# Patient Record
Sex: MALE | Race: OTHER
[De-identification: names, ages, dates, MRNs, and addresses within clinical notes are randomized per-mention and may not be internally consistent; named-entity substitution may affect disease eponyms.]

---

## 2017-12-27 ENCOUNTER — APPOINTMENT (OUTPATIENT)
Dept: NEUROSURGERY | Facility: CLINIC | Age: 67
End: 2017-12-27
Payer: MEDICARE

## 2017-12-27 PROBLEM — Z00.00 ENCOUNTER FOR PREVENTIVE HEALTH EXAMINATION: Status: ACTIVE | Noted: 2017-12-27

## 2017-12-27 PROCEDURE — 99203 OFFICE O/P NEW LOW 30 MIN: CPT

## 2018-11-25 ENCOUNTER — RX RENEWAL (OUTPATIENT)
Age: 68
End: 2018-11-25

## 2019-07-30 ENCOUNTER — OUTPATIENT (OUTPATIENT)
Dept: OUTPATIENT SERVICES | Facility: HOSPITAL | Age: 69
LOS: 1 days | Discharge: HOME | End: 2019-07-30
Payer: MEDICARE

## 2019-07-30 DIAGNOSIS — R10.2 PELVIC AND PERINEAL PAIN: ICD-10-CM

## 2019-07-30 DIAGNOSIS — M54.5 LOW BACK PAIN: ICD-10-CM

## 2019-07-30 DIAGNOSIS — M25.552 PAIN IN LEFT HIP: ICD-10-CM

## 2019-07-30 PROCEDURE — 73502 X-RAY EXAM HIP UNI 2-3 VIEWS: CPT | Mod: 26,LT

## 2019-07-30 PROCEDURE — 72110 X-RAY EXAM L-2 SPINE 4/>VWS: CPT | Mod: 26

## 2022-06-25 ENCOUNTER — INPATIENT (INPATIENT)
Facility: HOSPITAL | Age: 72
LOS: 5 days | Discharge: HOME | End: 2022-07-01
Attending: HOSPITALIST | Admitting: HOSPITALIST
Payer: MEDICARE

## 2022-06-25 VITALS
DIASTOLIC BLOOD PRESSURE: 65 MMHG | SYSTOLIC BLOOD PRESSURE: 146 MMHG | OXYGEN SATURATION: 100 % | RESPIRATION RATE: 18 BRPM | TEMPERATURE: 98 F | WEIGHT: 199.96 LBS | HEART RATE: 86 BPM

## 2022-06-25 DIAGNOSIS — E87.2 ACIDOSIS: ICD-10-CM

## 2022-06-25 PROCEDURE — 99285 EMERGENCY DEPT VISIT HI MDM: CPT

## 2022-06-25 PROCEDURE — 71045 X-RAY EXAM CHEST 1 VIEW: CPT | Mod: 26

## 2022-06-25 NOTE — ED ADULT TRIAGE NOTE - CHIEF COMPLAINT QUOTE
Pt c/o of cough x 1 month and abdominal pain with diarrhea x 2 weeks. Pt also has bilateral lower extremity swelling. denies fevers

## 2022-06-26 LAB
ALBUMIN SERPL ELPH-MCNC: 2.2 G/DL — LOW (ref 3.5–5.2)
ALBUMIN SERPL ELPH-MCNC: 2.4 G/DL — LOW (ref 3.5–5.2)
ALP SERPL-CCNC: 75 U/L — SIGNIFICANT CHANGE UP (ref 30–115)
ALP SERPL-CCNC: 84 U/L — SIGNIFICANT CHANGE UP (ref 30–115)
ALT FLD-CCNC: 12 U/L — SIGNIFICANT CHANGE UP (ref 0–41)
ALT FLD-CCNC: 14 U/L — SIGNIFICANT CHANGE UP (ref 0–41)
ANION GAP SERPL CALC-SCNC: 12 MMOL/L — SIGNIFICANT CHANGE UP (ref 7–14)
ANION GAP SERPL CALC-SCNC: 7 MMOL/L — SIGNIFICANT CHANGE UP (ref 7–14)
APPEARANCE UR: CLEAR — SIGNIFICANT CHANGE UP
AST SERPL-CCNC: 41 U/L — SIGNIFICANT CHANGE UP (ref 0–41)
AST SERPL-CCNC: 51 U/L — HIGH (ref 0–41)
BASE EXCESS BLDV CALC-SCNC: -3.7 MMOL/L — LOW (ref -2–3)
BASOPHILS # BLD AUTO: 0.05 K/UL — SIGNIFICANT CHANGE UP (ref 0–0.2)
BASOPHILS # BLD AUTO: 0.05 K/UL — SIGNIFICANT CHANGE UP (ref 0–0.2)
BASOPHILS NFR BLD AUTO: 0.8 % — SIGNIFICANT CHANGE UP (ref 0–1)
BASOPHILS NFR BLD AUTO: 0.9 % — SIGNIFICANT CHANGE UP (ref 0–1)
BILIRUB SERPL-MCNC: <0.2 MG/DL — SIGNIFICANT CHANGE UP (ref 0.2–1.2)
BILIRUB SERPL-MCNC: <0.2 MG/DL — SIGNIFICANT CHANGE UP (ref 0.2–1.2)
BILIRUB UR-MCNC: NEGATIVE — SIGNIFICANT CHANGE UP
BUN SERPL-MCNC: 19 MG/DL — SIGNIFICANT CHANGE UP (ref 10–20)
BUN SERPL-MCNC: 22 MG/DL — HIGH (ref 10–20)
CA-I SERPL-SCNC: 1.2 MMOL/L — SIGNIFICANT CHANGE UP (ref 1.15–1.33)
CALCIUM SERPL-MCNC: 7.9 MG/DL — LOW (ref 8.5–10.1)
CALCIUM SERPL-MCNC: 8.1 MG/DL — LOW (ref 8.5–10.1)
CHLORIDE SERPL-SCNC: 102 MMOL/L — SIGNIFICANT CHANGE UP (ref 98–110)
CHLORIDE SERPL-SCNC: 103 MMOL/L — SIGNIFICANT CHANGE UP (ref 98–110)
CO2 SERPL-SCNC: 19 MMOL/L — SIGNIFICANT CHANGE UP (ref 17–32)
CO2 SERPL-SCNC: 25 MMOL/L — SIGNIFICANT CHANGE UP (ref 17–32)
COLOR SPEC: SIGNIFICANT CHANGE UP
CREAT SERPL-MCNC: 1 MG/DL — SIGNIFICANT CHANGE UP (ref 0.7–1.5)
CREAT SERPL-MCNC: 1.1 MG/DL — SIGNIFICANT CHANGE UP (ref 0.7–1.5)
DIFF PNL FLD: NEGATIVE — SIGNIFICANT CHANGE UP
EGFR: 71 ML/MIN/1.73M2 — SIGNIFICANT CHANGE UP
EGFR: 80 ML/MIN/1.73M2 — SIGNIFICANT CHANGE UP
EOSINOPHIL # BLD AUTO: 0.07 K/UL — SIGNIFICANT CHANGE UP (ref 0–0.7)
EOSINOPHIL # BLD AUTO: 0.09 K/UL — SIGNIFICANT CHANGE UP (ref 0–0.7)
EOSINOPHIL NFR BLD AUTO: 1.2 % — SIGNIFICANT CHANGE UP (ref 0–8)
EOSINOPHIL NFR BLD AUTO: 1.4 % — SIGNIFICANT CHANGE UP (ref 0–8)
GAS PNL BLDV: 131 MMOL/L — LOW (ref 136–145)
GAS PNL BLDV: SIGNIFICANT CHANGE UP
GAS PNL BLDV: SIGNIFICANT CHANGE UP
GLUCOSE BLDC GLUCOMTR-MCNC: 113 MG/DL — HIGH (ref 70–99)
GLUCOSE BLDC GLUCOMTR-MCNC: 119 MG/DL — HIGH (ref 70–99)
GLUCOSE BLDC GLUCOMTR-MCNC: 92 MG/DL — SIGNIFICANT CHANGE UP (ref 70–99)
GLUCOSE SERPL-MCNC: 119 MG/DL — HIGH (ref 70–99)
GLUCOSE SERPL-MCNC: 170 MG/DL — HIGH (ref 70–99)
GLUCOSE UR QL: ABNORMAL
HCO3 BLDV-SCNC: 23 MMOL/L — SIGNIFICANT CHANGE UP (ref 22–29)
HCT VFR BLD CALC: 32.9 % — LOW (ref 42–52)
HCT VFR BLD CALC: 36.5 % — LOW (ref 42–52)
HCT VFR BLDA CALC: 37 % — LOW (ref 39–51)
HGB BLD CALC-MCNC: 12.2 G/DL — LOW (ref 12.6–17.4)
HGB BLD-MCNC: 10.9 G/DL — LOW (ref 14–18)
HGB BLD-MCNC: 11.9 G/DL — LOW (ref 14–18)
IMM GRANULOCYTES NFR BLD AUTO: 1.1 % — HIGH (ref 0.1–0.3)
IMM GRANULOCYTES NFR BLD AUTO: 1.2 % — HIGH (ref 0.1–0.3)
KETONES UR-MCNC: NEGATIVE — SIGNIFICANT CHANGE UP
LACTATE BLDV-MCNC: 2.1 MMOL/L — HIGH (ref 0.5–2)
LACTATE SERPL-SCNC: 1.4 MMOL/L — SIGNIFICANT CHANGE UP (ref 0.7–2)
LACTATE SERPL-SCNC: 2.2 MMOL/L — HIGH (ref 0.7–2)
LEUKOCYTE ESTERASE UR-ACNC: NEGATIVE — SIGNIFICANT CHANGE UP
LYMPHOCYTES # BLD AUTO: 2.27 K/UL — SIGNIFICANT CHANGE UP (ref 1.2–3.4)
LYMPHOCYTES # BLD AUTO: 2.97 K/UL — SIGNIFICANT CHANGE UP (ref 1.2–3.4)
LYMPHOCYTES # BLD AUTO: 39.3 % — SIGNIFICANT CHANGE UP (ref 20.5–51.1)
LYMPHOCYTES # BLD AUTO: 44.6 % — SIGNIFICANT CHANGE UP (ref 20.5–51.1)
MAGNESIUM SERPL-MCNC: 1.5 MG/DL — LOW (ref 1.8–2.4)
MCHC RBC-ENTMCNC: 26 PG — LOW (ref 27–31)
MCHC RBC-ENTMCNC: 26.5 PG — LOW (ref 27–31)
MCHC RBC-ENTMCNC: 32.6 G/DL — SIGNIFICANT CHANGE UP (ref 32–37)
MCHC RBC-ENTMCNC: 33.1 G/DL — SIGNIFICANT CHANGE UP (ref 32–37)
MCV RBC AUTO: 79.7 FL — LOW (ref 80–94)
MCV RBC AUTO: 79.9 FL — LOW (ref 80–94)
MONOCYTES # BLD AUTO: 0.54 K/UL — SIGNIFICANT CHANGE UP (ref 0.1–0.6)
MONOCYTES # BLD AUTO: 0.64 K/UL — HIGH (ref 0.1–0.6)
MONOCYTES NFR BLD AUTO: 9.4 % — HIGH (ref 1.7–9.3)
MONOCYTES NFR BLD AUTO: 9.6 % — HIGH (ref 1.7–9.3)
NEUTROPHILS # BLD AUTO: 2.77 K/UL — SIGNIFICANT CHANGE UP (ref 1.4–6.5)
NEUTROPHILS # BLD AUTO: 2.84 K/UL — SIGNIFICANT CHANGE UP (ref 1.4–6.5)
NEUTROPHILS NFR BLD AUTO: 42.5 % — SIGNIFICANT CHANGE UP (ref 42.2–75.2)
NEUTROPHILS NFR BLD AUTO: 48 % — SIGNIFICANT CHANGE UP (ref 42.2–75.2)
NITRITE UR-MCNC: NEGATIVE — SIGNIFICANT CHANGE UP
NRBC # BLD: 0 /100 WBCS — SIGNIFICANT CHANGE UP (ref 0–0)
NRBC # BLD: 0 /100 WBCS — SIGNIFICANT CHANGE UP (ref 0–0)
NT-PROBNP SERPL-SCNC: 271 PG/ML — SIGNIFICANT CHANGE UP (ref 0–300)
PCO2 BLDV: 48 MMHG — SIGNIFICANT CHANGE UP (ref 42–55)
PH BLDV: 7.29 — LOW (ref 7.32–7.43)
PH UR: 5.5 — SIGNIFICANT CHANGE UP (ref 5–8)
PLATELET # BLD AUTO: 334 K/UL — SIGNIFICANT CHANGE UP (ref 130–400)
PLATELET # BLD AUTO: 349 K/UL — SIGNIFICANT CHANGE UP (ref 130–400)
PO2 BLDV: 38 MMHG — SIGNIFICANT CHANGE UP
POTASSIUM BLDV-SCNC: 3.9 MMOL/L — SIGNIFICANT CHANGE UP (ref 3.5–5.1)
POTASSIUM SERPL-MCNC: 3.8 MMOL/L — SIGNIFICANT CHANGE UP (ref 3.5–5)
POTASSIUM SERPL-MCNC: 4.3 MMOL/L — SIGNIFICANT CHANGE UP (ref 3.5–5)
POTASSIUM SERPL-SCNC: 3.8 MMOL/L — SIGNIFICANT CHANGE UP (ref 3.5–5)
POTASSIUM SERPL-SCNC: 4.3 MMOL/L — SIGNIFICANT CHANGE UP (ref 3.5–5)
PROT SERPL-MCNC: 4.3 G/DL — LOW (ref 6–8)
PROT SERPL-MCNC: 4.7 G/DL — LOW (ref 6–8)
PROT UR-MCNC: NEGATIVE — SIGNIFICANT CHANGE UP
RAPID RVP RESULT: DETECTED
RBC # BLD: 4.12 M/UL — LOW (ref 4.7–6.1)
RBC # BLD: 4.58 M/UL — LOW (ref 4.7–6.1)
RBC # FLD: 16.4 % — HIGH (ref 11.5–14.5)
RBC # FLD: 16.5 % — HIGH (ref 11.5–14.5)
SAO2 % BLDV: 55.2 % — SIGNIFICANT CHANGE UP
SARS-COV-2 RNA SPEC QL NAA+PROBE: DETECTED
SODIUM SERPL-SCNC: 134 MMOL/L — LOW (ref 135–146)
SODIUM SERPL-SCNC: 134 MMOL/L — LOW (ref 135–146)
SP GR SPEC: 1.01 — SIGNIFICANT CHANGE UP (ref 1.01–1.03)
TROPONIN T SERPL-MCNC: 0.04 NG/ML — CRITICAL HIGH
TROPONIN T SERPL-MCNC: 0.04 NG/ML — CRITICAL HIGH
UROBILINOGEN FLD QL: SIGNIFICANT CHANGE UP
WBC # BLD: 5.77 K/UL — SIGNIFICANT CHANGE UP (ref 4.8–10.8)
WBC # BLD: 6.66 K/UL — SIGNIFICANT CHANGE UP (ref 4.8–10.8)
WBC # FLD AUTO: 5.77 K/UL — SIGNIFICANT CHANGE UP (ref 4.8–10.8)
WBC # FLD AUTO: 6.66 K/UL — SIGNIFICANT CHANGE UP (ref 4.8–10.8)

## 2022-06-26 PROCEDURE — 71275 CT ANGIOGRAPHY CHEST: CPT | Mod: 26,MA

## 2022-06-26 PROCEDURE — 93010 ELECTROCARDIOGRAM REPORT: CPT

## 2022-06-26 PROCEDURE — 99223 1ST HOSP IP/OBS HIGH 75: CPT

## 2022-06-26 PROCEDURE — 74177 CT ABD & PELVIS W/CONTRAST: CPT | Mod: 26,MA

## 2022-06-26 PROCEDURE — 93970 EXTREMITY STUDY: CPT | Mod: 26

## 2022-06-26 RX ORDER — PANTOPRAZOLE SODIUM 20 MG/1
40 TABLET, DELAYED RELEASE ORAL
Refills: 0 | Status: DISCONTINUED | OUTPATIENT
Start: 2022-06-26 | End: 2022-07-01

## 2022-06-26 RX ORDER — CEFTRIAXONE 500 MG/1
1000 INJECTION, POWDER, FOR SOLUTION INTRAMUSCULAR; INTRAVENOUS EVERY 24 HOURS
Refills: 0 | Status: DISCONTINUED | OUTPATIENT
Start: 2022-06-26 | End: 2022-06-30

## 2022-06-26 RX ORDER — GUAIFENESIN/DEXTROMETHORPHAN 600MG-30MG
10 TABLET, EXTENDED RELEASE 12 HR ORAL EVERY 6 HOURS
Refills: 0 | Status: DISCONTINUED | OUTPATIENT
Start: 2022-06-26 | End: 2022-07-01

## 2022-06-26 RX ORDER — OXYCODONE AND ACETAMINOPHEN 5; 325 MG/1; MG/1
1 TABLET ORAL ONCE
Refills: 0 | Status: DISCONTINUED | OUTPATIENT
Start: 2022-06-26 | End: 2022-06-26

## 2022-06-26 RX ORDER — INSULIN LISPRO 100/ML
VIAL (ML) SUBCUTANEOUS
Refills: 0 | Status: DISCONTINUED | OUTPATIENT
Start: 2022-06-26 | End: 2022-07-01

## 2022-06-26 RX ORDER — SODIUM CHLORIDE 9 MG/ML
500 INJECTION, SOLUTION INTRAVENOUS
Refills: 0 | Status: DISCONTINUED | OUTPATIENT
Start: 2022-06-26 | End: 2022-06-27

## 2022-06-26 RX ORDER — METRONIDAZOLE 500 MG
500 TABLET ORAL EVERY 8 HOURS
Refills: 0 | Status: DISCONTINUED | OUTPATIENT
Start: 2022-06-26 | End: 2022-06-30

## 2022-06-26 RX ORDER — INSULIN GLARGINE 100 [IU]/ML
8 INJECTION, SOLUTION SUBCUTANEOUS AT BEDTIME
Refills: 0 | Status: DISCONTINUED | OUTPATIENT
Start: 2022-06-26 | End: 2022-07-01

## 2022-06-26 RX ORDER — MORPHINE SULFATE 50 MG/1
2 CAPSULE, EXTENDED RELEASE ORAL EVERY 6 HOURS
Refills: 0 | Status: DISCONTINUED | OUTPATIENT
Start: 2022-06-26 | End: 2022-07-01

## 2022-06-26 RX ORDER — IPRATROPIUM/ALBUTEROL SULFATE 18-103MCG
3 AEROSOL WITH ADAPTER (GRAM) INHALATION EVERY 6 HOURS
Refills: 0 | Status: DISCONTINUED | OUTPATIENT
Start: 2022-06-26 | End: 2022-07-01

## 2022-06-26 RX ORDER — OXYCODONE AND ACETAMINOPHEN 5; 325 MG/1; MG/1
1 TABLET ORAL EVERY 6 HOURS
Refills: 0 | Status: DISCONTINUED | OUTPATIENT
Start: 2022-06-26 | End: 2022-07-01

## 2022-06-26 RX ADMIN — Medication 10 MILLILITER(S): at 10:03

## 2022-06-26 RX ADMIN — Medication 500 MILLIGRAM(S): at 23:16

## 2022-06-26 RX ADMIN — PANTOPRAZOLE SODIUM 40 MILLIGRAM(S): 20 TABLET, DELAYED RELEASE ORAL at 14:18

## 2022-06-26 RX ADMIN — CEFTRIAXONE 100 MILLIGRAM(S): 500 INJECTION, POWDER, FOR SOLUTION INTRAMUSCULAR; INTRAVENOUS at 12:57

## 2022-06-26 RX ADMIN — Medication 10 MILLILITER(S): at 18:07

## 2022-06-26 RX ADMIN — Medication 100 MILLIGRAM(S): at 18:06

## 2022-06-26 RX ADMIN — Medication 10 MILLILITER(S): at 23:17

## 2022-06-26 RX ADMIN — SODIUM CHLORIDE 70 MILLILITER(S): 9 INJECTION, SOLUTION INTRAVENOUS at 14:21

## 2022-06-26 RX ADMIN — INSULIN GLARGINE 8 UNIT(S): 100 INJECTION, SOLUTION SUBCUTANEOUS at 22:16

## 2022-06-26 RX ADMIN — Medication 500 MILLIGRAM(S): at 14:18

## 2022-06-26 RX ADMIN — Medication 100 MILLIGRAM(S): at 10:03

## 2022-06-26 NOTE — ED PROVIDER NOTE - CARE PLAN
1 Principal Discharge DX:	Acute COVID-19  Secondary Diagnosis:	Elevated troponin  Secondary Diagnosis:	Mesenteric lymphadenitis

## 2022-06-26 NOTE — ED PROVIDER NOTE - ATTENDING CONTRIBUTION TO CARE
71 y.o. male, PMH of HLD and DM, recently returned from 5 mo trip to [facility], comes in with multiple complains.  Pt reports non-bloody diarrhea, generalized abdominal discomfort which started 2 wks ago and is getting worse. No n/v. (+) decreased appetite. (+) wt loss.  Pt is also c/o 2 mo h/o cough which is getting worse. No CP/SOB. No fever/chills.  Pt is also c/o b/l LE edema, L>R, which started 2-3 days ago after the flight.     Denies fevers, chills, nausea, vomiting, urinary symptoms, chest pain, palpitations, shortness of breath, dyspnea on exertion, syncope/near syncope, headache, numbness, focal deficits, visual changes, gait or balance changes, dizziness.    On exam, pt in NAD, AAOx3, head NC/AT, CN II-XII intact, lungs rhonchi B/L, CV S1S2 regular, abdomen soft/generalized discomfort/ND/(+)BS, ext (+) pitting edema, motor 5/5x4, sensation intact.    Labs/XR/CT done and reviewed. Pt with iliatis- TB/fungal dz can't be excluded. Pt is also (+) for covid pneumonia. Abx given. WIll admit pt for further work up.

## 2022-06-26 NOTE — ED PROVIDER NOTE - CLINICAL SUMMARY MEDICAL DECISION MAKING FREE TEXT BOX
71 y.o. male, PMH of HLD and DM, recently returned from 5 mo trip to [facility], comes in with multiple complains.  Pt reports non-bloody diarrhea, generalized abdominal discomfort which started 2 wks ago and is getting worse. No n/v. (+) decreased appetite. (+) wt loss.  Pt is also c/o 2 mo h/o cough which is getting worse. No CP/SOB. No fever/chills.  Pt is also c/o b/l LE edema, L>R, which started 2-3 days ago after the flight.     Denies fevers, chills, nausea, vomiting, urinary symptoms, chest pain, palpitations, shortness of breath, dyspnea on exertion, syncope/near syncope, headache, numbness, focal deficits, visual changes, gait or balance changes, dizziness.    On exam, pt in NAD, AAOx3, head NC/AT, CN II-XII intact, lungs rhonchi B/L, CV S1S2 regular, abdomen soft/generalized discomfort/ND/(+)BS, ext (+) pitting edema, motor 5/5x4, sensation intact.    Labs/XR/CT done and reviewed. Pt with iliatis- TB/fungal dz can't be excluded. Pt is also (+) for covid pneumonia. Abx given. WIll admit pt for further work up.

## 2022-06-26 NOTE — H&P ADULT - ASSESSMENT
71-year-old male with a history of HLD and DM presenting for cough for the last 2 months.    #Cough     #LE edema     #HDL     #DM       DVT ppx: Lovenox  GI ppx: pantoprazole   Diet: DASH/TLC carb consistent   Ambulate: as tolerated   Med/Rec: IMCOMPLETE. Patient and wife do not have a list of medications Attempted to call pharmacy but was closed !!!!!!!!!!!  Disposition: Acute from home  71-year-old male with a history of HLD and DM presenting for cough for the last 2 months.    #Cough   #Bibasilar granular GG opacities   #Paratracheal lymph node measuring 1.2 cm  Working DDx: TB   - CT chest: No PE, Bibasilar granular GG opacities   - Non smoker, no sick contact   - SPO 99%    - Follow up IGRA, sputum culture and blood cultutres       #LE edema     #Troponemia     #HDL     #DM       DVT ppx: Lovenox  GI ppx: pantoprazole   Diet: DASH/TLC carb consistent   Ambulate: as tolerated   Med/Rec: IMCOMPLETE. Patient and wife do not have a list of medications Attempted to call pharmacy but was closed !!!!!!!!!!!  Disposition: Acute from home  71-year-old male with a history of HLD and DM presenting for cough for the last 2 months.    #Cough   #Bibasilar granular GG opacities   #Paratracheal lymph node measuring 1.2 cm  Working DDx: TB   - CT chest: No PE, Bibasilar granular GG opacities   - Non smoker, no sick contact. No pets at home   - Recent travel to [place]   - SPO 99% on RA   - No evidence of pneumonia  - Follow up on IGRA, sputum cultures, AFB   - PRN Robitussin for cough     #Ileitis  #Extensive mesenteric and retroperitoneal JOSE  Working DDX: possible TB advance s infectious ilitis   - Pending stool sample and stool culture  - Start Ceftriaxone and flagyl for Ileitis    - Consult infectious diseases     #COVID   - Incidental finding covid positive PCR  - CT chest: GG opacities on the lower lobes   - No O2 requirements   - Will follow inflammatory markers (ferritin, CRP, ESR)   - Continue flagyl and ceftriaxone     #LE edema   - No Hx of HF as per patient  - Duple LE: neg   - Recent 16hr flight   - Elevate legs >>> monitor   - Follow up BNP and TTE     #Lactic Acid   - Lactic acid 2.2 >>> follow up repeat   - start fluids 70cc/hr   - Patient clinically hypovolemic (dry mucous membranes despite LE present_)     #Elevated troponins   - Troponin 0.02 >> repeat at 11 am and 8 pm   - No chest pain   - EKG unremarkable     #HDL   - Follow up lipid profile   - unknown home medication >> call pharmacy     #DM   - not on home insulin         DVT ppx: Lovenox  GI ppx: pantoprazole   Diet: DASH/TLC carb consistent   Ambulate: as tolerated   Med/Rec: IMCOMPLETE. Patient and wife do not have a list of medications Attempted to call pharmacy but was closed !!!!!!!!!!!  Disposition: Acute from home  71-year-old male with a history of HLD and DM presenting for cough for the last 2 months.    #Cough   #Bibasilar granular GG opacities   #Paratracheal lymph node measuring 1.2 cm  Working DDx: TB   - CT chest: No PE, Bibasilar granular GG opacities   - Non smoker, no sick contact. No pets at home   - Recent travel to [place]   - SPO 99% on RA   - No evidence of pneumonia  - Follow up on IGRA, sputum cultures, AFB   - PRN Robitussin for cough     #Ileitis  #Extensive mesenteric and retroperitoneal JOSE  Working DDX: possible TB advance s infectious ilitis   - Pending stool sample and stool culture  - Start Ceftriaxone and flagyl for Ileitis    - Consult infectious diseases     #COVID   - Incidental finding covid positive PCR  - CT chest: GG opacities on the lower lobes   - No O2 requirements   - Will follow inflammatory markers (ferritin, CRP, ESR)   - Continue flagyl and ceftriaxone     #LE edema   - No Hx of HF as per patient  - Duple LE: neg   - Recent 16hr flight   - Elevate legs >>> monitor   - Follow up BNP and TTE     #Lactic Acid   - Lactic acid 2.2 >>> follow up repeat   - start fluids 70cc/hr   - Patient clinically hypovolemic (dry mucous membranes despite LE present_)     #Elevated troponins   - Troponin 0.02 >> repeat at 11 am and 8 pm   - No chest pain   - EKG unremarkable     #HDL   - Follow up lipid profile   - unknown home medication >> call pharmacy     #DM   - not on home insulin   - Will start lantus 8 units   - Correctional scale    DVT ppx: Lovenox  GI ppx: pantoprazole   Diet: DASH/TLC carb consistent   Ambulate: as tolerated   Med/Rec: IMCOMPLETE. Patient and wife do not have a list of medications Attempted to call pharmacy but was closed !!!!!!!!!!!  Disposition: Acute from home

## 2022-06-26 NOTE — ED ADULT NURSE NOTE - OBJECTIVE STATEMENT
Patient presents to ED in NAD a+ox3 co cough X 1 month and diarrhea X 2 weeks.  Pt denies fever, n/v.

## 2022-06-26 NOTE — H&P ADULT - NSHPREVIEWOFSYSTEMS_GEN_ALL_CORE
General: Denies fatigue, fever, chills, weight loss; + weight gain as above  HENT: Denies oral sores, neck masses, nasal d/c, hearing problems  Vison: Denies change in vision, eye pain, redness, discharge  Cardiac: As above  Pulmonary: As above  GI: Denies heart burn, swallowing difficulty, abdominal pain, diarrhea, constipation  : As per HPI  Neuro: Denies seizure, weakness, numbness  Endo: Denies heat/cold intolerance, weight changes, polyuria, polydipsia  Heme/Onc: Denies unusual bleeding, bruising, clotting  MSK: Denies join pain, swelling, muscle aches  Mental Health: Denies anxiety, depression, mood changes,  Skin/Hair: Denies rashes, non-healing wounds, ulcers, hair loss General: Fatigue and weight loss as noted above   HENT: Denies oral sores, neck masses, nasal d/c, hearing problems  Vison: Denies change in vision, eye pain, redness, discharge  Cardiac: no orthopnea, no chest pain   Pulmonary: As above  GI: epigastric pain; denies any swallowing difficulty, abdominal pain, diarrhea, constipation  : no dysuria, no urinary difficulty   Neuro: Denies seizure, weakness, numbness  Endo: Denies heat/cold intolerance, weight changes, polyuria, polydipsia  Heme/Onc: Denies unusual bleeding, bruising, clotting  MSK: Denies join pain, swelling, muscle aches  Mental Health: Denies anxiety, depression, mood changes,  Skin/Hair: Denies rashes, non-healing wounds, ulcers, hair loss

## 2022-06-26 NOTE — ED PROVIDER NOTE - ADMIT DISPOSITION PRESENT ON ADMISSION SEPSIS
Asthma    DM (diabetes mellitus), type 2    HTN (hypertension), benign    RA (rheumatoid arthritis)
No

## 2022-06-26 NOTE — ED ADULT NURSE NOTE - INTERVENTIONS DEFINITIONS
[place] to call system/Call bell, personal items and telephone within reach/Instruct patient to call for assistance/Room bathroom lighting operational/Non-slip footwear when patient is off stretcher/Physically safe environment: no spills, clutter or unnecessary equipment/Stretcher in lowest position, wheels locked, appropriate side rails in place/Monitor gait and stability/Monitor for mental status changes and reorient to person, place, and time/Review medications for side effects contributing to fall risk/Reinforce activity limits and safety measures with patient and family/Provide visual clues: red socks

## 2022-06-26 NOTE — PATIENT PROFILE ADULT - FALL HARM RISK - HARM RISK INTERVENTIONS

## 2022-06-26 NOTE — H&P ADULT - NSHPLABSRESULTS_GEN_ALL_CORE
11.9   6.66  )-----------( 349      ( 26 Jun 2022 01:00 )             36.5   06-26    134<L>  |  103  |  22<H>  ----------------------------<  119<H>  4.3   |  19  |  1.1    Ca    8.1<L>      26 Jun 2022 01:00    TPro  4.7<L>  /  Alb  2.4<L>  /  TBili  <0.2  /  DBili  x   /  AST  51<H>  /  ALT  14  /  AlkPhos  84  06-26    Lactate, Blood: 2.2: Elevated lactate. Consider ordering follow-up lactate to trend. mmol/L (06.26.22 @ 01:00)      < from: [facility] Duplex Lower Ext Vein Scan, Bilat (06.26.22 @ 09:50) >    IMPRESSION:  No evidence of deep venous thrombosis in either lower extremity.  Difficult study, patient with Covid. Constant coughing.    --- End of Report ---    < end of copied text >    CT abdomen and pelvis  Long segment ileitis with extensive mesenteric and retroperitoneal   lymphadenopathy (some of which are necrotic) with stranding of mesentery.   Mesenteric lymphadenitis secondary to enteritis, lymphadenitis from   inflammatory etiologies, and lymphoma are considerations, and further   evaluation recommended.    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    --- End of Report ---    < from: CT Angio Chest PE Protocol w/ IV Cont (06.26.22 @ 02:44) >      IMPRESSION:    No evidence of acute pulmonary embolus.    Multiple bilateral pulmonary nodules measuring up to 3 mm in the left   upper lobe. Per Fleischner Society 2017 guidelines, consider optional CT   chest in 12 months in a high risk patient (emphysema).    Bibasilar nodular patchy groundglass opacities, may be   infectious/inflammatory in etiology.    Enlarged left paratracheal lymph node measuring 1.2 cm, likely reactive.      < end of copied text >

## 2022-06-26 NOTE — H&P ADULT - HISTORY OF PRESENT ILLNESS
71-year-old male with a history of hyperlipidemia and diabetes.  Presenting for multiple complaints.  Patient recently traveled to [facility] for 5-month, returning 3 days ago.  This was a 16-hour flight.  He has been having a cough for 1 month which is what and associated with pleuritic chest pain.  He then developed vague diffuse abdominal pain associated with nonbloody diarrhea for 2 weeks.  Over the past 2 to 3 days he has been having bilateral symmetric lower extremity pitting edema, after the flight. No fever/chills, sore throat, palpitations, NV, dysuria, hematuria, new joint pain, FND, rash, trauma. 71-year-old male with a history of HLD and DM presenting for cough for the last 2 months. Patient describes a nonproductive cough that started 2 months ago and that has gradually worsened over time; there is no associated chest pain or SOB. Associated symptoms incloude weight loss (unknow    resenting for multiple complaints.  Patient recently traveled to [facility] for 5-month, returning 3 days ago.  This was a 16-hour flight.  He has been having a cough for 1 month which is what and associated with pleuritic chest pain.  He then developed vague diffuse abdominal pain associated with nonbloody diarrhea for 2 weeks.  Over the past 2 to 3 days he has been having bilateral symmetric lower extremity pitting edema, after the flight. No fever/chills, sore throat, palpitations, NV, dysuria, hematuria, new joint pain, FND, rash, trauma. 71-year-old male with a history of HLD and DM presenting for cough for the last 2 months. Patient describes a nonproductive cough that started 2 months ago and that has gradually worsened over time; there is no associated chest pain or SOB. Associated symptoms started 2 months ago include weight loss (unknown how much), decreased appetite, fatigue, abdominal pain and diarrhea. Patient endorses feeling sick for a while and not eating appropriately Diarrhea episodes were postprandial associated with epigastric pain; alternating diarrhea with lose stools have were dark red in color and now greenish (as per patient) with no mucus.  Patient recently traveled to [facility] for 4 months total, returning 3 days ago to [place].   Over the past 2 to 3 days he has been having bilateral symmetric lower extremity pitting edema worse on the left leg than on the right, after the flight. No fever/chills, sore throat, palpitations, NV, dysuria, hematuria, new joint pain, FND, rash, trauma; denies any sick contact or exposure to animals (no cattle or home pets neither here or in [facility]     ED vitals stable  >>> CT chest abdomen pelvis done in ED     Vital Signs Last 24 Hrs  T(C): 36.4 (26 Jun 2022 07:51), Max: 36.8 (25 Jun 2022 22:09)  T(F): 97.6 (26 Jun 2022 07:51), Max: 98.3 (25 Jun 2022 22:09)  HR: 84 (26 Jun 2022 07:51) (84 - 87)  BP: 140/64 (26 Jun 2022 07:51) (140/64 - 146/65)  BP(mean): --  RR: 18 (26 Jun 2022 07:51) (18 - 18)  SpO2: 99% (26 Jun 2022 07:51) (98% - 100%)

## 2022-06-26 NOTE — ED PROVIDER NOTE - PROGRESS NOTE DETAILS
Resident AO: CXR suspicious for ?L-sided pneumothorax, though there are lung markings beyond the line.

## 2022-06-26 NOTE — ED PROVIDER NOTE - PHYSICAL EXAMINATION
_  Vital signs reviewed; ABCs intact  GENERAL: Well nourished, comfortable  SKIN: Warm, dry  HEAD & NECK: NCAT, supple neck  EYES: EOMI, PER B/L, no scleral icterus, no conjunctival injection, no conjunctival pallor  ENT: MMM  CARD: RRR, S1, S2; no murmurs, no rubs, no gallops  RESP: Normal respiratory effort, +rhonchi bilaterally  ABD: Soft, ND, +tender throughout; no rebound, no guarding, +BS; no CVAT  EXT: +B/L pitting edema; pulses palpable distally; no calf tenderness; symmetrical calf circumferences  NEUROMSK: Grossly intact  PSYCH: AAOx3, cooperative, appropriate

## 2022-06-26 NOTE — ED PROVIDER NOTE - OBJECTIVE STATEMENT
Patient is a 71-year-old male with a history of hyperlipidemia and diabetes.  Presenting for multiple complaints.  Patient recently traveled to [facility] for 5-month, returning 3 days ago.  This was a 16-hour flight.  He has been having a cough for 1 month which is what and associated with pleuritic chest pain.  He then developed vague diffuse abdominal pain associated with nonbloody diarrhea for 2 weeks.  Over the past 2 to 3 days he has been having bilateral symmetric lower extremity pitting edema, after the flight. No fever/chills, sore throat, palpitations, NV, dysuria, hematuria, new joint pain, FND, rash, trauma.

## 2022-06-26 NOTE — H&P ADULT - NSHPPHYSICALEXAM_GEN_ALL_CORE
T(C): 36.4 (06-26-22 @ 07:51), Max: 36.8 (06-25-22 @ 22:09)  HR: 84 (06-26-22 @ 07:51) (84 - 87)  BP: 140/64 (06-26-22 @ 07:51) (140/64 - 146/65)  RR: 18 (06-26-22 @ 07:51) (18 - 18)  SpO2: 99% (06-26-22 @ 07:51) (98% - 100%)    CONSTITUTIONAL: Well groomed, patient looks fatigued and is coughing   EYES: PERRLA and symmetric, EOMI, No conjunctival or scleral injection, non-icteric  ENMT: Oral mucosa with dry membranes. No external nasal lesions; nasal mucosa not inflamed; normal dentition; no pharyngeal injection or exudates  NECK: Supple, symmetric and without tracheal deviation  RESPIRATORY: No respiratory distress, no use of accessory muscles; Inspiratory wheezes with ronchi  CARDIOVASCULAR: RRRR, +S1S2, no murmurs, no rubs, no gallops; no JVD, 2+ pitting edema of lower extremitites   GASTROINTESTINAL: Soft, non tender, non distended, no rebound, no guarding  SKIN: No rashes or ulcers noted; no subcutaneous nodules or induration palpable  NEUROLOGIC: CN II-XII intact; moves all extremities  PSYCHIATRIC: Appropriate insight/judgment; A+O x 3, mood and affect appropriate

## 2022-06-26 NOTE — ED ADULT NURSE REASSESSMENT NOTE - NS ED NURSE REASSESS COMMENT FT1
Patient transferred to ED 3 room 16 in [facility]. Report endorsed to receiving RN

## 2022-06-27 LAB
A1C WITH ESTIMATED AVERAGE GLUCOSE RESULT: 5.6 % — SIGNIFICANT CHANGE UP (ref 4–5.6)
ALBUMIN SERPL ELPH-MCNC: 2.4 G/DL — LOW (ref 3.5–5.2)
ALP SERPL-CCNC: 84 U/L — SIGNIFICANT CHANGE UP (ref 30–115)
ALT FLD-CCNC: 12 U/L — SIGNIFICANT CHANGE UP (ref 0–41)
ANION GAP SERPL CALC-SCNC: 12 MMOL/L — SIGNIFICANT CHANGE UP (ref 7–14)
AST SERPL-CCNC: 46 U/L — HIGH (ref 0–41)
BASOPHILS # BLD AUTO: 0.07 K/UL — SIGNIFICANT CHANGE UP (ref 0–0.2)
BASOPHILS NFR BLD AUTO: 1.5 % — HIGH (ref 0–1)
BILIRUB SERPL-MCNC: 0.2 MG/DL — SIGNIFICANT CHANGE UP (ref 0.2–1.2)
BUN SERPL-MCNC: 15 MG/DL — SIGNIFICANT CHANGE UP (ref 10–20)
CALCIUM SERPL-MCNC: 8 MG/DL — LOW (ref 8.5–10.1)
CHLORIDE SERPL-SCNC: 108 MMOL/L — SIGNIFICANT CHANGE UP (ref 98–110)
CHOLEST SERPL-MCNC: 61 MG/DL — SIGNIFICANT CHANGE UP
CO2 SERPL-SCNC: 23 MMOL/L — SIGNIFICANT CHANGE UP (ref 17–32)
CREAT SERPL-MCNC: 0.9 MG/DL — SIGNIFICANT CHANGE UP (ref 0.7–1.5)
CRP SERPL-MCNC: 5.6 MG/L — HIGH
CULTURE RESULTS: SIGNIFICANT CHANGE UP
EGFR: 91 ML/MIN/1.73M2 — SIGNIFICANT CHANGE UP
EOSINOPHIL # BLD AUTO: 0.12 K/UL — SIGNIFICANT CHANGE UP (ref 0–0.7)
EOSINOPHIL NFR BLD AUTO: 2.5 % — SIGNIFICANT CHANGE UP (ref 0–8)
ERYTHROCYTE [SEDIMENTATION RATE] IN BLOOD: 5 MM/HR — SIGNIFICANT CHANGE UP (ref 0–10)
ESTIMATED AVERAGE GLUCOSE: 114 MG/DL — SIGNIFICANT CHANGE UP (ref 68–114)
FERRITIN SERPL-MCNC: 47 NG/ML — SIGNIFICANT CHANGE UP (ref 30–400)
GLUCOSE BLDC GLUCOMTR-MCNC: 104 MG/DL — HIGH (ref 70–99)
GLUCOSE BLDC GLUCOMTR-MCNC: 166 MG/DL — HIGH (ref 70–99)
GLUCOSE BLDC GLUCOMTR-MCNC: 83 MG/DL — SIGNIFICANT CHANGE UP (ref 70–99)
GLUCOSE SERPL-MCNC: 83 MG/DL — SIGNIFICANT CHANGE UP (ref 70–99)
HCT VFR BLD CALC: 35 % — LOW (ref 42–52)
HCV AB S/CO SERPL IA: 0.06 COI — SIGNIFICANT CHANGE UP
HCV AB SERPL-IMP: SIGNIFICANT CHANGE UP
HDLC SERPL-MCNC: 30 MG/DL — LOW
HGB BLD-MCNC: 11.5 G/DL — LOW (ref 14–18)
HIV 1+2 AB+HIV1 P24 AG SERPL QL IA: SIGNIFICANT CHANGE UP
IMM GRANULOCYTES NFR BLD AUTO: 1.3 % — HIGH (ref 0.1–0.3)
LACTATE SERPL-SCNC: 0.8 MMOL/L — SIGNIFICANT CHANGE UP (ref 0.7–2)
LIPID PNL WITH DIRECT LDL SERPL: 17 MG/DL — SIGNIFICANT CHANGE UP
LYMPHOCYTES # BLD AUTO: 1.77 K/UL — SIGNIFICANT CHANGE UP (ref 1.2–3.4)
LYMPHOCYTES # BLD AUTO: 37.1 % — SIGNIFICANT CHANGE UP (ref 20.5–51.1)
MAGNESIUM SERPL-MCNC: 1.5 MG/DL — LOW (ref 1.8–2.4)
MCHC RBC-ENTMCNC: 26.4 PG — LOW (ref 27–31)
MCHC RBC-ENTMCNC: 32.9 G/DL — SIGNIFICANT CHANGE UP (ref 32–37)
MCV RBC AUTO: 80.3 FL — SIGNIFICANT CHANGE UP (ref 80–94)
MONOCYTES # BLD AUTO: 0.37 K/UL — SIGNIFICANT CHANGE UP (ref 0.1–0.6)
MONOCYTES NFR BLD AUTO: 7.8 % — SIGNIFICANT CHANGE UP (ref 1.7–9.3)
NEUTROPHILS # BLD AUTO: 2.38 K/UL — SIGNIFICANT CHANGE UP (ref 1.4–6.5)
NEUTROPHILS NFR BLD AUTO: 49.8 % — SIGNIFICANT CHANGE UP (ref 42.2–75.2)
NON HDL CHOLESTEROL: 31 MG/DL — SIGNIFICANT CHANGE UP
NRBC # BLD: 0 /100 WBCS — SIGNIFICANT CHANGE UP (ref 0–0)
NT-PROBNP SERPL-SCNC: 487 PG/ML — HIGH (ref 0–300)
PLATELET # BLD AUTO: 350 K/UL — SIGNIFICANT CHANGE UP (ref 130–400)
POTASSIUM SERPL-MCNC: 3.4 MMOL/L — LOW (ref 3.5–5)
POTASSIUM SERPL-SCNC: 3.4 MMOL/L — LOW (ref 3.5–5)
PROCALCITONIN SERPL-MCNC: 0.1 NG/ML — SIGNIFICANT CHANGE UP (ref 0.02–0.1)
PROT SERPL-MCNC: 4.4 G/DL — LOW (ref 6–8)
RBC # BLD: 4.36 M/UL — LOW (ref 4.7–6.1)
RBC # FLD: 16.5 % — HIGH (ref 11.5–14.5)
SODIUM SERPL-SCNC: 143 MMOL/L — SIGNIFICANT CHANGE UP (ref 135–146)
SPECIMEN SOURCE: SIGNIFICANT CHANGE UP
TRIGL SERPL-MCNC: 74 MG/DL — SIGNIFICANT CHANGE UP
TROPONIN T SERPL-MCNC: 0.03 NG/ML — CRITICAL HIGH
TROPONIN T SERPL-MCNC: 0.04 NG/ML — CRITICAL HIGH
WBC # BLD: 4.77 K/UL — LOW (ref 4.8–10.8)
WBC # FLD AUTO: 4.77 K/UL — LOW (ref 4.8–10.8)

## 2022-06-27 PROCEDURE — 76700 US EXAM ABDOM COMPLETE: CPT | Mod: 26

## 2022-06-27 PROCEDURE — 99233 SBSQ HOSP IP/OBS HIGH 50: CPT

## 2022-06-27 PROCEDURE — 99222 1ST HOSP IP/OBS MODERATE 55: CPT

## 2022-06-27 RX ORDER — TUBERCULIN PURIFIED PROTEIN DERIVATIVE 5 [IU]/.1ML
5 INJECTION, SOLUTION INTRADERMAL ONCE
Refills: 0 | Status: DISCONTINUED | OUTPATIENT
Start: 2022-06-27 | End: 2022-07-01

## 2022-06-27 RX ORDER — BEBTELOVIMAB 87.5 MG/ML
175 INJECTION, SOLUTION INTRAVENOUS ONCE
Refills: 0 | Status: COMPLETED | OUTPATIENT
Start: 2022-06-27 | End: 2022-06-27

## 2022-06-27 RX ADMIN — Medication 500 MILLIGRAM(S): at 05:11

## 2022-06-27 RX ADMIN — Medication 100 MILLIGRAM(S): at 17:40

## 2022-06-27 RX ADMIN — PANTOPRAZOLE SODIUM 40 MILLIGRAM(S): 20 TABLET, DELAYED RELEASE ORAL at 05:11

## 2022-06-27 RX ADMIN — BEBTELOVIMAB 175 MILLIGRAM(S): 87.5 INJECTION, SOLUTION INTRAVENOUS at 17:45

## 2022-06-27 RX ADMIN — Medication 10 MILLILITER(S): at 17:41

## 2022-06-27 RX ADMIN — Medication 100 MILLIGRAM(S): at 11:39

## 2022-06-27 RX ADMIN — Medication 2: at 11:39

## 2022-06-27 RX ADMIN — CEFTRIAXONE 100 MILLIGRAM(S): 500 INJECTION, POWDER, FOR SOLUTION INTRAMUSCULAR; INTRAVENOUS at 11:46

## 2022-06-27 RX ADMIN — Medication 10 MILLILITER(S): at 05:11

## 2022-06-27 RX ADMIN — INSULIN GLARGINE 8 UNIT(S): 100 INJECTION, SOLUTION SUBCUTANEOUS at 22:35

## 2022-06-27 RX ADMIN — Medication 100 MILLIGRAM(S): at 02:30

## 2022-06-27 RX ADMIN — Medication 500 MILLIGRAM(S): at 22:36

## 2022-06-27 RX ADMIN — Medication 500 MILLIGRAM(S): at 15:00

## 2022-06-27 RX ADMIN — Medication 10 MILLILITER(S): at 11:39

## 2022-06-27 NOTE — PROGRESS NOTE ADULT - SUBJECTIVE AND OBJECTIVE BOX
ROSS DIETRICH 71y Male  MRN#: [id number]   CODE STATUS:________    Hospital Day: 1d    Pt is currently admitted with the primary diagnosis of weight loss and diarrhea.       SUBJECTIVE  71 yr old male with pmh DM & HLD was presented with 2 month hx of diarrhea, weight loss and cough.   ROS+ Dyspnea on exertion, cough, lower extremity swelling, diarrhea         Present Today:     OBJECTIVE  PAST MEDICAL & SURGICAL HISTORY  Diabetes mellitus    Hyperlipidemia    No significant past surgical history                                                ALLERGIES:  No Known Allergies                           HOME MEDICATIONS  Home Medications:  albuterol 200 mcg inhalation capsule:  (2022 11:05)  aspirin 81 mg oral tablet: 1 tab(s) orally once a day (2022 11:06)                           MEDICATIONS:  STANDING MEDICATIONS  bebtelovimab (EUA) Injectable 175 milliGRAM(s) IV Push once  benzonatate 100 milliGRAM(s) Oral every 8 hours  cefTRIAXone   IVPB 1000 milliGRAM(s) IV Intermittent every 24 hours  guaifenesin/dextromethorphan Oral Liquid 10 milliLiter(s) Oral every 6 hours  insulin glargine Injectable (LANTUS) 8 Unit(s) SubCutaneous at bedtime  insulin lispro (ADMELOG) corrective regimen sliding scale   SubCutaneous three times a day before meals  metroNIDAZOLE    Tablet 500 milliGRAM(s) Oral every 8 hours  pantoprazole    Tablet 40 milliGRAM(s) Oral before breakfast  PPD  5 Tuberculin Unit(s) Injectable 5 Unit(s) IntraDermal once    PRN MEDICATIONS  albuterol/ipratropium for Nebulization 3 milliLiter(s) Nebulizer every 6 hours PRN  morphine  - Injectable 2 milliGRAM(s) IV Push every 6 hours PRN  oxycodone    5 mG/acetaminophen 325 mG 1 Tablet(s) Oral every 6 hours PRN                                            ------------------------------------------------------------  VITAL SIGNS: Last 24 Hours  T(C): 36.2 (2022 16:10), Max: 36.2 (2022 16:10)  T(F): 97.2 (2022 16:10), Max: 97.2 (2022 16:10)  HR: 77 (2022 16:10) (77 - 81)  BP: 171/72 (2022 16:10) (146/67 - 171/72)  BP(mean): --  RR: 18 (2022 16:10) (18 - 18)  SpO2: --      22 @ 07:01  -  22 @ 17:28  --------------------------------------------------------  IN: 480 mL / OUT: 0 mL / NET: 480 mL                                               LABS:                        11.5   4.77  )-----------( 350      ( 2022 05:36 )             35.0         143  |  108  |  15  ----------------------------<  83  3.4<L>   |  23  |  0.9    Ca    8.0<L>      2022 05:36  Mg     1.5         TPro  4.4<L>  /  Alb  2.4<L>  /  TBili  0.2  /  DBili  x   /  AST  46<H>  /  ALT  12  /  AlkPhos  84        Urinalysis Basic - ( 2022 01:00 )    Color: Light Yellow / Appearance: Clear / S.015 / pH: x  Gluc: x / Ketone: Negative  / Bili: Negative / Urobili: <2 mg/dL   Blood: x / Protein: Negative / Nitrite: Negative   Leuk Esterase: Negative / RBC: x / WBC x   Sq Epi: x / Non Sq Epi: x / Bacteria: x        Troponin T, Serum: 0.03 ng/mL *HH* (22 @ 12:27)  Troponin T, Serum: 0.04 ng/mL *HH* (22 @ 05:36)  Sedimentation Rate, Erythrocyte: 5 mm/Hr (22 @ 05:36)  Lactate, Blood: 0.8 mmol/L (22 @ 05:36)          CARDIAC MARKERS ( 2022 12:27 )  x     / 0.03 ng/mL / x     / x     / x      CARDIAC MARKERS ( 2022 05:36 )  x     / 0.04 ng/mL / x     / x     / x      CARDIAC MARKERS ( 2022 11:47 )  x     / 0.04 ng/mL / x     / x     / x      CARDIAC MARKERS ( 2022 01:00 )  x     / 0.04 ng/mL / x     / x     / x                                                  RADIOLOGY:        PHYSICAL EXAM:  GENERAL: NAD, lying in bed comfortably  HEAD:  Atraumatic, Normocephalic  EYES: EOMI, PERRLA, conjunctiva and sclera clear  ENT: Moist mucous membranes  NECK: Supple, No JVD  CHEST/LUNG: Clear to auscultation bilaterally; No rales, rhonchi, wheezing, or rubs. Unlabored respirations  HEART: Regular rate and rhythm; No murmurs, rubs, or gallops  ABDOMEN: BSx4; Soft, nontender, nondistended  EXTREMITIES:  2+ Peripheral Pulses, brisk capillary refill. No clubbing, cyanosis, or edema  NERVOUS SYSTEM:  A&Ox3, no focal deficits   SKIN: No rashes or lesions                                         ASSESSMENT & PLAN      71 yr old male with pmh DM & HLD was presented with 2 month hx of diarrhea, weight loss and cough.   ROS+ Dyspnea on exertion, cough, lower extremity swelling, diarrhea     1. Necrotic Mesenteric Lymphadenitis   -patient not septic now nor on admission   -CT remarkable for long segment ileitis with mesenteric and retroperitoneal necrotic lymphadenopathy   retroperitoneal lymph nodes fully compressing IVC  -GI on board and recommended labs ordered. Patient to remain on clear liq diet.       2. COVID 19 infection   patient respirating ok on ra.   VACCINATED X3    3. DM2   A1C 5.6   FS MONITORING   B/B INSULIN                                                                                                   ----------------------------------------------------  # DVT prophylaxis     # GI prophylaxis     # Diet     # Activity Score (AM-PAC)    # Code status     # Disposition                                                                              --------------------------------------------------------    # Handoff      ROSS DIETRICH 71y Male  MRN#: [id number]   CODE STATUS:________    Hospital Day: 1d    Pt is currently admitted with the primary diagnosis of weight loss and diarrhea.       SUBJECTIVE  71 yr old male with pmh DM & HLD was presented with 2 month hx of diarrhea, weight loss and cough.   ROS+ Dyspnea on exertion, cough, lower extremity swelling, diarrhea         Present Today:     OBJECTIVE  PAST MEDICAL & SURGICAL HISTORY  Diabetes mellitus    Hyperlipidemia    No significant past surgical history                                                ALLERGIES:  No Known Allergies                           HOME MEDICATIONS  Home Medications:  albuterol 200 mcg inhalation capsule:  (2022 11:05)  aspirin 81 mg oral tablet: 1 tab(s) orally once a day (2022 11:06)                           MEDICATIONS:  STANDING MEDICATIONS  bebtelovimab (EUA) Injectable 175 milliGRAM(s) IV Push once  benzonatate 100 milliGRAM(s) Oral every 8 hours  cefTRIAXone   IVPB 1000 milliGRAM(s) IV Intermittent every 24 hours  guaifenesin/dextromethorphan Oral Liquid 10 milliLiter(s) Oral every 6 hours  insulin glargine Injectable (LANTUS) 8 Unit(s) SubCutaneous at bedtime  insulin lispro (ADMELOG) corrective regimen sliding scale   SubCutaneous three times a day before meals  metroNIDAZOLE    Tablet 500 milliGRAM(s) Oral every 8 hours  pantoprazole    Tablet 40 milliGRAM(s) Oral before breakfast  PPD  5 Tuberculin Unit(s) Injectable 5 Unit(s) IntraDermal once    PRN MEDICATIONS  albuterol/ipratropium for Nebulization 3 milliLiter(s) Nebulizer every 6 hours PRN  morphine  - Injectable 2 milliGRAM(s) IV Push every 6 hours PRN  oxycodone    5 mG/acetaminophen 325 mG 1 Tablet(s) Oral every 6 hours PRN                                            ------------------------------------------------------------  VITAL SIGNS: Last 24 Hours  T(C): 36.2 (2022 16:10), Max: 36.2 (2022 16:10)  T(F): 97.2 (2022 16:10), Max: 97.2 (2022 16:10)  HR: 77 (2022 16:10) (77 - 81)  BP: 171/72 (2022 16:10) (146/67 - 171/72)  BP(mean): --  RR: 18 (2022 16:10) (18 - 18)  SpO2: --      22 @ 07:01  -  22 @ 17:28  --------------------------------------------------------  IN: 480 mL / OUT: 0 mL / NET: 480 mL                                               LABS:                        11.5   4.77  )-----------( 350      ( 2022 05:36 )             35.0         143  |  108  |  15  ----------------------------<  83  3.4<L>   |  23  |  0.9    Ca    8.0<L>      2022 05:36  Mg     1.5         TPro  4.4<L>  /  Alb  2.4<L>  /  TBili  0.2  /  DBili  x   /  AST  46<H>  /  ALT  12  /  AlkPhos  84        Urinalysis Basic - ( 2022 01:00 )    Color: Light Yellow / Appearance: Clear / S.015 / pH: x  Gluc: x / Ketone: Negative  / Bili: Negative / Urobili: <2 mg/dL   Blood: x / Protein: Negative / Nitrite: Negative   Leuk Esterase: Negative / RBC: x / WBC x   Sq Epi: x / Non Sq Epi: x / Bacteria: x        Troponin T, Serum: 0.03 ng/mL *HH* (22 @ 12:27)  Troponin T, Serum: 0.04 ng/mL *HH* (22 @ 05:36)  Sedimentation Rate, Erythrocyte: 5 mm/Hr (22 @ 05:36)  Lactate, Blood: 0.8 mmol/L (22 @ 05:36)          CARDIAC MARKERS ( 2022 12:27 )  x     / 0.03 ng/mL / x     / x     / x      CARDIAC MARKERS ( 2022 05:36 )  x     / 0.04 ng/mL / x     / x     / x      CARDIAC MARKERS ( 2022 11:47 )  x     / 0.04 ng/mL / x     / x     / x      CARDIAC MARKERS ( 2022 01:00 )  x     / 0.04 ng/mL / x     / x     / x                                                  RADIOLOGY:        PHYSICAL EXAM:  GENERAL: NAD, lying in bed comfortably   HEAD:  Atraumatic, Normocephalic  EYES: EOMI, PERRLA, conjunctiva and sclera clear  ENT: Moist mucous membranes  NECK: Supple, No JVD  CHEST/LUNG: diffuse wheezes bilaterally; No rales, rhonchi, wheezing, or rubs. Unlabored respirations  HEART: Regular rate and rhythm; No murmurs, rubs, or gallops  ABDOMEN: BSx4; Soft, nontender, nondistended  EXTREMITIES:  2+ Peripheral Pulses, brisk capillary refill. No clubbing, cyanosis, 1+ edema L>R  NERVOUS SYSTEM:  A&Ox3  SKIN: No rashes or lesions                                         ASSESSMENT & PLAN      71 yr old male with pmh DM & HLD was presented with 2 month hx of diarrhea, weight loss and cough.   ROS+ Dyspnea on exertion, cough, lower extremity swelling, diarrhea     1. Necrotic Mesenteric Lymphadenitis   -patient not septic now nor on admission   -CT remarkable for long segment ileitis with mesenteric and retroperitoneal necrotic lymphadenopathy   retroperitoneal lymph nodes fully compressing IVC  -GI on board and recommended labs ordered. Patient to remain on clear liq diet.   -ID on board and recommended labs ordered. Patient on r/o tb airborne precautions.   - IR consulted for LN biopsy         2. COVID 19 infection   patient respirating ok on ra.   VACCINATED X3      3. DM2   A1C 5.6   FS MONITORING   B/B INSULIN                                                                                                   ----------------------------------------------------  # DVT prophylaxis     # GI prophylaxis     # Diet     # Activity Score (AM-PAC)    # Code status     # Disposition                                                                              --------------------------------------------------------    Evelin Melchor

## 2022-06-27 NOTE — PROGRESS NOTE ADULT - SUBJECTIVE AND OBJECTIVE BOX
Patient is a 71y old  Male who presents with a chief complaint of Cough (2022 10:24)    Patient was seen and examined.  C/o loss of wt , appetite  C/o SABA, cough  C/o lower ext swelling  C/o diarrhea  Denies any other complaints.  All systems reviewed positive history as mentioned above.    PAST MEDICAL & SURGICAL HISTORY:  Diabetes mellitus  Hyperlipidemia    No significant past surgical history    Allergies  No Known Allergies    MEDICATIONS  (STANDING):  bebtelovimab (EUA) Injectable 175 milliGRAM(s) IV Push once  benzonatate 100 milliGRAM(s) Oral every 8 hours  cefTRIAXone   IVPB 1000 milliGRAM(s) IV Intermittent every 24 hours  guaifenesin/dextromethorphan Oral Liquid 10 milliLiter(s) Oral every 6 hours  insulin glargine Injectable (LANTUS) 8 Unit(s) SubCutaneous at bedtime  insulin lispro (ADMELOG) corrective regimen sliding scale   SubCutaneous three times a day before meals  lactated ringers. 500 milliLiter(s) (70 mL/Hr) IV Continuous <Continuous>  metroNIDAZOLE    Tablet 500 milliGRAM(s) Oral every 8 hours  pantoprazole    Tablet 40 milliGRAM(s) Oral before breakfast    MEDICATIONS  (PRN):  albuterol/ipratropium for Nebulization 3 milliLiter(s) Nebulizer every 6 hours PRN Bronchospasm  morphine  - Injectable 2 milliGRAM(s) IV Push every 6 hours PRN Severe Pain (7 - 10)  oxycodone    5 mG/acetaminophen 325 mG 1 Tablet(s) Oral every 6 hours PRN Moderate Pain (4 - 6)    Vital Signs Last 24 Hrs  T(C): 36.2  T(F): 97.2  HR: 77  BP: 171/72  BP(mean): --  RR: 18  SpO2: --    O/E:  Awake, alert, not in distress.  HEENT: atraumatic, EOMI.  Chest: clear.  CVS: SIS2 +, no murmur.  P/A: Soft, BS+  CNS: awake, alert  Ext: n lower ext edema+  Skin: no rash, no ulcers.  All systems reviewed positive findings as above.    POCT Blood Glucose.: 166 mg/dL (2022 11:02)  POCT Blood Glucose.: 113 mg/dL (2022 21:40)  POCT Blood Glucose.: 119 mg/dL (2022 18:11)                        11.5<L>  4.77<L> )-----------( 350      ( 2022 05:36 )             35.0<L>                        10.9<L>  5.77  )-----------( 334      ( 2022 11:47 )             32.9<L>    06-27    143  |  108  |  15  ----------------------------<  83  3.4<L>   |  23  |  0.9      134<L>  |  102  |  19  ----------------------------<  170<H>  3.8   |  25  |  1.0    Ca    8.0<L>      2022 05:36  Ca    7.9<L>      2022 11:47  Ca    8.1<L>      2022 01:00  Mg     1.5     -    TPro  4.4<L>  /  Alb  2.4<L>  /  TBili  0.2  /  DBili  x   /  AST  46<H>  /  ALT  12  /  AlkPhos  84  06-27  TPro  4.3<L>  /  Alb  2.2<L>  /  TBili  <0.2  /  DBili  x   /  AST  41  /  ALT  12  /  AlkPhos  75  06-26  TPro  4.7<L>  /  Alb  2.4<L>  /  TBili  <0.2  /  DBili  x   /  AST  51<H>  /  ALT  14  /  AlkPhos  84  06-26      CARDIAC MARKERS ( 2022 12:27 )  x     / 0.03 ng/mL<HH> / x     / x     / x      CARDIAC MARKERS ( 2022 05:36 )  x     / 0.04 ng/mL<HH> / x     / x     / x      CARDIAC MARKERS ( 2022 11:47 )  x     / 0.04 ng/mL<HH> / x     / x     / x            Urinalysis Basic - ( 2022 01:00 )    Color: Light Yellow / Appearance: Clear / S.015 / pH: x  Gluc: x / Ketone: Negative  / Bili: Negative / Urobili: <2 mg/dL   Blood: x / Protein: Negative / Nitrite: Negative   Leuk Esterase: Negative / RBC: x / WBC x   Sq Epi: x / Non Sq Epi: x / Bacteria: x

## 2022-06-27 NOTE — CONSULT NOTE ADULT - ASSESSMENT
71-year-old male with a history of HLD and DM presenting for cough for the last 2 months. Patient describes a nonproductive cough that started 2 months ago and that has gradually worsened over time; there is no associated chest pain or SOB. Associated symptoms started 2 months ago include weight loss (unknown how much), decreased appetite, fatigue, abdominal pain and diarrhea. Patient states his small volume watery diarrhea 3-4 episodes/day began 1.5 months ago while in [place]. Denies any other sick contacts. His cough preceded his GI symptoms. The diarrhea occurs both during day and night and is worsened with food but no evidence of blood in his stools. endorses occsional cramps. Denies any fever, night sweats, chills, but endorses 10 lb weight loss. No family hx of IBD or colon  cancer    #Chronic Diarrhea w/ Long segment ileitis (sparing TI) w/ mesenteric lymphadenitis (partially necrotizing) on imaging - r/o Infectious etiology (TB vs Sarcoid Vs Histo?) Lymphoma vs IBD vs Colon Ca  - last cf 2 years ago per patient - unsure of findings  - Pt denies fever, night sweats. but endorses weight loss    Plan  - please send GI PCR, C. Diff, stool culture ova and parasites, fecal calprotectin and lactoferrin  - please send fungitell  - please send IFN gamma release assay  - please obtain four quadrant sono to eval for ascites - if positive please obtain diagnostic tap and send Adenosine deaminase levels  - please keep on clears for now  - if infectious workup is negative and once pulmonary status improves and pt has persistent diarrhea may consider colonoscopy w/ TI biopsies  - ID evaluation

## 2022-06-27 NOTE — CONSULT NOTE ADULT - SUBJECTIVE AND OBJECTIVE BOX
ROSS DIETRICH  71y, Male  Allergy: No Known Allergies      CHIEF COMPLAINT:   Cough (2022 09:11)      LOS  1d    HPI  HPI:  71-year-old male with a history of HLD and DM presenting for cough for the last 2 months. Patient describes a nonproductive cough that started 2 months ago and that has gradually worsened over time; there is no associated chest pain or SOB. Associated symptoms started 2 months ago include weight loss (unknown how much), decreased appetite, fatigue, abdominal pain and diarrhea. Patient endorses feeling sick for a while and not eating appropriately Diarrhea episodes were postprandial associated with epigastric pain; alternating diarrhea with lose stools have were dark red in color and now greenish (as per patient) with no mucus.  Patient recently traveled to [facility] for 4 months total, returning 3 [place] ago to [place].   Over the past 2 to 3 days he has been having bilateral symmetric lower extremity pitting edema worse on the left leg than on the right, after the flight. No fever/chills, sore throat, palpitations, NV, dysuria, hematuria, new joint pain, FND, rash, trauma; denies any sick contact or exposure to animals (no cattle or home pets neither here or in [facility]     ED vitals stable  >>> CT chest abdomen pelvis done in ED     Vital Signs Last 24 Hrs  T(C): 36.4 (2022 07:51), Max: 36.8 (2022 22:09)  T(F): 97.6 (2022 07:51), Max: 98.3 (2022 22:09)  HR: 84 (2022 07:51) (84 - 87)  BP: 140/64 (2022 07:51) (140/64 - 146/65)  BP(mean): --  RR: 18 (2022 07:51) (18 - 18)  SpO2: 99% (2022 07:51) (98% - 100%) (2022 07:41)      INFECTIOUS DISEASE HISTORY:  ID consulted for     [facility]  PAST MEDICAL & SURGICAL HISTORY:  Diabetes mellitus      Hyperlipidemia      No significant past surgical history          FAMILY HISTORY  non-contributory     SOCIAL HISTORY  Social History:  No smoking   No alcohol intake  No Ilicit drug use    Sexual history: monogamous with wife (2022 07:41)        ROS  ***    VITALS:  T(F): 96.9, Max: 96.9 (22 @ 21:16)  HR: 81  BP: 146/67  RR: 18Vital Signs Last 24 Hrs  T(C): 36.1 (2022 21:16), Max: 36.1 (2022 21:16)  T(F): 96.9 (2022 21:16), Max: 96.9 (2022 21:16)  HR: 81 (2022 21:16) (81 - 87)  BP: 146/67 (2022 21:16) (146/67 - 156/69)  BP(mean): --  RR: 18 (2022 21:16) (18 - 18)  SpO2: 99% (2022 15:51) (99% - 99%)    PHYSICAL EXAM:  ***    TESTS & MEASUREMENTS:                        11.5   4.77  )-----------( 350      ( 2022 05:36 )             35.0         143  |  108  |  15  ----------------------------<  83  3.4<L>   |  23  |  0.9    Ca    8.0<L>      2022 05:36  Mg     1.5         TPro  4.4<L>  /  Alb  2.4<L>  /  TBili  0.2  /  DBili  x   /  AST  46<H>  /  ALT  12  /  AlkPhos  84        LIVER FUNCTIONS - ( 2022 05:36 )  Alb: 2.4 g/dL / Pro: 4.4 g/dL / ALK PHOS: 84 U/L / ALT: 12 U/L / AST: 46 U/L / GGT: x           Urinalysis Basic - ( 2022 01:00 )    Color: Light Yellow / Appearance: Clear / S.015 / pH: x  Gluc: x / Ketone: Negative  / Bili: Negative / Urobili: <2 mg/dL   Blood: x / Protein: Negative / Nitrite: Negative   Leuk Esterase: Negative / RBC: x / WBC x   Sq Epi: x / Non Sq Epi: x / Bacteria: x          Lactate, Blood: 1.4 mmol/L (22 @ 11:47)  Lactate, Blood: 2.2 mmol/L (22 @ 01:00)  Blood Gas Venous - Lactate: 2.10 mmol/L (22 @ 00:59)      INFECTIOUS DISEASES TESTING  Rapid RVP Result: Detected (22 @ 22:58)      INFLAMMATORY MARKERS  C-Reactive Protein, Serum: 5.6 mg/L (22 @ 05:36)      RADIOLOGY & ADDITIONAL TESTS:  I have personally reviewed the last Chest xray  CXR  Xray Chest 1 View- PORTABLE-Urgent:   ACC: [id number] EXAM:  XR CHEST PORTABLE URGENT 1V                          PROCEDURE DATE:  2022          INTERPRETATION:  Clinical History / Reason for exam: Shortness of breath,   suspecting pneumothorax.    Comparison : None.    Technique/Positioning: Frontal view of the chest.    Findings:    Support devices: None.    Cardiac/mediastinum/hilum: Unremarkable.    Lung parenchyma/Pleura: No focal consolidation, pleural effusion or   pneumothorax.    Skeleton/soft tissues: Degenerative changes.    Impression:    No radiographic evidence of acute cardiopulmonary disease.        --- End of Report ---          REZA BLOOM MD; Resident Radiologist  This document has been electronically signed.  WON CHERRY MD; Attending Radiologist  This document has been electronically signed. 2022 12:52AM (22 @ 23:42)      CT  CT Abdomen and Pelvis w/ IV Cont:   ACC: [id number] EXAM:  CT ABDOMEN AND PELVIS IC                          *** ADDENDUM***    Infectious etiologies include tuberculosis and fungal disease.    Spoke with Dr. De La Garza.    --- End of Report ---    *** END OF ADDENDUM***      PROCEDURE DATE:  2022          INTERPRETATION:  CLINICAL STATEMENT: Abdominal pain    TECHNIQUE: Contiguous axial CT images were obtained from the lower chest   to the pubic symphysis following administration of intravenous contrast.    Oral contrast was not administered.  Reformatted images in the coronal   and sagittal planes were acquired.    COMPARISON : None.      FINDINGS:    LOWER CHEST: Unremarkable.    HEPATOBILIARY: Unremarkable.    SPLEEN: Unremarkable.    PANCREAS: Unremarkable.    ADRENAL GLANDS: Unremarkable.    KIDNEYS: Symmetric renal enhancement. No hydronephrosis. 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 Nonobstructing 4 mm left renal calculus. Left subcentimeter   renal hypodensities too small to characterize.    ABDOMINOPELVIC NODES: Numerous enlarged mesenteric and retroperitoneal   lymph nodes, some of which are necrotic, measuring up to 1.4 cm (2-49)   with surrounding haziness of the mesenteric fat. Retroperitoneal lymph   nodes appear to focally compressed IVC.    PELVIC ORGANS: Unremarkable.    PERITONEUM/MESENTERY/BOWEL: Long segment abnormal right abdominal ileal   wall thickening with mucosal hyperenhancement; terminal ileum appears to   be spared (series 3, image [id number])    BONES/SOFT TISSUES: Degenerative changes of the spine with grade 1   anterolisthesis L5 on S1.    OTHER: Vascular calcifications.      IMPRESSION:    Long segment ileitis with extensive mesenteric and retroperitoneal   lymphadenopathy (some of which are necrotic) with stranding of mesentery.   Mesenteric lymphadenitis secondary to enteritis, lymphadenitis from   inflammatory etiologies, and lymphoma are considerations, and further   evaluation recommended.    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    --- End of Report ---    ***Please see the addendum at the top of this report. It may contain   additional important information or changes.****      REZA BLOOM MD; Resident Radiologist  This document has been electronically signed.  WON CHERRY MD; Attending Radiologist  This document has been electronically signed. 2022  3:42AM  Addend:WON CHERRY MD; Attending Radiologist  This addendum was electronically signed on: 2022  3:54AM. (22 @ 02:47)      CARDIOLOGY TESTING  12 Lead ECG:   Ventricular Rate 89 BPM    Atrial Rate 89 BPM    P-R Interval 174 ms    QRS Duration 80 ms    Q-T Interval 368 ms    QTC Calculation(Bazett) 447 ms    P Axis 71 degrees    R Axis 71 degrees    T Axis 66 degrees    Diagnosis Line Normal sinus rhythm  Low voltage QRS  Cannot rule out Anterior infarct , age undetermined  Abnormal ECG    Confirmed by Erickson Bowens ([id number]) on 2022 11:41:57 AM (22 @ 01:43)      MEDICATIONS  benzonatate 100 Oral every 8 hours  cefTRIAXone   IVPB 1000 IV Intermittent every 24 hours  guaifenesin/dextromethorphan Oral Liquid 10 Oral every 6 hours  insulin glargine Injectable (LANTUS) 8 SubCutaneous at bedtime  insulin lispro (ADMELOG) corrective regimen sliding scale  SubCutaneous three times a day before meals  lactated ringers. 500 IV Continuous <Continuous>  metroNIDAZOLE    Tablet 500 Oral every 8 hours  pantoprazole    Tablet 40 Oral before breakfast        ANTIBIOTICS:  cefTRIAXone   IVPB 1000 milliGRAM(s) IV Intermittent every 24 hours  metroNIDAZOLE    Tablet 500 milliGRAM(s) Oral every 8 hours      ALLERGIES:  No Known Allergies         ROSS DIETRICH  71y, Male  Allergy: No Known Allergies      CHIEF COMPLAINT:   Cough (2022 09:11)      LOS  1d    HPI  HPI:  71-year-old male with a history of HLD and DM presenting for cough for the last 2 months. Patient describes a nonproductive cough that started 2 months ago and that has gradually worsened over time; there is no associated chest pain or SOB. Associated symptoms started 2 months ago include weight loss (unknown how much), decreased appetite, fatigue, abdominal pain and diarrhea. Patient endorses feeling sick for a while and not eating appropriately Diarrhea episodes were postprandial associated with epigastric pain; alternating diarrhea with lose stools have were dark red in color and now greenish (as per patient) with no mucus.  Patient recently traveled to [place] for 4 months total, returning 3 days ago to [place].   Over the past 2 to 3 days he has been having bilateral symmetric lower extremity pitting edema worse on the left leg than on the right, after the flight. No fever/chills, sore throat, palpitations, NV, dysuria, hematuria, new joint pain, FND, rash, trauma; denies any sick contact or exposure to animals (no cattle or home pets neither here or in [place]     ED vitals stable  >>> CT chest abdomen pelvis done in ED     Vital Signs Last 24 Hrs  T(C): 36.4 (2022 07:51), Max: 36.8 (2022 22:09)  T(F): 97.6 (2022 07:51), Max: 98.3 (2022 22:09)  HR: 84 (2022 07:51) (84 - 87)  BP: 140/64 (2022 07:51) (140/64 - 146/65)  BP(mean): --  RR: 18 (2022 07:51) (18 - 18)  SpO2: 99% (2022 07:51) (98% - 100%) (2022 07:41)      INFECTIOUS DISEASE HISTORY:  ID consulted for covid and ileitis  has been in [place] for 4 mo and returned recently  having diarrhea x 2 mo, no blood, + weakness  + wt loss, used to be 200 lbs 4 mo ago  no night sweats  no fever  no known TB   previously worked as   non-smoker, no ETOH, no drugs  no pets        PMH  PAST MEDICAL & SURGICAL HISTORY:  Diabetes mellitus      Hyperlipidemia      No significant past surgical history          FAMILY HISTORY  non-contributory     SOCIAL HISTORY  Social History:  No smoking   No alcohol intake  No Ilicit drug use    Sexual history: monogamous with wife (2022 07:41)        ROS  General: Denies rigors, nightsweats  HEENT: Denies headache, rhinorrhea, sore throat, eye pain  CV: Denies CP, palpitations  PULM: Denies wheezing, hemoptysis  GI: as noted above   : Denies discharge, hematuria  MSK: Denies arthralgias, myalgias  SKIN: Denies rash, lesions  NEURO: Denies paresthesias, weakness  PSYCH: Denies depression, anxiety     VITALS:  T(F): 96.9, Max: 96.9 (22 @ 21:16)  HR: 81  BP: 146/67  RR: 18Vital Signs Last 24 Hrs  T(C): 36.1 (2022 21:16), Max: 36.1 (2022 21:16)  T(F): 96.9 (2022 21:16), Max: 96.9 (2022 21:16)  HR: 81 (2022 21:16) (81 - 87)  BP: 146/67 (2022 21:16) (146/67 - 156/69)  BP(mean): --  RR: 18 (2022 21:16) (18 - 18)  SpO2: 99% (2022 15:51) (99% - 99%)    PHYSICAL EXAM:  Gen: NAD, resting in bed  HEENT: Normocephalic, atraumatic  Neck: supple, no lymphadenopathy  CV: Regular rate & regular rhythm  Lungs: decreased BS at bases, no fremitus  Abdomen: Soft, BS present  Ext: Warm, well perfused  Neuro: non focal, awake  Skin: no rash, no erythema  Lines: no phlebitis     TESTS & MEASUREMENTS:                        11.5   4.77  )-----------( 350      ( 2022 05:36 )             35.0     06-27    143  |  108  |  15  ----------------------------<  83  3.4<L>   |  23  |  0.9    Ca    8.0<L>      2022 05:36  Mg     1.5         TPro  4.4<L>  /  Alb  2.4<L>  /  TBili  0.2  /  DBili  x   /  AST  46<H>  /  ALT  12  /  AlkPhos  84        LIVER FUNCTIONS - ( 2022 05:36 )  Alb: 2.4 g/dL / Pro: 4.4 g/dL / ALK PHOS: 84 U/L / ALT: 12 U/L / AST: 46 U/L / GGT: x           Urinalysis Basic - ( 2022 01:00 )    Color: Light Yellow / Appearance: Clear / S.015 / pH: x  Gluc: x / Ketone: Negative  / Bili: Negative / Urobili: <2 mg/dL   Blood: x / Protein: Negative / Nitrite: Negative   Leuk Esterase: Negative / RBC: x / WBC x   Sq Epi: x / Non Sq Epi: x / Bacteria: x          Lactate, Blood: 1.4 mmol/L (22 @ 11:47)  Lactate, Blood: 2.2 mmol/L (22 @ 01:00)  Blood Gas Venous - Lactate: 2.10 mmol/L (22 @ 00:59)      INFECTIOUS DISEASES TESTING  Rapid RVP Result: Detected (22 @ 22:58)      INFLAMMATORY MARKERS  C-Reactive Protein, Serum: 5.6 mg/L (22 @ 05:36)      RADIOLOGY & ADDITIONAL TESTS:  I have personally reviewed the last Chest xray  CXR  Xray Chest 1 View- PORTABLE-Urgent:   ACC: [id number] EXAM:  XR CHEST PORTABLE URGENT 1V                          PROCEDURE DATE:  2022          INTERPRETATION:  Clinical History / Reason for exam: Shortness of breath,   suspecting pneumothorax.    Comparison : None.    Technique/Positioning: Frontal view of the chest.    Findings:    Support devices: None.    Cardiac/mediastinum/hilum: Unremarkable.    Lung parenchyma/Pleura: No focal consolidation, pleural effusion or   pneumothorax.    Skeleton/soft tissues: Degenerative changes.    Impression:    No radiographic evidence of acute cardiopulmonary disease.        --- End of Report ---          REZA BLOOM MD; Resident Radiologist  This document has been electronically signed.  WON CHERRY MD; Attending Radiologist  This document has been electronically signed. 2022 12:52AM (22 @ 23:42)      CT  CT Abdomen and Pelvis w/ IV Cont:   ACC: [id number] EXAM:  CT ABDOMEN AND PELVIS IC                          *** ADDENDUM***    Infectious etiologies include tuberculosis and fungal disease.    Spoke with Dr. De La Garza.    --- End of Report ---    *** END OF ADDENDUM***      PROCEDURE DATE:  2022          INTERPRETATION:  CLINICAL STATEMENT: Abdominal pain    TECHNIQUE: Contiguous axial CT images were obtained from the lower chest   to the pubic symphysis following administration of intravenous contrast.    Oral contrast was not administered.  Reformatted images in the coronal   and sagittal planes were acquired.    COMPARISON : None.      FINDINGS:    LOWER CHEST: Unremarkable.    HEPATOBILIARY: Unremarkable.    SPLEEN: Unremarkable.    PANCREAS: Unremarkable.    ADRENAL GLANDS: Unremarkable.    KIDNEYS: Symmetric renal enhancement. No hydronephrosis. 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 Nonobstructing 4 mm left renal calculus. Left subcentimeter   renal hypodensities too small to characterize.    ABDOMINOPELVIC NODES: Numerous enlarged mesenteric and retroperitoneal   lymph nodes, some of which are necrotic, measuring up to 1.4 cm (2-49)   with surrounding haziness of the mesenteric fat. Retroperitoneal lymph   nodes appear to focally compressed IVC.    PELVIC ORGANS: Unremarkable.    PERITONEUM/MESENTERY/BOWEL: Long segment abnormal right abdominal ileal   wall thickening with mucosal hyperenhancement; terminal ileum appears to   be spared (series 3, image [id number])    BONES/SOFT TISSUES: Degenerative changes of the spine with grade 1   anterolisthesis L5 on S1.    OTHER: Vascular calcifications.      IMPRESSION:    Long segment ileitis with extensive mesenteric and retroperitoneal   lymphadenopathy (some of which are necrotic) with stranding of mesentery.   Mesenteric lymphadenitis secondary to enteritis, lymphadenitis from   inflammatory etiologies, and lymphoma are considerations, and further   evaluation recommended.    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    --- End of Report ---    ***Please see the addendum at the top of this report. It may contain   additional important information or changes.****      REZA BLOOM MD; Resident Radiologist  This document has been electronically signed.  WON CHERRY MD; Attending Radiologist  This document has been electronically signed. 2022  3:42AM  Addend:WON CHERRY MD; Attending Radiologist  This addendum was electronically signed on: 2022  3:54AM. (22 @ 02:47)      CARDIOLOGY TESTING  12 Lead ECG:   Ventricular Rate 89 BPM    Atrial Rate 89 BPM    P-R Interval 174 ms    QRS Duration 80 ms    Q-T Interval 368 ms    QTC Calculation(Bazett) 447 ms    P Axis 71 degrees    R Axis 71 degrees    T Axis 66 degrees    Diagnosis Line Normal sinus rhythm  Low voltage QRS  Cannot rule out Anterior infarct , age undetermined  Abnormal ECG    Confirmed by Erickson Bowens ([id number]) on 2022 11:41:57 AM (22 @ 01:43)      MEDICATIONS  benzonatate 100 Oral every 8 hours  cefTRIAXone   IVPB 1000 IV Intermittent every 24 hours  guaifenesin/dextromethorphan Oral Liquid 10 Oral every 6 hours  insulin glargine Injectable (LANTUS) 8 SubCutaneous at bedtime  insulin lispro (ADMELOG) corrective regimen sliding scale  SubCutaneous three times a day before meals  lactated ringers. 500 IV Continuous <Continuous>  metroNIDAZOLE    Tablet 500 Oral every 8 hours  pantoprazole    Tablet 40 Oral before breakfast        ANTIBIOTICS:  cefTRIAXone   IVPB 1000 milliGRAM(s) IV Intermittent every 24 hours  metroNIDAZOLE    Tablet 500 milliGRAM(s) Oral every 8 hours      ALLERGIES:  No Known Allergies

## 2022-06-27 NOTE — CONSULT NOTE ADULT - SUBJECTIVE AND OBJECTIVE BOX
Gastroenterology Consultation:    Patient is a 71y old  Male who presents with a chief complaint of Cough (26 Jun 2022 07:41)        Admitted on: 06-26-22      HPI:    71-year-old male with a history of HLD and DM presenting for cough for the last 2 months. Patient describes a nonproductive cough that started 2 months ago and that has gradually worsened over time; there is no associated chest pain or SOB. Associated symptoms started 2 months ago include weight loss (unknown how much), decreased appetite, fatigue, abdominal pain and diarrhea. Patient endorses feeling sick for a while and not eating appropriately Diarrhea episodes were postprandial associated with epigastric pain; alternating diarrhea with lose stools have were dark red in color and now greenish (as per patient) with no mucus.  Patient recently traveled to [facility] for 4 months total, returning 3 [place] ago to [place].   Over the past 2 to 3 days he has been having bilateral symmetric lower extremity pitting edema worse on the left leg than on the right, after the flight. No fever/chills, sore throat, palpitations, NV, dysuria, hematuria, new joint pain, FND, rash, trauma; denies any sick contact or exposure to animals (no cattle or home pets neither here or in [place]     GI Hx: Patient states his small volume watery diarrhea 3-4 episodes/day began 1.5 months ago while in [place]. Denies any other sick contacts. His cough preceded his GI symptoms. The diarrhea occurs both during day and night and is worsened with food but no evidence of blood in his stools. endorses occsional cramps. Denies any fever, night sweats, chills, but endorses 10 lb weight loss. No family hx of IBD or colon  cancer    ED vitals stable  >>> CT chest abdomen pelvis done in ED     Vital Signs Last 24 Hrs  T(C): 36.4 (26 Jun 2022 07:51), Max: 36.8 (25 Jun 2022 22:09)  T(F): 97.6 (26 Jun 2022 07:51), Max: 98.3 (25 Jun 2022 22:09)  HR: 84 (26 Jun 2022 07:51) (84 - 87)  BP: 140/64 (26 Jun 2022 07:51) (140/64 - 146/65)  BP(mean): --  RR: 18 (26 Jun 2022 07:51) (18 - 18)  SpO2: 99% (26 Jun 2022 07:51) (98% - 100%) (26 Jun 2022 07:41)        Prior EGD: none     Prior Colonoscopy: 2 years ago per patient - unsure of findings      PAST MEDICAL & SURGICAL HISTORY:  Diabetes mellitus      Hyperlipidemia      No significant past surgical history            FAMILY HISTORY:      Social History:  Tobacco: denies   Alcohol: denies   Drugs: denies    Home Medications:  albuterol 200 mcg inhalation capsule:  (26 Jun 2022 11:05)  aspirin 81 mg oral tablet: 1 tab(s) orally once a day (26 Jun 2022 11:06)        MEDICATIONS  (STANDING):  benzonatate 100 milliGRAM(s) Oral every 8 hours  cefTRIAXone   IVPB 1000 milliGRAM(s) IV Intermittent every 24 hours  guaifenesin/dextromethorphan Oral Liquid 10 milliLiter(s) Oral every 6 hours  insulin glargine Injectable (LANTUS) 8 Unit(s) SubCutaneous at bedtime  insulin lispro (ADMELOG) corrective regimen sliding scale   SubCutaneous three times a day before meals  lactated ringers. 500 milliLiter(s) (70 mL/Hr) IV Continuous <Continuous>  metroNIDAZOLE    Tablet 500 milliGRAM(s) Oral every 8 hours  pantoprazole    Tablet 40 milliGRAM(s) Oral before breakfast    MEDICATIONS  (PRN):  albuterol/ipratropium for Nebulization 3 milliLiter(s) Nebulizer every 6 hours PRN Bronchospasm  morphine  - Injectable 2 milliGRAM(s) IV Push every 6 hours PRN Severe Pain (7 - 10)  oxycodone    5 mG/acetaminophen 325 mG 1 Tablet(s) Oral every 6 hours PRN Moderate Pain (4 - 6)      Allergies  No Known Allergies            Physical Examination:  T(C): 36.1 (06-26-22 @ 21:16), Max: 36.1 (06-26-22 @ 21:16)  HR: 81 (06-26-22 @ 21:16) (81 - 87)  BP: 146/67 (06-26-22 @ 21:16) (146/67 - 156/69)  RR: 18 (06-26-22 @ 21:16) (18 - 18)  SpO2: 99% (06-26-22 @ 15:51) (99% - 99%)          GENERAL: AAOx3, no acute distress.  HEAD:  Atraumatic, Normocephalic  EYES: conjunctiva and sclera clear  NECK: Supple, no JVD or thyromegaly  CHEST/LUNG: reduced breath soudns b/l  HEART: Regular rate and rhythm; normal S1, S2, No murmurs.  ABDOMEN: Soft, nontender, nondistended; Bowel sounds present  NEUROLOGY: No asterixis or tremor.   SKIN: Intact, no jaundice        Data:                        10.9   5.77  )-----------( 334      ( 26 Jun 2022 11:47 )             32.9     Hgb Trend:  10.9  06-26-22 @ 11:47  11.9  06-26-22 @ 01:00      06-26    134<L>  |  102  |  19  ----------------------------<  170<H>  3.8   |  25  |  1.0    Ca    7.9<L>      26 Jun 2022 11:47  Mg     1.5     06-26    TPro  4.3<L>  /  Alb  2.2<L>  /  TBili  <0.2  /  DBili  x   /  AST  41  /  ALT  12  /  AlkPhos  75  06-26    Liver panel trend:  TBili <0.2   /   AST 41   /   ALT 12   /   AlkP 75   /   Tptn 4.3   /   Alb 2.2    /   DBili --      06-26  TBili <0.2   /   AST 51   /   ALT 14   /   AlkP 84   /   Tptn 4.7   /   Alb 2.4    /   DBili --      06-26              Radiology:

## 2022-06-27 NOTE — CONSULT NOTE ADULT - ASSESSMENT
ASSESSMENT  71-year-old male with a history of HLD and DM presenting for cough for the last 2 months found to have ileitis and extensive abdominal LAD    IMPRESSION  #Mesenteric lymphadenitis    CXR No radiographic evidence of acute cardiopulmonary disease.    CTAP Long segment ileitis with extensive mesenteric and retroperitoneal lymphadenopathy (some of which are necrotic) with stranding of mesentery.   Mesenteric lymphadenitis secondary to enteritis, lymphadenitis from inflammatory etiologies, and lymphoma are considerations, and further   evaluation recommended. 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    < from: CT Angio Chest PE Protocol w/ IV Cont (06.26.22 @ 02:44) >  Multiple bilateral pulmonary nodules measuring up to 3 mm in the left upper lobe. Per Fleischner Society 2017 guidelines, consider optional CT chest in 12 months in a high risk patient (emphysema).  Bibasilar nodular patchy groundglass opacities, may be infectious/inflammatory in etiology.  Enlarged left paratracheal lymph node measuring 1.2 cm, likely reactive.  #COVID19, non-severe satting well on RA  #Sepsis ruled out on admission       Creatinine, Serum: 0.9 (06-27-22 @ 05:36)    Weight (kg): 90.7 (06-25-22 @ 22:09)    RECOMMENDATIONS  This is an incomplete consult note. All final recommendations to follow after interview and examination of the patient. Please follow recommendations noted below.    If any questions, please call or send a message on [employer] Teams  Please continue to update ID with any pertinent new laboratory or radiographic findings  Spectra [id number]   ASSESSMENT  71-year-old male with a history of HLD and DM presenting for cough for the last 2 months found to have ileitis and extensive abdominal LAD    IMPRESSION  #Mesenteric lymphadenitis- broad differential- rule out infectious etiology (no fevers.. TB, fungal) vs rule out malignancy    + wt loss, diarrhea x 2 months    CXR No radiographic evidence of acute cardiopulmonary disease.    CTAP Long segment ileitis with extensive mesenteric and retroperitoneal lymphadenopathy (some of which are necrotic) with stranding of mesentery.   Mesenteric lymphadenitis secondary to enteritis, lymphadenitis from inflammatory etiologies, and lymphoma are considerations, and further   evaluation recommended. 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    < from: CT Angio Chest PE Protocol w/ IV Cont (06.26.22 @ 02:44) >  Multiple bilateral pulmonary nodules measuring up to 3 mm in the left upper lobe. Per Fleischner Society 2017 guidelines, consider optional CT chest in 12 months in a high risk patient (emphysema).  Bibasilar nodular patchy groundglass opacities, may be infectious/inflammatory in etiology.  Enlarged left paratracheal lymph node measuring 1.2 cm, likely reactive.  #COVID19, non-severe satting well on RA    vaccinated x3  #Sepsis ruled out on admission     Creatinine, Serum: 0.9 (06-27-22 @ 05:36)    Weight (kg): 90.7 (06-25-22 @ 22:09)    RECOMMENDATIONS  - Bebtelovimab infusion recommended   - AFB sputum x3 induced by respiratory  - PPD, quantiferon gold   - GI PCR  - Stool AFB   - Fungitell, Histoplasma urine Ag, LDH, HIV AB/Ag  - On Ceftriaxone/flagyl- ok to keep for now  - IR consult for biopsy- send histopath, G/S & CX, Fungal CX, AFB    If any questions, please call or send a message on [employer] Teams  Please continue to update ID with any pertinent new laboratory or radiographic findings  Spectra [id number]

## 2022-06-28 LAB
ALBUMIN SERPL ELPH-MCNC: 2.2 G/DL — LOW (ref 3.5–5.2)
ALP SERPL-CCNC: 77 U/L — SIGNIFICANT CHANGE UP (ref 30–115)
ALT FLD-CCNC: 13 U/L — SIGNIFICANT CHANGE UP (ref 0–41)
ANION GAP SERPL CALC-SCNC: 12 MMOL/L — SIGNIFICANT CHANGE UP (ref 7–14)
AST SERPL-CCNC: 55 U/L — HIGH (ref 0–41)
AUTO DIFF PNL BLD: NEGATIVE — SIGNIFICANT CHANGE UP
BASOPHILS # BLD AUTO: 0.05 K/UL — SIGNIFICANT CHANGE UP (ref 0–0.2)
BASOPHILS NFR BLD AUTO: 0.9 % — SIGNIFICANT CHANGE UP (ref 0–1)
BILIRUB SERPL-MCNC: <0.2 MG/DL — SIGNIFICANT CHANGE UP (ref 0.2–1.2)
BUN SERPL-MCNC: 11 MG/DL — SIGNIFICANT CHANGE UP (ref 10–20)
C-ANCA SER-ACNC: NEGATIVE — SIGNIFICANT CHANGE UP
CALCIUM SERPL-MCNC: 8.2 MG/DL — LOW (ref 8.5–10.1)
CHLORIDE SERPL-SCNC: 105 MMOL/L — SIGNIFICANT CHANGE UP (ref 98–110)
CO2 SERPL-SCNC: 21 MMOL/L — SIGNIFICANT CHANGE UP (ref 17–32)
CREAT SERPL-MCNC: 0.9 MG/DL — SIGNIFICANT CHANGE UP (ref 0.7–1.5)
EGFR: 91 ML/MIN/1.73M2 — SIGNIFICANT CHANGE UP
EOSINOPHIL # BLD AUTO: 0.08 K/UL — SIGNIFICANT CHANGE UP (ref 0–0.7)
EOSINOPHIL NFR BLD AUTO: 1.5 % — SIGNIFICANT CHANGE UP (ref 0–8)
GLUCOSE BLDC GLUCOMTR-MCNC: 115 MG/DL — HIGH (ref 70–99)
GLUCOSE BLDC GLUCOMTR-MCNC: 126 MG/DL — HIGH (ref 70–99)
GLUCOSE BLDC GLUCOMTR-MCNC: 81 MG/DL — SIGNIFICANT CHANGE UP (ref 70–99)
GLUCOSE BLDC GLUCOMTR-MCNC: 82 MG/DL — SIGNIFICANT CHANGE UP (ref 70–99)
GLUCOSE SERPL-MCNC: 79 MG/DL — SIGNIFICANT CHANGE UP (ref 70–99)
HCT VFR BLD CALC: 35.8 % — LOW (ref 42–52)
HGB BLD-MCNC: 11.6 G/DL — LOW (ref 14–18)
HIV 1+2 AB+HIV1 P24 AG SERPL QL IA: SIGNIFICANT CHANGE UP
IMM GRANULOCYTES NFR BLD AUTO: 0.9 % — HIGH (ref 0.1–0.3)
LDH SERPL L TO P-CCNC: 240 U/L — SIGNIFICANT CHANGE UP (ref 50–242)
LYMPHOCYTES # BLD AUTO: 2.12 K/UL — SIGNIFICANT CHANGE UP (ref 1.2–3.4)
LYMPHOCYTES # BLD AUTO: 40.2 % — SIGNIFICANT CHANGE UP (ref 20.5–51.1)
MAGNESIUM SERPL-MCNC: 1.4 MG/DL — LOW (ref 1.8–2.4)
MCHC RBC-ENTMCNC: 25.9 PG — LOW (ref 27–31)
MCHC RBC-ENTMCNC: 32.4 G/DL — SIGNIFICANT CHANGE UP (ref 32–37)
MCV RBC AUTO: 79.9 FL — LOW (ref 80–94)
MONOCYTES # BLD AUTO: 0.32 K/UL — SIGNIFICANT CHANGE UP (ref 0.1–0.6)
MONOCYTES NFR BLD AUTO: 6.1 % — SIGNIFICANT CHANGE UP (ref 1.7–9.3)
MPO AB + PR3 PNL SER: SIGNIFICANT CHANGE UP
NEUTROPHILS # BLD AUTO: 2.66 K/UL — SIGNIFICANT CHANGE UP (ref 1.4–6.5)
NEUTROPHILS NFR BLD AUTO: 50.4 % — SIGNIFICANT CHANGE UP (ref 42.2–75.2)
NRBC # BLD: 0 /100 WBCS — SIGNIFICANT CHANGE UP (ref 0–0)
P-ANCA SER-ACNC: NEGATIVE — SIGNIFICANT CHANGE UP
PLATELET # BLD AUTO: 280 K/UL — SIGNIFICANT CHANGE UP (ref 130–400)
POTASSIUM SERPL-MCNC: 4 MMOL/L — SIGNIFICANT CHANGE UP (ref 3.5–5)
POTASSIUM SERPL-SCNC: 4 MMOL/L — SIGNIFICANT CHANGE UP (ref 3.5–5)
PROT SERPL-MCNC: 4.4 G/DL — LOW (ref 6–8)
RBC # BLD: 4.48 M/UL — LOW (ref 4.7–6.1)
RBC # FLD: 16.4 % — HIGH (ref 11.5–14.5)
SODIUM SERPL-SCNC: 138 MMOL/L — SIGNIFICANT CHANGE UP (ref 135–146)
TROPONIN T SERPL-MCNC: 0.02 NG/ML — HIGH
WBC # BLD: 5.28 K/UL — SIGNIFICANT CHANGE UP (ref 4.8–10.8)
WBC # FLD AUTO: 5.28 K/UL — SIGNIFICANT CHANGE UP (ref 4.8–10.8)

## 2022-06-28 PROCEDURE — 99233 SBSQ HOSP IP/OBS HIGH 50: CPT

## 2022-06-28 PROCEDURE — 99447 NTRPROF PH1/NTRNET/EHR 11-20: CPT

## 2022-06-28 RX ORDER — NIFEDIPINE 30 MG
30 TABLET, EXTENDED RELEASE 24 HR ORAL DAILY
Refills: 0 | Status: DISCONTINUED | OUTPATIENT
Start: 2022-06-28 | End: 2022-07-01

## 2022-06-28 RX ORDER — MAGNESIUM SULFATE 500 MG/ML
2 VIAL (ML) INJECTION
Refills: 0 | Status: COMPLETED | OUTPATIENT
Start: 2022-06-28 | End: 2022-06-28

## 2022-06-28 RX ADMIN — Medication 10 MILLILITER(S): at 17:14

## 2022-06-28 RX ADMIN — Medication 10 MILLILITER(S): at 05:56

## 2022-06-28 RX ADMIN — Medication 100 MILLIGRAM(S): at 02:54

## 2022-06-28 RX ADMIN — Medication 500 MILLIGRAM(S): at 13:24

## 2022-06-28 RX ADMIN — INSULIN GLARGINE 8 UNIT(S): 100 INJECTION, SOLUTION SUBCUTANEOUS at 22:26

## 2022-06-28 RX ADMIN — Medication 500 MILLIGRAM(S): at 22:26

## 2022-06-28 RX ADMIN — Medication 25 GRAM(S): at 22:26

## 2022-06-28 RX ADMIN — PANTOPRAZOLE SODIUM 40 MILLIGRAM(S): 20 TABLET, DELAYED RELEASE ORAL at 06:19

## 2022-06-28 RX ADMIN — Medication 10 MILLILITER(S): at 00:26

## 2022-06-28 RX ADMIN — Medication 30 MILLIGRAM(S): at 10:02

## 2022-06-28 RX ADMIN — CEFTRIAXONE 100 MILLIGRAM(S): 500 INJECTION, POWDER, FOR SOLUTION INTRAMUSCULAR; INTRAVENOUS at 11:49

## 2022-06-28 RX ADMIN — Medication 10 MILLILITER(S): at 11:51

## 2022-06-28 RX ADMIN — Medication 100 MILLIGRAM(S): at 17:14

## 2022-06-28 RX ADMIN — Medication 25 GRAM(S): at 20:34

## 2022-06-28 RX ADMIN — Medication 500 MILLIGRAM(S): at 05:56

## 2022-06-28 RX ADMIN — Medication 100 MILLIGRAM(S): at 10:02

## 2022-06-28 NOTE — CONSULT NOTE ADULT - SUBJECTIVE AND OBJECTIVE BOX
INTERVENTIONAL RADIOLOGY CONSULT:     Procedure Requested: lymph node biopsy    HPI:  71-year-old male with a history of HLD and DM presenting for cough for the last 2 months. Patient describes a nonproductive cough that started 2 months ago and that has gradually worsened over time; there is no associated chest pain or SOB. Associated symptoms started 2 months ago include weight loss (unknown how much), decreased appetite, fatigue, abdominal pain and diarrhea. Patient endorses feeling sick for a while and not eating appropriately Diarrhea episodes were postprandial associated with epigastric pain; alternating diarrhea with lose stools have were dark red in color and now greenish (as per patient) with no mucus.  Patient recently traveled to [facility] for 4 months total, returning 3 days ago to [place].   Over the past 2 to 3 days he has been having bilateral symmetric lower extremity pitting edema worse on the left leg than on the right, after the flight. No fever/chills, sore throat, palpitations, NV, dysuria, hematuria, new joint pain, FND, rash, trauma; denies any sick contact or exposure to animals (no cattle or home pets neither here or in [place]     ED vitals stable  >>> CT chest abdomen pelvis done in ED     Vital Signs Last 24 Hrs  T(C): 36.4 (26 Jun 2022 07:51), Max: 36.8 (25 Jun 2022 22:09)  T(F): 97.6 (26 Jun 2022 07:51), Max: 98.3 (25 Jun 2022 22:09)  HR: 84 (26 Jun 2022 07:51) (84 - 87)  BP: 140/64 (26 Jun 2022 07:51) (140/64 - 146/65)  BP(mean): --  RR: 18 (26 Jun 2022 07:51) (18 - 18)  SpO2: 99% (26 Jun 2022 07:51) (98% - 100%) (26 Jun 2022 07:41)      PAST MEDICAL & SURGICAL HISTORY:  Diabetes mellitus      Hyperlipidemia      No significant past surgical history          MEDICATIONS  (STANDING):  benzonatate 100 milliGRAM(s) Oral every 8 hours  cefTRIAXone   IVPB 1000 milliGRAM(s) IV Intermittent every 24 hours  guaifenesin/dextromethorphan Oral Liquid 10 milliLiter(s) Oral every 6 hours  insulin glargine Injectable (LANTUS) 8 Unit(s) SubCutaneous at bedtime  insulin lispro (ADMELOG) corrective regimen sliding scale   SubCutaneous three times a day before meals  metroNIDAZOLE    Tablet 500 milliGRAM(s) Oral every 8 hours  pantoprazole    Tablet 40 milliGRAM(s) Oral before breakfast  PPD  5 Tuberculin Unit(s) Injectable 5 Unit(s) IntraDermal once    MEDICATIONS  (PRN):  albuterol/ipratropium for Nebulization 3 milliLiter(s) Nebulizer every 6 hours PRN Bronchospasm  morphine  - Injectable 2 milliGRAM(s) IV Push every 6 hours PRN Severe Pain (7 - 10)  oxycodone    5 mG/acetaminophen 325 mG 1 Tablet(s) Oral every 6 hours PRN Moderate Pain (4 - 6)      Allergies    No Known Allergies    Intolerances      Physical Exam:   Vital Signs Last 24 Hrs  T(C): 37.2 (28 Jun 2022 07:37), Max: 37.2 (28 Jun 2022 07:37)  T(F): 98.9 (28 Jun 2022 07:37), Max: 98.9 (28 Jun 2022 07:37)  HR: 75 (28 Jun 2022 07:37) (75 - 77)  BP: 160/68 (28 Jun 2022 07:37) (160/68 - 171/72)  BP(mean): --  RR: 18 (28 Jun 2022 07:37) (18 - 18)  SpO2: --        Labs:                         11.6   5.28  )-----------( 280      ( 28 Jun 2022 07:50 )             35.8     06-27    143  |  108  |  15  ----------------------------<  83  3.4<L>   |  23  |  0.9    Ca    8.0<L>      27 Jun 2022 05:36  Mg     1.5     06-27    TPro  4.4<L>  /  Alb  2.4<L>  /  TBili  0.2  /  DBili  x   /  AST  46<H>  /  ALT  12  /  AlkPhos  84  06-27        Pertinent labs:                      11.6   5.28  )-----------( 280      ( 28 Jun 2022 07:50 )             35.8       06-27    143  |  108  |  15  ----------------------------<  83  3.4<L>   |  23  |  0.9    Ca    8.0<L>      27 Jun 2022 05:36  Mg     1.5     06-27    TPro  4.4<L>  /  Alb  2.4<L>  /  TBili  0.2  /  DBili  x   /  AST  46<H>  /  ALT  12  /  AlkPhos  84  06-27      Radiology & Additional Studies:     IMPRESSION:    Long segment ileitis with extensive mesenteric and retroperitoneal   lymphadenopathy (some of which are necrotic) with stranding of mesentery.   Mesenteric lymphadenitis secondary to enteritis, lymphadenitis from   inflammatory etiologies, and lymphoma are considerations, and further   evaluation recommended.    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    --- End of Report ---    *** ADDENDUM***    Infectious etiologies include tuberculosis and fungal disease.    Spoke with Dr. De La Garza.    IMPRESSION:    No evidence of acute pulmonary embolus.    Multiple bilateral pulmonary nodules measuring up to 3 mm in the left   upper lobe. Per Fleischner Society 2017 guidelines, consider optional CT   chest in 12 months in a high risk patient (emphysema).    Bibasilar nodular patchy groundglass opacities, may be   infectious/inflammatory in etiology.    Enlarged left paratracheal lymph node measuring 1.2 cm, likely reactive.    --- End of Report ---      Radiology imaging reviewed.       ASSESSMENT/ PLAN:   71-year-old male with a history of HLD and DM presenting for cough for the last 2 months. Patient describes a nonproductive cough that started 2 months ago and that has gradually worsened over time; there is no associated chest pain or SOB. Associated symptoms started 2 months ago include weight loss (unknown how much), decreased appetite, fatigue, abdominal pain and diarrhea. CT confirms long segment ileitis with extensive mesenteric and retroperitoneal lymphadenopathy. Mesenteric lymphadenitis secondary to enteritis, lymphadenitis from inflammatory etiologies including tuberculosis and fungal disease. Consulted for image guided lymph node biopsy.  - imaging reviewed, no accessible lymph node to biopsy  - due to imaging findings and clinical presentation, likely TB or infectious etiology  - recommend full TB work up   - will follow up once QuantiFeron has resulted    Thank you for the courtesy of this consult, please call [phone] with any further questions.

## 2022-06-28 NOTE — CONSULT NOTE ADULT - NS ATTEND AMEND GEN_ALL_CORE FT
Must rule out infectious etiology such as TB  Will follow up once infectious workup has been completed.

## 2022-06-28 NOTE — PROGRESS NOTE ADULT - SUBJECTIVE AND OBJECTIVE BOX
ROSS DIETRICH  71y, Male  Allergy: No Known Allergies      LOS  2d    CHIEF COMPLAINT: Cough (28 Jun 2022 09:16)      INTERVAL EVENTS/HPI  - No acute events overnight  - T(F): , Max: 98.9 (06-28-22 @ 07:37) Afebrile   - Tolerating medication  - WBC Count: 5.28 (06-28-22 @ 07:50)  WBC Count: 4.77 (06-27-22 @ 05:36)     - Creatinine, Serum: 0.9 (06-28-22 @ 07:50)  Creatinine, Serum: 0.9 (06-27-22 @ 05:36)       ROS  ***    VITALS:  T(F): 98.9, Max: 98.9 (06-28-22 @ 07:37)  HR: 75  BP: 160/68  RR: 18Vital Signs Last 24 Hrs  T(C): 37.2 (28 Jun 2022 07:37), Max: 37.2 (28 Jun 2022 07:37)  T(F): 98.9 (28 Jun 2022 07:37), Max: 98.9 (28 Jun 2022 07:37)  HR: 75 (28 Jun 2022 07:37) (75 - 77)  BP: 160/68 (28 Jun 2022 07:37) (160/68 - 171/72)  BP(mean): --  RR: 18 (28 Jun 2022 07:37) (18 - 18)  SpO2: --    PHYSICAL EXAM:  ***    FH: Non-contributory  Social Hx: Non-contributory    TESTS & MEASUREMENTS:                        11.6   5.28  )-----------( 280      ( 28 Jun 2022 07:50 )             35.8     06-28    138  |  105  |  11  ----------------------------<  79  4.0   |  21  |  0.9    Ca    8.2<L>      28 Jun 2022 07:50  Mg     1.4     06-28    TPro  4.4<L>  /  Alb  2.2<L>  /  TBili  <0.2  /  DBili  x   /  AST  55<H>  /  ALT  13  /  AlkPhos  77  06-28      LIVER FUNCTIONS - ( 28 Jun 2022 07:50 )  Alb: 2.2 g/dL / Pro: 4.4 g/dL / ALK PHOS: 77 U/L / ALT: 13 U/L / AST: 55 U/L / GGT: x               Culture - Urine (collected 06-25-22 @ 22:57)  Source: Clean Catch Clean Catch (Midstream)  Final Report (06-27-22 @ 19:38):    <10,000 CFU/mL Normal Urogenital Cari        Lactate, Blood: 0.8 mmol/L (06-27-22 @ 05:36)  Lactate, Blood: 1.4 mmol/L (06-26-22 @ 11:47)  Lactate, Blood: 2.2 mmol/L (06-26-22 @ 01:00)  Blood Gas Venous - Lactate: 2.10 mmol/L (06-26-22 @ 00:59)      INFECTIOUS DISEASES TESTING  Procalcitonin, Serum: 0.10 (06-26-22 @ 16:48)  HIV-1/2 Combo Result: Nonreact (06-26-22 @ 05:00)  Rapid RVP Result: Detected (06-25-22 @ 22:58)  strept    INFLAMMATORY MARKERS  C-Reactive Protein, Serum: 5.6 mg/L (06-27-22 @ 05:36)  Sedimentation Rate, Erythrocyte: 5 mm/Hr (06-27-22 @ 05:36)      RADIOLOGY & ADDITIONAL TESTS:  I have personally reviewed the last available Chest xray  CXR  Xray Chest 1 View- PORTABLE-Urgent:   ACC: [id number] EXAM:  XR CHEST PORTABLE URGENT 1V                          PROCEDURE DATE:  06/25/2022          INTERPRETATION:  Clinical History / Reason for exam: Shortness of breath,   suspecting pneumothorax.    Comparison : None.    Technique/Positioning: Frontal view of the chest.    Findings:    Support devices: None.    Cardiac/mediastinum/hilum: Unremarkable.    Lung parenchyma/Pleura: No focal consolidation, pleural effusion or   pneumothorax.    Skeleton/soft tissues: Degenerative changes.    Impression:    No radiographic evidence of acute cardiopulmonary disease.        --- End of Report ---          REZA BLOOM MD; Resident Radiologist  This document has been electronically signed.  WON CHERRY MD; Attending Radiologist  This document has been electronically signed. Jun 26 2022 12:52AM (06-25-22 @ 23:42)      CT  CT Abdomen and Pelvis w/ IV Cont:   ACC: [id number] EXAM:  CT ABDOMEN AND PELVIS IC                          *** ADDENDUM***    Infectious etiologies include tuberculosis and fungal disease.    Spoke with Dr. De La Garza.    --- End of Report ---    *** END OF ADDENDUM***      PROCEDURE DATE:  06/26/2022          INTERPRETATION:  CLINICAL STATEMENT: Abdominal pain    TECHNIQUE: Contiguous axial CT images were obtained from the lower chest   to the pubic symphysis following administration of intravenous contrast.    Oral contrast was not administered.  Reformatted images in the coronal   and sagittal planes were acquired.    COMPARISON : None.      FINDINGS:    LOWER CHEST: Unremarkable.    HEPATOBILIARY: Unremarkable.    SPLEEN: Unremarkable.    PANCREAS: Unremarkable.    ADRENAL GLANDS: Unremarkable.    KIDNEYS: Symmetric renal enhancement. No hydronephrosis. 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 Nonobstructing 4 mm left renal calculus. Left subcentimeter   renal hypodensities too small to characterize.    ABDOMINOPELVIC NODES: Numerous enlarged mesenteric and retroperitoneal   lymph nodes, some of which are necrotic, measuring up to 1.4 cm (2-49)   with surrounding haziness of the mesenteric fat. Retroperitoneal lymph   nodes appear to focally compressed IVC.    PELVIC ORGANS: Unremarkable.    PERITONEUM/MESENTERY/BOWEL: Long segment abnormal right abdominal ileal   wall thickening with mucosal hyperenhancement; terminal ileum appears to   be spared (series 3, image [id number])    BONES/SOFT TISSUES: Degenerative changes of the spine with grade 1   anterolisthesis L5 on S1.    OTHER: Vascular calcifications.      IMPRESSION:    Long segment ileitis with extensive mesenteric and retroperitoneal   lymphadenopathy (some of which are necrotic) with stranding of mesentery.   Mesenteric lymphadenitis secondary to enteritis, lymphadenitis from   inflammatory etiologies, and lymphoma are considerations, and further   evaluation recommended.    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    --- End of Report ---    ***Please see the addendum at the top of this report. It may contain   additional important information or changes.****      REZA BLOOM MD; Resident Radiologist  This document has been electronically signed.  WON CHERRY MD; Attending Radiologist  This document has been electronically signed. Jun 26 2022  3:42AM  Addend:WON CHERRY MD; Attending Radiologist  This addendum was electronically signed on: Jun 26 2022  3:54AM. (06-26-22 @ 02:47)      CARDIOLOGY TESTING  12 Lead ECG:   Ventricular Rate 89 BPM    Atrial Rate 89 BPM    P-R Interval 174 ms    QRS Duration 80 ms    Q-T Interval 368 ms    QTC Calculation(Bazett) 447 ms    P Axis 71 degrees    R Axis 71 degrees    T Axis 66 degrees    Diagnosis Line Normal sinus rhythm  Low voltage QRS  Cannot rule out Anterior infarct , age undetermined  Abnormal ECG    Confirmed by Erickson Bowens ([id number]) on 6/26/2022 11:41:57 AM (06-26-22 @ 01:43)      MEDICATIONS  benzonatate 100 Oral every 8 hours  cefTRIAXone   IVPB 1000 IV Intermittent every 24 hours  guaifenesin/dextromethorphan Oral Liquid 10 Oral every 6 hours  insulin glargine Injectable (LANTUS) 8 SubCutaneous at bedtime  insulin lispro (ADMELOG) corrective regimen sliding scale  SubCutaneous three times a day before meals  metroNIDAZOLE    Tablet 500 Oral every 8 hours  NIFEdipine XL 30 Oral daily  pantoprazole    Tablet 40 Oral before breakfast  PPD  5 Tuberculin Unit(s) Injectable 5 IntraDermal once      WEIGHT  Weight (kg): 90.7 (06-25-22 @ 22:09)  Creatinine, Serum: 0.9 mg/dL (06-28-22 @ 07:50)      ANTIBIOTICS:  cefTRIAXone   IVPB 1000 milliGRAM(s) IV Intermittent every 24 hours  metroNIDAZOLE    Tablet 500 milliGRAM(s) Oral every 8 hours      All available historical records have been reviewed       ROSS DIETRICH  71y, Male  Allergy: No Known Allergies      LOS  2d    CHIEF COMPLAINT: Cough (28 Jun 2022 09:16)      INTERVAL EVENTS/HPI  - No acute events overnight  - T(F): , Max: 98.9 (06-28-22 @ 07:37) Afebrile   - Tolerating medication  - WBC Count: 5.28 (06-28-22 @ 07:50)  WBC Count: 4.77 (06-27-22 @ 05:36)     - Creatinine, Serum: 0.9 (06-28-22 @ 07:50)  Creatinine, Serum: 0.9 (06-27-22 @ 05:36)       ROS  General: Denies rigors, nightsweats  HEENT: Denies headache, rhinorrhea, sore throat, eye pain  CV: Denies CP, palpitations  PULM: Denies wheezing, hemoptysis  GI: Denies hematemesis, hematochezia, melena  : Denies discharge, hematuria  MSK: Denies arthralgias, myalgias  SKIN: Denies rash, lesions  NEURO: Denies paresthesias, weakness  PSYCH: Denies depression, anxiety     VITALS:  T(F): 98.9, Max: 98.9 (06-28-22 @ 07:37)  HR: 75  BP: 160/68  RR: 18Vital Signs Last 24 Hrs  T(C): 37.2 (28 Jun 2022 07:37), Max: 37.2 (28 Jun 2022 07:37)  T(F): 98.9 (28 Jun 2022 07:37), Max: 98.9 (28 Jun 2022 07:37)  HR: 75 (28 Jun 2022 07:37) (75 - 77)  BP: 160/68 (28 Jun 2022 07:37) (160/68 - 171/72)  BP(mean): --  RR: 18 (28 Jun 2022 07:37) (18 - 18)  SpO2: --    PHYSICAL EXAM:  Gen: NAD, resting in bed  HEENT: Normocephalic, atraumatic  Neck: supple, no lymphadenopathy  CV: Regular rate & regular rhythm  Lungs: decreased BS at bases, no fremitus  Abdomen: Soft, BS present  Ext: Warm, well perfused  Neuro: non focal, awake  Skin: no rash, no erythema  Lines: no phlebitis     FH: Non-contributory  Social Hx: Non-contributory    TESTS & MEASUREMENTS:                        11.6   5.28  )-----------( 280      ( 28 Jun 2022 07:50 )             35.8     06-28    138  |  105  |  11  ----------------------------<  79  4.0   |  21  |  0.9    Ca    8.2<L>      28 Jun 2022 07:50  Mg     1.4     06-28    TPro  4.4<L>  /  Alb  2.2<L>  /  TBili  <0.2  /  DBili  x   /  AST  55<H>  /  ALT  13  /  AlkPhos  77  06-28      LIVER FUNCTIONS - ( 28 Jun 2022 07:50 )  Alb: 2.2 g/dL / Pro: 4.4 g/dL / ALK PHOS: 77 U/L / ALT: 13 U/L / AST: 55 U/L / GGT: x               Culture - Urine (collected 06-25-22 @ 22:57)  Source: Clean Catch Clean Catch (Midstream)  Final Report (06-27-22 @ 19:38):    <10,000 CFU/mL Normal Urogenital Cari        Lactate, Blood: 0.8 mmol/L (06-27-22 @ 05:36)  Lactate, Blood: 1.4 mmol/L (06-26-22 @ 11:47)  Lactate, Blood: 2.2 mmol/L (06-26-22 @ 01:00)  Blood Gas Venous - Lactate: 2.10 mmol/L (06-26-22 @ 00:59)      INFECTIOUS DISEASES TESTING  Procalcitonin, Serum: 0.10 (06-26-22 @ 16:48)  HIV-1/2 Combo Result: Nonreact (06-26-22 @ 05:00)  Rapid RVP Result: Detected (06-25-22 @ 22:58)  strept    INFLAMMATORY MARKERS  C-Reactive Protein, Serum: 5.6 mg/L (06-27-22 @ 05:36)  Sedimentation Rate, Erythrocyte: 5 mm/Hr (06-27-22 @ 05:36)      RADIOLOGY & ADDITIONAL TESTS:  I have personally reviewed the last available Chest xray  CXR  Xray Chest 1 View- PORTABLE-Urgent:   ACC: [id number] EXAM:  XR CHEST PORTABLE URGENT 1V                          PROCEDURE DATE:  06/25/2022          INTERPRETATION:  Clinical History / Reason for exam: Shortness of breath,   suspecting pneumothorax.    Comparison : None.    Technique/Positioning: Frontal view of the chest.    Findings:    Support devices: None.    Cardiac/mediastinum/hilum: Unremarkable.    Lung parenchyma/Pleura: No focal consolidation, pleural effusion or   pneumothorax.    Skeleton/soft tissues: Degenerative changes.    Impression:    No radiographic evidence of acute cardiopulmonary disease.        --- End of Report ---          REZA BLOOM MD; Resident Radiologist  This document has been electronically signed.  WON CHERRY MD; Attending Radiologist  This document has been electronically signed. Jun 26 2022 12:52AM (06-25-22 @ 23:42)      CT  CT Abdomen and Pelvis w/ IV Cont:   ACC: [id number] EXAM:  CT ABDOMEN AND PELVIS IC                          *** ADDENDUM***    Infectious etiologies include tuberculosis and fungal disease.    Spoke with Dr. De La Garza.    --- End of Report ---    *** END OF ADDENDUM***      PROCEDURE DATE:  06/26/2022          INTERPRETATION:  CLINICAL STATEMENT: Abdominal pain    TECHNIQUE: Contiguous axial CT images were obtained from the lower chest   to the pubic symphysis following administration of intravenous contrast.    Oral contrast was not administered.  Reformatted images in the coronal   and sagittal planes were acquired.    COMPARISON : None.      FINDINGS:    LOWER CHEST: Unremarkable.    HEPATOBILIARY: Unremarkable.    SPLEEN: Unremarkable.    PANCREAS: Unremarkable.    ADRENAL GLANDS: Unremarkable.    KIDNEYS: Symmetric renal enhancement. No hydronephrosis. 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 Nonobstructing 4 mm left renal calculus. Left subcentimeter   renal hypodensities too small to characterize.    ABDOMINOPELVIC NODES: Numerous enlarged mesenteric and retroperitoneal   lymph nodes, some of which are necrotic, measuring up to 1.4 cm (2-49)   with surrounding haziness of the mesenteric fat. Retroperitoneal lymph   nodes appear to focally compressed IVC.    PELVIC ORGANS: Unremarkable.    PERITONEUM/MESENTERY/BOWEL: Long segment abnormal right abdominal ileal   wall thickening with mucosal hyperenhancement; terminal ileum appears to   be spared (series 3, image [id number])    BONES/SOFT TISSUES: Degenerative changes of the spine with grade 1   anterolisthesis L5 on S1.    OTHER: Vascular calcifications.      IMPRESSION:    Long segment ileitis with extensive mesenteric and retroperitoneal   lymphadenopathy (some of which are necrotic) with stranding of mesentery.   Mesenteric lymphadenitis secondary to enteritis, lymphadenitis from   inflammatory etiologies, and lymphoma are considerations, and further   evaluation recommended.    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    --- End of Report ---    ***Please see the addendum at the top of this report. It may contain   additional important information or changes.****      REZA BLOOM MD; Resident Radiologist  This document has been electronically signed.  WON CHERRY MD; Attending Radiologist  This document has been electronically signed. Jun 26 2022  3:42AM  Addend:WON CHERRY MD; Attending Radiologist  This addendum was electronically signed on: Jun 26 2022  3:54AM. (06-26-22 @ 02:47)      CARDIOLOGY TESTING  12 Lead ECG:   Ventricular Rate 89 BPM    Atrial Rate 89 BPM    P-R Interval 174 ms    QRS Duration 80 ms    Q-T Interval 368 ms    QTC Calculation(Bazett) 447 ms    P Axis 71 degrees    R Axis 71 degrees    T Axis 66 degrees    Diagnosis Line Normal sinus rhythm  Low voltage QRS  Cannot rule out Anterior infarct , age undetermined  Abnormal ECG    Confirmed by Erickson Bowens ([id number]) on 6/26/2022 11:41:57 AM (06-26-22 @ 01:43)      MEDICATIONS  benzonatate 100 Oral every 8 hours  cefTRIAXone   IVPB 1000 IV Intermittent every 24 hours  guaifenesin/dextromethorphan Oral Liquid 10 Oral every 6 hours  insulin glargine Injectable (LANTUS) 8 SubCutaneous at bedtime  insulin lispro (ADMELOG) corrective regimen sliding scale  SubCutaneous three times a day before meals  metroNIDAZOLE    Tablet 500 Oral every 8 hours  NIFEdipine XL 30 Oral daily  pantoprazole    Tablet 40 Oral before breakfast  PPD  5 Tuberculin Unit(s) Injectable 5 IntraDermal once      WEIGHT  Weight (kg): 90.7 (06-25-22 @ 22:09)  Creatinine, Serum: 0.9 mg/dL (06-28-22 @ 07:50)      ANTIBIOTICS:  cefTRIAXone   IVPB 1000 milliGRAM(s) IV Intermittent every 24 hours  metroNIDAZOLE    Tablet 500 milliGRAM(s) Oral every 8 hours      All available historical records have been reviewed

## 2022-06-28 NOTE — PROGRESS NOTE ADULT - SUBJECTIVE AND OBJECTIVE BOX
Patient is a 71y old  Male who presents with a chief complaint of Cough (27 Jun 2022 10:24)    Patient was seen and examined.  no new complaints.    PAST MEDICAL & SURGICAL HISTORY:  Diabetes mellitus  Hyperlipidemia    No significant past surgical history    Allergies  No Known Allergies    MEDICATIONS  (STANDING):  benzonatate 100 milliGRAM(s) Oral every 8 hours  cefTRIAXone   IVPB 1000 milliGRAM(s) IV Intermittent every 24 hours  guaifenesin/dextromethorphan Oral Liquid 10 milliLiter(s) Oral every 6 hours  insulin glargine Injectable (LANTUS) 8 Unit(s) SubCutaneous at bedtime  insulin lispro (ADMELOG) corrective regimen sliding scale   SubCutaneous three times a day before meals  metroNIDAZOLE    Tablet 500 milliGRAM(s) Oral every 8 hours  NIFEdipine XL 30 milliGRAM(s) Oral daily  pantoprazole    Tablet 40 milliGRAM(s) Oral before breakfast  PPD  5 Tuberculin Unit(s) Injectable 5 Unit(s) IntraDermal once    MEDICATIONS  (PRN):  albuterol/ipratropium for Nebulization 3 milliLiter(s) Nebulizer every 6 hours PRN Bronchospasm  morphine  - Injectable 2 milliGRAM(s) IV Push every 6 hours PRN Severe Pain (7 - 10)  oxycodone    5 mG/acetaminophen 325 mG 1 Tablet(s) Oral every 6 hours PRN Moderate Pain (4 - 6)    T(C): 36.2 (06-28-22 @ 16:30), Max: 37.2 (06-28-22 @ 07:37)  HR: 80 (06-28-22 @ 16:30) (75 - 80)  BP: 122/58 (06-28-22 @ 16:30) (122/58 - 160/68)  RR: 18 (06-28-22 @ 16:30) (18 - 18)  SpO2: --    O/E:  Awake, alert, not in distress.  HEENT: atraumatic, EOMI.  Chest: clear.  CVS: SIS2 +, no murmur.  P/A: Soft, BS+  CNS: awake, alert  Ext:  lower ext edema+  Skin: no rash, no ulcers.  All systems reviewed positive findings as above.                          11.6<L>  5.28  )-----------( 280      ( 28 Jun 2022 07:50 )             35.8<L>                        11.5<L>  4.77<L> )-----------( 350      ( 27 Jun 2022 05:36 )             35.0<L>  06-28    138  |  105  |  11  ----------------------------<  79  4.0   |  21  |  0.9  06-27    143  |  108  |  15  ----------------------------<  83  3.4<L>   |  23  |  0.9    Ca    8.2<L>      28 Jun 2022 07:50  Ca    8.0<L>      27 Jun 2022 05:36  Mg     1.4     06-28    TPro  4.4<L>  /  Alb  2.2<L>  /  TBili  <0.2  /  DBili  x   /  AST  55<H>  /  ALT  13  /  AlkPhos  77  06-28  TPro  4.4<L>  /  Alb  2.4<L>  /  TBili  0.2  /  DBili  x   /  AST  46<H>  /  ALT  12  /  AlkPhos  84  06-27

## 2022-06-28 NOTE — PROGRESS NOTE ADULT - SUBJECTIVE AND OBJECTIVE BOX
ROSS DIETRICH 71y Male  MRN#: [id number]   CODE STATUS:________    Hospital Day: 2d      SUBJECTIVE  71 yr old male with pmh DM & HLD was presented with 2 month hx of diarrhea, weight loss and cough.   ROS+ Dyspnea on exertion, cough, lower extremity swelling, diarrhea     OBJECTIVE  PAST MEDICAL & SURGICAL HISTORY  Diabetes mellitus    Hyperlipidemia    No significant past surgical history                                                ALLERGIES:  No Known Allergies                           HOME MEDICATIONS  Home Medications:  albuterol 200 mcg inhalation capsule:  (26 Jun 2022 11:05)  aspirin 81 mg oral tablet: 1 tab(s) orally once a day (26 Jun 2022 11:06)                           MEDICATIONS:  STANDING MEDICATIONS  benzonatate 100 milliGRAM(s) Oral every 8 hours  cefTRIAXone   IVPB 1000 milliGRAM(s) IV Intermittent every 24 hours  guaifenesin/dextromethorphan Oral Liquid 10 milliLiter(s) Oral every 6 hours  insulin glargine Injectable (LANTUS) 8 Unit(s) SubCutaneous at bedtime  insulin lispro (ADMELOG) corrective regimen sliding scale   SubCutaneous three times a day before meals  magnesium sulfate  IVPB 2 Gram(s) IV Intermittent every 2 hours  metroNIDAZOLE    Tablet 500 milliGRAM(s) Oral every 8 hours  NIFEdipine XL 30 milliGRAM(s) Oral daily  pantoprazole    Tablet 40 milliGRAM(s) Oral before breakfast  PPD  5 Tuberculin Unit(s) Injectable 5 Unit(s) IntraDermal once    PRN MEDICATIONS  albuterol/ipratropium for Nebulization 3 milliLiter(s) Nebulizer every 6 hours PRN  morphine  - Injectable 2 milliGRAM(s) IV Push every 6 hours PRN  oxycodone    5 mG/acetaminophen 325 mG 1 Tablet(s) Oral every 6 hours PRN                                            ------------------------------------------------------------  VITAL SIGNS: Last 24 Hours  T(C): 36.2 (28 Jun 2022 16:30), Max: 37.2 (28 Jun 2022 07:37)  T(F): 97.1 (28 Jun 2022 16:30), Max: 98.9 (28 Jun 2022 07:37)  HR: 80 (28 Jun 2022 16:30) (75 - 80)  BP: 122/58 (28 Jun 2022 16:30) (122/58 - 160/68)  BP(mean): --  RR: 18 (28 Jun 2022 16:30) (18 - 18)  SpO2: --      06-27-22 @ 07:01  -  06-28-22 @ 07:00  --------------------------------------------------------  IN: 480 mL / OUT: 0 mL / NET: 480 mL                                               LABS:                        11.6   5.28  )-----------( 280      ( 28 Jun 2022 07:50 )             35.8     06-28    138  |  105  |  11  ----------------------------<  79  4.0   |  21  |  0.9    Ca    8.2<L>      28 Jun 2022 07:50  Mg     1.4     06-28    TPro  4.4<L>  /  Alb  2.2<L>  /  TBili  <0.2  /  DBili  x   /  AST  55<H>  /  ALT  13  /  AlkPhos  77  06-28          Troponin T, Serum: 0.02 ng/mL *H* (06-28-22 @ 13:01)        Culture - Urine (collected 25 Jun 2022 22:57)  Source: Clean Catch Clean Catch (Midstream)  Final Report (27 Jun 2022 19:38):    <10,000 CFU/mL Normal Urogenital Cari        CARDIAC MARKERS ( 28 Jun 2022 13:01 )  x     / 0.02 ng/mL / x     / x     / x      CARDIAC MARKERS ( 27 Jun 2022 12:27 )  x     / 0.03 ng/mL / x     / x     / x      CARDIAC MARKERS ( 27 Jun 2022 05:36 )  x     / 0.04 ng/mL / x     / x     / x                                                  RADIOLOGY:        PHYSICAL EXAM:  GENERAL: NAD, lying in bed comfortably  HEAD:  Atraumatic, Normocephalic  EYES: EOMI, PERRLA, conjunctiva and sclera clear  ENT: Moist mucous membranes  NECK: Supple, No JVD  CHEST/LUNG: inspiratory wheezing bilaterally; No rales, rhonchi, or rubs. Unlabored respirations. Frequent cough present.   HEART: Regular rate and rhythm; No murmurs, rubs, or gallops  ABDOMEN: BSx4; Soft, nontender, nondistended  EXTREMITIES:  2+ Peripheral Pulses, brisk capillary refill. No clubbing, cyanosis, or edema  NERVOUS SYSTEM:  A&Ox3, no focal deficits   SKIN: No rashes or lesions                                         ASSESSMENT & PLAN    71 yr old male with pmh DM & HLD was presented with 2 month hx of diarrhea, weight loss and cough.   ROS+ Dyspnea on exertion, cough, lower extremity swelling, diarrhea     1. Necrotic Mesenteric Lymphadenitis   -patient not septic now nor on admission   -CT remarkable for long segment ileitis with mesenteric and retroperitoneal necrotic lymphadenopathy   retroperitoneal lymph nodes fully compressing IVC  -c/w ceftriaxone and flagyl  -GI on board and recommended labs ordered. Patient to remain on clear liq diet.   -ID on board and recommended labs ordered. Pt is s/p    Patient on r/o tb airborne precautions. - S/p Bebtelovimab infusion on 6/27  -1/3 afb collected   - IR consulted for LN biopsy- unable to access nodes for sampling   -ppd placed   -f/u QuantiFeron     2. COVID 19 infection   patient respirating ok on ra.   VACCINATED X3      3. DM2   A1C 5.6   FS MONITORING   Lantus 8 units +sliding correction     4. #HTN   -pt started on nifedipine   -bp better controlled.     5. Hypomagnesemia   -IV magnesium repletion                                                                                                     ----------------------------------------------------  # DVT prophylaxis     # GI prophylaxis   pantoprazole   # Diet   regular   # Activity Score (AM-PAC)    # Code status     # Disposition acute   pending GI and ID specimen results

## 2022-06-29 LAB
ALBUMIN SERPL ELPH-MCNC: 2.6 G/DL — LOW (ref 3.5–5.2)
ALP SERPL-CCNC: 86 U/L — SIGNIFICANT CHANGE UP (ref 30–115)
ALT FLD-CCNC: 17 U/L — SIGNIFICANT CHANGE UP (ref 0–41)
ANION GAP SERPL CALC-SCNC: 13 MMOL/L — SIGNIFICANT CHANGE UP (ref 7–14)
AST SERPL-CCNC: 62 U/L — HIGH (ref 0–41)
BASOPHILS # BLD AUTO: 0.1 K/UL — SIGNIFICANT CHANGE UP (ref 0–0.2)
BASOPHILS NFR BLD AUTO: 0.9 % — SIGNIFICANT CHANGE UP (ref 0–1)
BILIRUB SERPL-MCNC: <0.2 MG/DL — SIGNIFICANT CHANGE UP (ref 0.2–1.2)
BUN SERPL-MCNC: 10 MG/DL — SIGNIFICANT CHANGE UP (ref 10–20)
CALCIUM SERPL-MCNC: 8.3 MG/DL — LOW (ref 8.5–10.1)
CHLORIDE SERPL-SCNC: 105 MMOL/L — SIGNIFICANT CHANGE UP (ref 98–110)
CO2 SERPL-SCNC: 24 MMOL/L — SIGNIFICANT CHANGE UP (ref 17–32)
CREAT SERPL-MCNC: 0.9 MG/DL — SIGNIFICANT CHANGE UP (ref 0.7–1.5)
EGFR: 91 ML/MIN/1.73M2 — SIGNIFICANT CHANGE UP
EOSINOPHIL # BLD AUTO: 0.32 K/UL — SIGNIFICANT CHANGE UP (ref 0–0.7)
EOSINOPHIL NFR BLD AUTO: 2.8 % — SIGNIFICANT CHANGE UP (ref 0–8)
FUNGITELL: 73 PG/ML — SIGNIFICANT CHANGE UP
GAMMA INTERFERON BACKGROUND BLD IA-ACNC: 0.04 IU/ML — SIGNIFICANT CHANGE UP
GLUCOSE BLDC GLUCOMTR-MCNC: 100 MG/DL — HIGH (ref 70–99)
GLUCOSE BLDC GLUCOMTR-MCNC: 138 MG/DL — HIGH (ref 70–99)
GLUCOSE BLDC GLUCOMTR-MCNC: 256 MG/DL — HIGH (ref 70–99)
GLUCOSE BLDC GLUCOMTR-MCNC: 66 MG/DL — LOW (ref 70–99)
GLUCOSE BLDC GLUCOMTR-MCNC: 83 MG/DL — SIGNIFICANT CHANGE UP (ref 70–99)
GLUCOSE SERPL-MCNC: 85 MG/DL — SIGNIFICANT CHANGE UP (ref 70–99)
HCT VFR BLD CALC: 39.6 % — LOW (ref 42–52)
HGB BLD-MCNC: 12.9 G/DL — LOW (ref 14–18)
IMM GRANULOCYTES NFR BLD AUTO: 0.5 % — HIGH (ref 0.1–0.3)
LYMPHOCYTES # BLD AUTO: 57 % — HIGH (ref 20.5–51.1)
LYMPHOCYTES # BLD AUTO: 6.45 K/UL — HIGH (ref 1.2–3.4)
M TB IFN-G BLD-IMP: POSITIVE
M TB IFN-G CD4+ BCKGRND COR BLD-ACNC: 0.46 IU/ML — SIGNIFICANT CHANGE UP
M TB IFN-G CD4+CD8+ BCKGRND COR BLD-ACNC: 0.23 IU/ML — SIGNIFICANT CHANGE UP
MAGNESIUM SERPL-MCNC: 1.9 MG/DL — SIGNIFICANT CHANGE UP (ref 1.8–2.4)
MCHC RBC-ENTMCNC: 26.3 PG — LOW (ref 27–31)
MCHC RBC-ENTMCNC: 32.6 G/DL — SIGNIFICANT CHANGE UP (ref 32–37)
MCV RBC AUTO: 80.8 FL — SIGNIFICANT CHANGE UP (ref 80–94)
MONOCYTES # BLD AUTO: 0.42 K/UL — SIGNIFICANT CHANGE UP (ref 0.1–0.6)
MONOCYTES NFR BLD AUTO: 3.7 % — SIGNIFICANT CHANGE UP (ref 1.7–9.3)
NEUTROPHILS # BLD AUTO: 3.96 K/UL — SIGNIFICANT CHANGE UP (ref 1.4–6.5)
NEUTROPHILS NFR BLD AUTO: 35.1 % — LOW (ref 42.2–75.2)
NRBC # BLD: 0 /100 WBCS — SIGNIFICANT CHANGE UP (ref 0–0)
PLATELET # BLD AUTO: 403 K/UL — HIGH (ref 130–400)
POTASSIUM SERPL-MCNC: 3.3 MMOL/L — LOW (ref 3.5–5)
POTASSIUM SERPL-SCNC: 3.3 MMOL/L — LOW (ref 3.5–5)
PROT SERPL-MCNC: 5 G/DL — LOW (ref 6–8)
QUANT TB PLUS MITOGEN MINUS NIL: 6.37 IU/ML — SIGNIFICANT CHANGE UP
RBC # BLD: 4.9 M/UL — SIGNIFICANT CHANGE UP (ref 4.7–6.1)
RBC # FLD: 16.8 % — HIGH (ref 11.5–14.5)
SODIUM SERPL-SCNC: 142 MMOL/L — SIGNIFICANT CHANGE UP (ref 135–146)
WBC # BLD: 11.31 K/UL — HIGH (ref 4.8–10.8)
WBC # FLD AUTO: 11.31 K/UL — HIGH (ref 4.8–10.8)

## 2022-06-29 PROCEDURE — 99233 SBSQ HOSP IP/OBS HIGH 50: CPT

## 2022-06-29 RX ORDER — POTASSIUM CHLORIDE 20 MEQ
40 PACKET (EA) ORAL ONCE
Refills: 0 | Status: COMPLETED | OUTPATIENT
Start: 2022-06-29 | End: 2022-06-29

## 2022-06-29 RX ORDER — TUBERCULIN PURIFIED PROTEIN DERIVATIVE 5 [IU]/.1ML
5 INJECTION, SOLUTION INTRADERMAL ONCE
Refills: 0 | Status: DISCONTINUED | OUTPATIENT
Start: 2022-06-29 | End: 2022-07-01

## 2022-06-29 RX ORDER — POTASSIUM CHLORIDE 20 MEQ
10 PACKET (EA) ORAL DAILY
Refills: 0 | Status: DISCONTINUED | OUTPATIENT
Start: 2022-06-29 | End: 2022-06-29

## 2022-06-29 RX ADMIN — Medication 100 MILLIGRAM(S): at 11:26

## 2022-06-29 RX ADMIN — Medication 30 MILLIGRAM(S): at 06:19

## 2022-06-29 RX ADMIN — PANTOPRAZOLE SODIUM 40 MILLIGRAM(S): 20 TABLET, DELAYED RELEASE ORAL at 08:12

## 2022-06-29 RX ADMIN — Medication 500 MILLIGRAM(S): at 13:13

## 2022-06-29 RX ADMIN — Medication 500 MILLIGRAM(S): at 06:20

## 2022-06-29 RX ADMIN — Medication 10 MILLILITER(S): at 17:13

## 2022-06-29 RX ADMIN — Medication 500 MILLIGRAM(S): at 21:55

## 2022-06-29 RX ADMIN — CEFTRIAXONE 100 MILLIGRAM(S): 500 INJECTION, POWDER, FOR SOLUTION INTRAMUSCULAR; INTRAVENOUS at 11:27

## 2022-06-29 RX ADMIN — Medication 6: at 17:12

## 2022-06-29 RX ADMIN — Medication 40 MILLIEQUIVALENT(S): at 11:26

## 2022-06-29 RX ADMIN — Medication 100 MILLIGRAM(S): at 06:03

## 2022-06-29 RX ADMIN — Medication 10 MILLILITER(S): at 11:26

## 2022-06-29 RX ADMIN — Medication 100 MILLIGRAM(S): at 17:13

## 2022-06-29 RX ADMIN — Medication 10 MILLILITER(S): at 00:14

## 2022-06-29 NOTE — PROGRESS NOTE ADULT - SUBJECTIVE AND OBJECTIVE BOX
Pt seen and examined at bedside. No CP or SOB.          PAST MEDICAL & SURGICAL HISTORY:  Diabetes mellitus    Hyperlipidemia    No significant past surgical history        VITAL SIGNS (Last 24 hrs):  T(C): 36.3 (06-29-22 @ 15:52), Max: 36.7 (06-29-22 @ 07:52)  HR: 80 (06-29-22 @ 15:52) (71 - 80)  BP: 131/63 (06-29-22 @ 15:52) (122/58 - 154/70)  RR: 18 (06-29-22 @ 15:52) (18 - 18)  SpO2: 98% (06-29-22 @ 01:21) (98% - 98%)  Wt(kg): --  Daily     Daily     I&O's Summary      PHYSICAL EXAM:  GENERAL: NAD, well-developed  HEAD:  Atraumatic, Normocephalic  EYES: EOMI, PERRLA, conjunctiva and sclera clear  NECK: Supple, No JVD  CHEST/LUNG: Clear to auscultation bilaterally; No wheeze  HEART: Regular rate and rhythm; No murmurs, rubs, or gallops  ABDOMEN: Soft, Nontender, Nondistended; Bowel sounds present  EXTREMITIES:  2+ Peripheral Pulses, No clubbing, cyanosis, or edema  PSYCH: AAOx3  NEUROLOGY: non-focal  SKIN: No rashes or lesions    Labs Reviewed  Spoke to patient in regards to abnormal labs.    CBC Full  -  ( 29 Jun 2022 06:24 )  WBC Count : 11.31 K/uL  Hemoglobin : 12.9 g/dL  Hematocrit : 39.6 %  Platelet Count - Automated : 403 K/uL  Mean Cell Volume : 80.8 fL  Mean Cell Hemoglobin : 26.3 pg  Mean Cell Hemoglobin Concentration : 32.6 g/dL  Auto Neutrophil # : 3.96 K/uL  Auto Lymphocyte # : 6.45 K/uL  Auto Monocyte # : 0.42 K/uL  Auto Eosinophil # : 0.32 K/uL  Auto Basophil # : 0.10 K/uL  Auto Neutrophil % : 35.1 %  Auto Lymphocyte % : 57.0 %  Auto Monocyte % : 3.7 %  Auto Eosinophil % : 2.8 %  Auto Basophil % : 0.9 %    BMP:    06-29 @ 06:24    Blood Urea Nitrogen - 10  Calcium - 8.3  Carbond Dioxide - 24  Chloride - 105  Creatinine - 0.9  Glucose - 85  Potassium - 3.3  Sodium - 142      Hemoglobin A1c -     Urine Culture:  06-27 @ 23:03 Urine culture: --    Culture Results:   Testing in progress  Method Type: --  Organism: --  Organism Identification: --  Specimen Source: .Blood Blood  06-27 @ 05:36 Urine culture: --    Culture Results:   No growth to date.  Method Type: --  Organism: --  Organism Identification: --  Specimen Source: .Blood None  06-25 @ 22:57 Urine culture: --    Culture Results:   <10,000 CFU/mL Normal Urogenital Cari  Method Type: --  Organism: --  Organism Identification: --  Specimen Source: Clean Catch Clean Catch (Midstream)        COVID Labs  CRP:  5.6 (06-27-22)    Procalcitonin, Serum: 0.10 ng/mL (06-26-22 @ 16:48)    D-Dimer:    Ferritin, Serum: 47 ng/mL (06-27-22 @ 05:36)    Fungitell: 73 pg/mL (06-26-22 @ 16:48)    Fungitell: 73 pg/mL (06-26-22 @ 16:48)      no imaging     MEDICATIONS  (STANDING):  benzonatate 100 milliGRAM(s) Oral every 8 hours  cefTRIAXone   IVPB 1000 milliGRAM(s) IV Intermittent every 24 hours  guaifenesin/dextromethorphan Oral Liquid 10 milliLiter(s) Oral every 6 hours  insulin glargine Injectable (LANTUS) 8 Unit(s) SubCutaneous at bedtime  insulin lispro (ADMELOG) corrective regimen sliding scale   SubCutaneous three times a day before meals  metroNIDAZOLE    Tablet 500 milliGRAM(s) Oral every 8 hours  NIFEdipine XL 30 milliGRAM(s) Oral daily  pantoprazole    Tablet 40 milliGRAM(s) Oral before breakfast  PPD  5 Tuberculin Unit(s) Injectable 5 Unit(s) IntraDermal once  PPD  5 Tuberculin Unit(s) Injectable 5 Unit(s) IntraDermal once    MEDICATIONS  (PRN):  albuterol/ipratropium for Nebulization 3 milliLiter(s) Nebulizer every 6 hours PRN Bronchospasm  morphine  - Injectable 2 milliGRAM(s) IV Push every 6 hours PRN Severe Pain (7 - 10)  oxycodone    5 mG/acetaminophen 325 mG 1 Tablet(s) Oral every 6 hours PRN Moderate Pain (4 - 6)

## 2022-06-29 NOTE — PROGRESS NOTE ADULT - SUBJECTIVE AND OBJECTIVE BOX
ROSS DIETRICH 71y Male  MRN#: [id number]   CODE STATUS:________    Hospital Day: 3d    71 yr old male with pmh DM & HLD was presented with 2 month hx of diarrhea, weight loss and cough.   ROS+ Dyspnea on exertion, cough, lower extremity swelling, diarrhea.  Patient reports feeling better at this time.   Had a bm today and outstanding specimens were collected.     HOME MEDICATIONS  Home Medications:  albuterol 200 mcg inhalation capsule:  (26 Jun 2022 11:05)  aspirin 81 mg oral tablet: 1 tab(s) orally once a day (26 Jun 2022 11:06)                           MEDICATIONS:  STANDING MEDICATIONS  benzonatate 100 milliGRAM(s) Oral every 8 hours  cefTRIAXone   IVPB 1000 milliGRAM(s) IV Intermittent every 24 hours  guaifenesin/dextromethorphan Oral Liquid 10 milliLiter(s) Oral every 6 hours  insulin glargine Injectable (LANTUS) 8 Unit(s) SubCutaneous at bedtime  insulin lispro (ADMELOG) corrective regimen sliding scale   SubCutaneous three times a day before meals  metroNIDAZOLE    Tablet 500 milliGRAM(s) Oral every 8 hours  NIFEdipine XL 30 milliGRAM(s) Oral daily  pantoprazole    Tablet 40 milliGRAM(s) Oral before breakfast  PPD  5 Tuberculin Unit(s) Injectable 5 Unit(s) IntraDermal once  PPD  5 Tuberculin Unit(s) Injectable 5 Unit(s) IntraDermal once    PRN MEDICATIONS  albuterol/ipratropium for Nebulization 3 milliLiter(s) Nebulizer every 6 hours PRN  morphine  - Injectable 2 milliGRAM(s) IV Push every 6 hours PRN  oxycodone    5 mG/acetaminophen 325 mG 1 Tablet(s) Oral every 6 hours PRN                                            ------------------------------------------------------------  VITAL SIGNS: Last 24 Hours  T(C): 36.3 (29 Jun 2022 15:52), Max: 36.7 (29 Jun 2022 07:52)  T(F): 97.4 (29 Jun 2022 15:52), Max: 98.1 (29 Jun 2022 07:52)  HR: 80 (29 Jun 2022 15:52) (71 - 80)  BP: 131/63 (29 Jun 2022 15:52) (129/57 - 154/70)  BP(mean): --  RR: 18 (29 Jun 2022 15:52) (18 - 18)  SpO2: 98% (29 Jun 2022 01:21) (98% - 98%)                                               LABS:                        12.9   11.31 )-----------( 403      ( 29 Jun 2022 06:24 )             39.6     06-29    142  |  105  |  10  ----------------------------<  85  3.3<L>   |  24  |  0.9    Ca    8.3<L>      29 Jun 2022 06:24  Mg     1.9     06-29    TPro  5.0<L>  /  Alb  2.6<L>  /  TBili  <0.2  /  DBili  x   /  AST  62<H>  /  ALT  17  /  AlkPhos  86  06-29                Culture - Fungal, Blood (collected 27 Jun 2022 23:03)  Source: .Blood Blood  Preliminary Report (29 Jun 2022 06:42):    Testing in progress    Culture - Blood (collected 27 Jun 2022 05:36)  Source: .Blood None  Preliminary Report (29 Jun 2022 11:01):    No growth to date.        CARDIAC MARKERS ( 28 Jun 2022 13:01 )  x     / 0.02 ng/mL / x     / x     / x                                                  RADIOLOGY:        PHYSICAL EXAM:  GENERAL: NAD, lying in bed comfortably.   HEAD:  Atraumatic, Normocephalic  EYES: EOMI, PERRLA, conjunctiva and sclera clear  ENT: Moist mucous membranes  NECK: Supple, No JVD  CHEST/LUNG: Clear to auscultation bilaterally; No rales, rhonchi, wheezing, or rubs. Unlabored respirations occasional coughing is present.   HEART: Regular rate and rhythm; No murmurs, rubs, or gallops  ABDOMEN: BSx4; Soft, nontender, nondistended  EXTREMITIES:  2+ Peripheral Pulses, brisk capillary refill. No clubbing, cyanosis, 1-2+ edema L>R edema  NERVOUS SYSTEM:  A&Ox3, no focal deficits                                            ASSESSMENT & PLAN    71 yr old male with pmh DM & HLD was presented with 2 month hx of diarrhea, weight loss and cough.   ROS+ Dyspnea on exertion, cough, lower extremity swelling, diarrhea     1. Necrotic Mesenteric Lymphadenitis   -patient not septic now nor on admission   -CT remarkable for long segment ileitis with mesenteric and retroperitoneal necrotic lymphadenopathy   retroperitoneal lymph nodes fully compressing IVC  -c/w ceftriaxone and flagyl  -GI on board and recommended labs ordered. Patient to remain on clear liq diet. Patient with a bm today and remaining outstanding samples sent to lab.   -ID on board and recommended labs ordered. Pt is s/p  Bebtelovimab infusion on 6/27  TB percautions remain in place   -2/3 afb collected   - IR consulted for LN biopsy- unable to access nodes for sampling   -ppd placed   -f/u QuantiFeron     2. COVID 19 infection   patient respirating ok on ra.   VACCINATED X3      3. DM2   A1C 5.6   FS MONITORING   Lantus 8 units +sliding correction     4. #HTN   -c/wnifedipine   -bp better controlled.     5. Hypomagnesemia   -s/p magnesium repletion  mag is wnl

## 2022-06-30 DIAGNOSIS — K52.9 NONINFECTIVE GASTROENTERITIS AND COLITIS, UNSPECIFIED: ICD-10-CM

## 2022-06-30 LAB
ALBUMIN SERPL ELPH-MCNC: 2.2 G/DL — LOW (ref 3.5–5.2)
ALP SERPL-CCNC: 68 U/L — SIGNIFICANT CHANGE UP (ref 30–115)
ALT FLD-CCNC: 13 U/L — SIGNIFICANT CHANGE UP (ref 0–41)
ANION GAP SERPL CALC-SCNC: 8 MMOL/L — SIGNIFICANT CHANGE UP (ref 7–14)
AST SERPL-CCNC: 49 U/L — HIGH (ref 0–41)
BASOPHILS # BLD AUTO: 0.06 K/UL — SIGNIFICANT CHANGE UP (ref 0–0.2)
BASOPHILS NFR BLD AUTO: 0.9 % — SIGNIFICANT CHANGE UP (ref 0–1)
BILIRUB SERPL-MCNC: 0.2 MG/DL — SIGNIFICANT CHANGE UP (ref 0.2–1.2)
BUN SERPL-MCNC: 9 MG/DL — LOW (ref 10–20)
CALCIUM SERPL-MCNC: 7.8 MG/DL — LOW (ref 8.5–10.1)
CHLORIDE SERPL-SCNC: 107 MMOL/L — SIGNIFICANT CHANGE UP (ref 98–110)
CO2 SERPL-SCNC: 24 MMOL/L — SIGNIFICANT CHANGE UP (ref 17–32)
CREAT SERPL-MCNC: 0.8 MG/DL — SIGNIFICANT CHANGE UP (ref 0.7–1.5)
CULTURE RESULTS: SIGNIFICANT CHANGE UP
EGFR: 94 ML/MIN/1.73M2 — SIGNIFICANT CHANGE UP
EOSINOPHIL # BLD AUTO: 0.19 K/UL — SIGNIFICANT CHANGE UP (ref 0–0.7)
EOSINOPHIL NFR BLD AUTO: 2.9 % — SIGNIFICANT CHANGE UP (ref 0–8)
FUNGITELL: 49 PG/ML — SIGNIFICANT CHANGE UP
GALACTOMANNAN AG SERPL-ACNC: 0.05 INDEX — SIGNIFICANT CHANGE UP (ref 0–0.49)
GLUCOSE BLDC GLUCOMTR-MCNC: 101 MG/DL — HIGH (ref 70–99)
GLUCOSE BLDC GLUCOMTR-MCNC: 172 MG/DL — HIGH (ref 70–99)
GLUCOSE BLDC GLUCOMTR-MCNC: 271 MG/DL — HIGH (ref 70–99)
GLUCOSE BLDC GLUCOMTR-MCNC: 93 MG/DL — SIGNIFICANT CHANGE UP (ref 70–99)
GLUCOSE SERPL-MCNC: 114 MG/DL — HIGH (ref 70–99)
HCT VFR BLD CALC: 35.1 % — LOW (ref 42–52)
HGB BLD-MCNC: 11.6 G/DL — LOW (ref 14–18)
IMM GRANULOCYTES NFR BLD AUTO: 0.8 % — HIGH (ref 0.1–0.3)
INTERFERON GAMMA, BLOOD: 18 PG/ML — HIGH
LYMPHOCYTES # BLD AUTO: 2.65 K/UL — SIGNIFICANT CHANGE UP (ref 1.2–3.4)
LYMPHOCYTES # BLD AUTO: 40.8 % — SIGNIFICANT CHANGE UP (ref 20.5–51.1)
MAGNESIUM SERPL-MCNC: 1.6 MG/DL — LOW (ref 1.8–2.4)
MCHC RBC-ENTMCNC: 26.7 PG — LOW (ref 27–31)
MCHC RBC-ENTMCNC: 33 G/DL — SIGNIFICANT CHANGE UP (ref 32–37)
MCV RBC AUTO: 80.9 FL — SIGNIFICANT CHANGE UP (ref 80–94)
MONOCYTES # BLD AUTO: 0.39 K/UL — SIGNIFICANT CHANGE UP (ref 0.1–0.6)
MONOCYTES NFR BLD AUTO: 6 % — SIGNIFICANT CHANGE UP (ref 1.7–9.3)
NEUTROPHILS # BLD AUTO: 3.15 K/UL — SIGNIFICANT CHANGE UP (ref 1.4–6.5)
NEUTROPHILS NFR BLD AUTO: 48.6 % — SIGNIFICANT CHANGE UP (ref 42.2–75.2)
NIGHT BLUE STAIN TISS: SIGNIFICANT CHANGE UP
NIGHT BLUE STAIN TISS: SIGNIFICANT CHANGE UP
NRBC # BLD: 0 /100 WBCS — SIGNIFICANT CHANGE UP (ref 0–0)
PLATELET # BLD AUTO: 292 K/UL — SIGNIFICANT CHANGE UP (ref 130–400)
POTASSIUM SERPL-MCNC: 3.6 MMOL/L — SIGNIFICANT CHANGE UP (ref 3.5–5)
POTASSIUM SERPL-SCNC: 3.6 MMOL/L — SIGNIFICANT CHANGE UP (ref 3.5–5)
PROT SERPL-MCNC: 4 G/DL — LOW (ref 6–8)
RBC # BLD: 4.34 M/UL — LOW (ref 4.7–6.1)
RBC # FLD: 16.8 % — HIGH (ref 11.5–14.5)
SODIUM SERPL-SCNC: 139 MMOL/L — SIGNIFICANT CHANGE UP (ref 135–146)
SPECIMEN SOURCE: SIGNIFICANT CHANGE UP
WBC # BLD: 6.49 K/UL — SIGNIFICANT CHANGE UP (ref 4.8–10.8)
WBC # FLD AUTO: 6.49 K/UL — SIGNIFICANT CHANGE UP (ref 4.8–10.8)

## 2022-06-30 PROCEDURE — 99232 SBSQ HOSP IP/OBS MODERATE 35: CPT

## 2022-06-30 PROCEDURE — 99233 SBSQ HOSP IP/OBS HIGH 50: CPT

## 2022-06-30 RX ORDER — MAGNESIUM SULFATE 500 MG/ML
2 VIAL (ML) INJECTION ONCE
Refills: 0 | Status: COMPLETED | OUTPATIENT
Start: 2022-06-30 | End: 2022-06-30

## 2022-06-30 RX ORDER — ENOXAPARIN SODIUM 100 MG/ML
40 INJECTION SUBCUTANEOUS EVERY 24 HOURS
Refills: 0 | Status: DISCONTINUED | OUTPATIENT
Start: 2022-06-30 | End: 2022-07-01

## 2022-06-30 RX ORDER — SODIUM CHLORIDE 9 MG/ML
3 INJECTION INTRAMUSCULAR; INTRAVENOUS; SUBCUTANEOUS DAILY
Refills: 0 | Status: DISCONTINUED | OUTPATIENT
Start: 2022-06-30 | End: 2022-07-01

## 2022-06-30 RX ADMIN — Medication 10 MILLILITER(S): at 11:40

## 2022-06-30 RX ADMIN — Medication 2: at 11:41

## 2022-06-30 RX ADMIN — Medication 500 MILLIGRAM(S): at 05:10

## 2022-06-30 RX ADMIN — Medication 100 MILLIGRAM(S): at 17:45

## 2022-06-30 RX ADMIN — Medication 500 MILLIGRAM(S): at 13:08

## 2022-06-30 RX ADMIN — Medication 10 MILLILITER(S): at 05:10

## 2022-06-30 RX ADMIN — Medication 10 MILLILITER(S): at 23:08

## 2022-06-30 RX ADMIN — CEFTRIAXONE 100 MILLIGRAM(S): 500 INJECTION, POWDER, FOR SOLUTION INTRAMUSCULAR; INTRAVENOUS at 12:57

## 2022-06-30 RX ADMIN — Medication 10 MILLILITER(S): at 17:46

## 2022-06-30 RX ADMIN — Medication 30 MILLIGRAM(S): at 05:11

## 2022-06-30 RX ADMIN — INSULIN GLARGINE 8 UNIT(S): 100 INJECTION, SOLUTION SUBCUTANEOUS at 22:05

## 2022-06-30 RX ADMIN — Medication 6: at 17:47

## 2022-06-30 RX ADMIN — Medication 25 GRAM(S): at 22:05

## 2022-06-30 RX ADMIN — PANTOPRAZOLE SODIUM 40 MILLIGRAM(S): 20 TABLET, DELAYED RELEASE ORAL at 06:22

## 2022-06-30 RX ADMIN — ENOXAPARIN SODIUM 40 MILLIGRAM(S): 100 INJECTION SUBCUTANEOUS at 17:47

## 2022-06-30 RX ADMIN — Medication 10 MILLILITER(S): at 02:52

## 2022-06-30 RX ADMIN — Medication 100 MILLIGRAM(S): at 02:52

## 2022-06-30 RX ADMIN — Medication 100 MILLIGRAM(S): at 11:40

## 2022-06-30 NOTE — PROGRESS NOTE ADULT - SUBJECTIVE AND OBJECTIVE BOX
Gastroenterology progress note:     Patient is a 71y old  Male who presents with a chief complaint of Cough (29 Jun 2022 17:23)       Admitted on: 06-26-22    We are following the patient for:       Interval History:    No acute events overnight.   - Diet - tolerating diet  - last BM - 2 semi solid overnight  - Abdominal pain - none      PAST MEDICAL & SURGICAL HISTORY:  Diabetes mellitus      Hyperlipidemia      No significant past surgical history          MEDICATIONS  (STANDING):  benzonatate 100 milliGRAM(s) Oral every 8 hours  cefTRIAXone   IVPB 1000 milliGRAM(s) IV Intermittent every 24 hours  guaifenesin/dextromethorphan Oral Liquid 10 milliLiter(s) Oral every 6 hours  insulin glargine Injectable (LANTUS) 8 Unit(s) SubCutaneous at bedtime  insulin lispro (ADMELOG) corrective regimen sliding scale   SubCutaneous three times a day before meals  metroNIDAZOLE    Tablet 500 milliGRAM(s) Oral every 8 hours  NIFEdipine XL 30 milliGRAM(s) Oral daily  pantoprazole    Tablet 40 milliGRAM(s) Oral before breakfast  PPD  5 Tuberculin Unit(s) Injectable 5 Unit(s) IntraDermal once  PPD  5 Tuberculin Unit(s) Injectable 5 Unit(s) IntraDermal once    MEDICATIONS  (PRN):  albuterol/ipratropium for Nebulization 3 milliLiter(s) Nebulizer every 6 hours PRN Bronchospasm  morphine  - Injectable 2 milliGRAM(s) IV Push every 6 hours PRN Severe Pain (7 - 10)  oxycodone    5 mG/acetaminophen 325 mG 1 Tablet(s) Oral every 6 hours PRN Moderate Pain (4 - 6)      Allergies  No Known Allergies        Physical Examination:  T(C): 36 (06-30-22 @ 07:53), Max: 36.3 (06-29-22 @ 15:52)  HR: 76 (06-30-22 @ 07:53) (76 - 80)  BP: 158/69 (06-30-22 @ 07:53) (131/63 - 158/69)  RR: 18 (06-30-22 @ 07:53) (18 - 18)  SpO2: --        GENERAL: AAOx3, no acute distress.  HEAD:  Atraumatic, Normocephalic  EYES: conjunctiva and sclera clear  NECK: Supple, no JVD or thyromegaly  CHEST/LUNG: reduced breath sounds b/l  HEART: Regular rate and rhythm; normal S1, S2, No murmurs.  ABDOMEN: Soft, nontender, nondistended; Bowel sounds present  NEUROLOGY: No asterixis or tremor.   SKIN: Intact, no jaundice     Data:                        11.6   6.49  )-----------( 292      ( 30 Jun 2022 07:22 )             35.1     Hgb trend:  11.6  06-30-22 @ 07:22  12.9  06-29-22 @ 06:24  11.6  06-28-22 @ 07:50        06-30    139  |  107  |  9<L>  ----------------------------<  114<H>  3.6   |  24  |  0.8    Ca    7.8<L>      30 Jun 2022 07:22  Mg     1.6     06-30    TPro  4.0<L>  /  Alb  2.2<L>  /  TBili  0.2  /  DBili  x   /  AST  49<H>  /  ALT  13  /  AlkPhos  68  06-30    Liver panel trend:  TBili 0.2   /   AST 49   /   ALT 13   /   AlkP 68   /   Tptn 4.0   /   Alb 2.2    /   DBili --      06-30  TBili <0.2   /   AST 62   /   ALT 17   /   AlkP 86   /   Tptn 5.0   /   Alb 2.6    /   DBili --      06-29  TBili <0.2   /   AST 55   /   ALT 13   /   AlkP 77   /   Tptn 4.4   /   Alb 2.2    /   DBili --      06-28  TBili 0.2   /   AST 46   /   ALT 12   /   AlkP 84   /   Tptn 4.4   /   Alb 2.4    /   DBili --      06-27  TBili <0.2   /   AST 41   /   ALT 12   /   AlkP 75   /   Tptn 4.3   /   Alb 2.2    /   DBili --      06-26  TBili <0.2   /   AST 51   /   ALT 14   /   AlkP 84   /   Tptn 4.7   /   Alb 2.4    /   DBili --      06-26          GI PCR Panel, Stool (collected 29 Jun 2022 14:45)  Source: .Stool Feces  Final Report (30 Jun 2022 03:26):    Enteroaggregative E. coli (EAEC)    and    Enteropathogenic E. coli (EPEC)    and    Norovirus GI/GII    DETECTED by PCR    *******Please Note:*******    GI panel PCR evaluates for:    Campylobacter, Plesiomonas shigelloides, Salmonella,    Vibrio, Yersinia enterocolitica, Enteroaggregative    Escherichia coli (EAEC), Enteropathogenic E.coli (EPEC),    Enterotoxigenic E. coli (ETEC) lt/st, Shiga-like    toxin-producing E. coli (STEC) stx1/stx2,    Shigella/ Enteroinvasive E. coli (EIEC), Cryptosporidium,    Cyclospora cayetanensis, Entamoeba histolytica,    Giardia lamblia, Adenovirus F 40/41, Astrovirus,    Norovirus GI/GII, Rotavirus A, Sapovirus    Culture - Fungal, Blood (collected 27 Jun 2022 23:03)  Source: .Blood Blood  Preliminary Report (29 Jun 2022 06:42):    Testing in progress         Radiology:

## 2022-06-30 NOTE — PROGRESS NOTE ADULT - SUBJECTIVE AND OBJECTIVE BOX
Pt seen and examined at bedside. No CP or SOB.    PAST MEDICAL & SURGICAL HISTORY:  Diabetes mellitus    Hyperlipidemia    No significant past surgical history        VITAL SIGNS (Last 24 hrs):  T(C): 36 (06-30-22 @ 07:53), Max: 36 (06-30-22 @ 07:53)  HR: 76 (06-30-22 @ 07:53) (76 - 76)  BP: 142/68 (06-30-22 @ 08:44) (142/68 - 158/69)  RR: 18 (06-30-22 @ 07:53) (18 - 18)  SpO2: --  Wt(kg): --  Daily     Daily     I&O's Summary      PHYSICAL EXAM:  GENERAL: NAD, well-developed  HEAD:  Atraumatic, Normocephalic  EYES: EOMI, PERRLA, conjunctiva and sclera clear  NECK: Supple, No JVD  CHEST/LUNG: Clear to auscultation bilaterally; No wheeze  HEART: Regular rate and rhythm; No murmurs, rubs, or gallops  ABDOMEN: Soft, Nontender, Nondistended; Bowel sounds present  EXTREMITIES:  2+ Peripheral Pulses, No clubbing, cyanosis, or edema  PSYCH: AAOx3  NEUROLOGY: non-focal  SKIN: No rashes or lesions    Labs Reviewed  Spoke to patient in regards to abnormal labs.    CBC Full  -  ( 30 Jun 2022 07:22 )  WBC Count : 6.49 K/uL  Hemoglobin : 11.6 g/dL  Hematocrit : 35.1 %  Platelet Count - Automated : 292 K/uL  Mean Cell Volume : 80.9 fL  Mean Cell Hemoglobin : 26.7 pg  Mean Cell Hemoglobin Concentration : 33.0 g/dL  Auto Neutrophil # : 3.15 K/uL  Auto Lymphocyte # : 2.65 K/uL  Auto Monocyte # : 0.39 K/uL  Auto Eosinophil # : 0.19 K/uL  Auto Basophil # : 0.06 K/uL  Auto Neutrophil % : 48.6 %  Auto Lymphocyte % : 40.8 %  Auto Monocyte % : 6.0 %  Auto Eosinophil % : 2.9 %  Auto Basophil % : 0.9 %    BMP:    06-30 @ 07:22    Blood Urea Nitrogen - 9  Calcium - 7.8  Carbond Dioxide - 24  Chloride - 107  Creatinine - 0.8  Glucose - 114  Potassium - 3.6  Sodium - 139      Hemoglobin A1c -     Urine Culture:  06-29 @ 14:45 Urine culture: --    Culture Results:   Testing in progress  Method Type: --  Organism: --  Organism Identification: --  Specimen Source: .Stool Stool  06-29 @ 09:24 Urine culture: --    Culture Results: --  Method Type: --  Organism: --  Organism Identification: --  Specimen Source: .Sputum Sputum  06-27 @ 23:03 Urine culture: --    Culture Results:   Testing in progress  Method Type: --  Organism: --  Organism Identification: --  Specimen Source: .Blood Blood  06-27 @ 05:36 Urine culture: --    Culture Results:   No growth to date.  Method Type: --  Organism: --  Organism Identification: --  Specimen Source: .Blood None  06-25 @ 22:57 Urine culture: --    Culture Results:   <10,000 CFU/mL Normal Urogenital Cari  Method Type: --  Organism: --  Organism Identification: --  Specimen Source: Clean Catch Clean Catch (Midstream)    GI PCR Panel, Stool (06.29.22 @ 14:45)    Specimen Source: .Stool Feces    Culture Results:   Enteroaggregative E. coli (EAEC)  and  Enteropathogenic E. coli (EPEC)  and  Norovirus GI/GII  DETECTED by PCR  *******Please Note:*******  GI panel PCR evaluates for:  Campylobacter, Plesiomonas shigelloides, Salmonella,  Vibrio, Yersinia enterocolitica, Enteroaggregative  Escherichia coli (EAEC), Enteropathogenic E.coli (EPEC),  Enterotoxigenic E. coli (ETEC) lt/st, Shiga-like  toxin-producing E. coli (STEC) stx1/stx2,  Shigella/ Enteroinvasive E. coli (EIEC), Cryptosporidium,  Cyclospora cayetanensis, Entamoeba histolytica,  Giardia lamblia, Adenovirus F 40/41, Astrovirus,  Norovirus GI/GII, Rotavirus A, Sapovirus        COVID Labs  CRP:  5.6 (06-27-22)    Procalcitonin, Serum: 0.10 ng/mL (06-26-22 @ 16:48)  Culture - Acid Fast - Stool w/Smear . (06.29.22 @ 14:45)    Acid Fast Bacilli Smear:   No acid fast bacilli seen by fluorochrome stain      D-Dimer:    Ferritin, Serum: 47 ng/mL (06-27-22 @ 05:36)    Fungitell: 49 pg/mL (06-27-22 @ 23:03)  Fungitell: 73 pg/mL (06-26-22 @ 16:48)    Fungitell: 49 pg/mL (06-27-22 @ 23:03)  Fungitell: 73 pg/mL (06-26-22 @ 16:48)      Imaging reviewed independently and reviewed read        MEDICATIONS  (STANDING):  benzonatate 100 milliGRAM(s) Oral every 8 hours  enoxaparin Injectable 40 milliGRAM(s) SubCutaneous every 24 hours  guaifenesin/dextromethorphan Oral Liquid 10 milliLiter(s) Oral every 6 hours  insulin glargine Injectable (LANTUS) 8 Unit(s) SubCutaneous at bedtime  insulin lispro (ADMELOG) corrective regimen sliding scale   SubCutaneous three times a day before meals  NIFEdipine XL 30 milliGRAM(s) Oral daily  pantoprazole    Tablet 40 milliGRAM(s) Oral before breakfast  PPD  5 Tuberculin Unit(s) Injectable 5 Unit(s) IntraDermal once  PPD  5 Tuberculin Unit(s) Injectable 5 Unit(s) IntraDermal once    MEDICATIONS  (PRN):  albuterol/ipratropium for Nebulization 3 milliLiter(s) Nebulizer every 6 hours PRN Bronchospasm  morphine  - Injectable 2 milliGRAM(s) IV Push every 6 hours PRN Severe Pain (7 - 10)  oxycodone    5 mG/acetaminophen 325 mG 1 Tablet(s) Oral every 6 hours PRN Moderate Pain (4 - 6)

## 2022-06-30 NOTE — PROGRESS NOTE ADULT - SUBJECTIVE AND OBJECTIVE BOX
ROSS DIETRICH 71y Male  MRN#: [id number]   CODE STATUS:________      71 yr old male with pmh DM & HLD was presented with 2 month hx of diarrhea, weight loss and cough.   ROS+ Dyspnea on exertion, cough, lower extremity swelling, diarrhea.  Patient reports feeling better at this time. No diarrhea. Still has productive cough. Tolerating advanced diet.   Spoke with patient's niece who reports that her uncle had a similar admission 1 year prior at [facility] and had followed up with oncologist Albaro Ramos who possibly performed a tissue biopsy.                                                OBJECTIVE  PAST MEDICAL & SURGICAL HISTORY  Diabetes mellitus    Hyperlipidemia    No significant past surgical history                                                ALLERGIES:  No Known Allergies                           HOME MEDICATIONS  Home Medications:  albuterol 200 mcg inhalation capsule:  (26 Jun 2022 11:05)  aspirin 81 mg oral tablet: 1 tab(s) orally once a day (26 Jun 2022 11:06)                           MEDICATIONS:  STANDING MEDICATIONS  benzonatate 100 milliGRAM(s) Oral every 8 hours  enoxaparin Injectable 40 milliGRAM(s) SubCutaneous every 24 hours  guaifenesin/dextromethorphan Oral Liquid 10 milliLiter(s) Oral every 6 hours  insulin glargine Injectable (LANTUS) 8 Unit(s) SubCutaneous at bedtime  insulin lispro (ADMELOG) corrective regimen sliding scale   SubCutaneous three times a day before meals  magnesium sulfate  IVPB 2 Gram(s) IV Intermittent once  NIFEdipine XL 30 milliGRAM(s) Oral daily  pantoprazole    Tablet 40 milliGRAM(s) Oral before breakfast  PPD  5 Tuberculin Unit(s) Injectable 5 Unit(s) IntraDermal once  PPD  5 Tuberculin Unit(s) Injectable 5 Unit(s) IntraDermal once    PRN MEDICATIONS  albuterol/ipratropium for Nebulization 3 milliLiter(s) Nebulizer every 6 hours PRN  morphine  - Injectable 2 milliGRAM(s) IV Push every 6 hours PRN  oxycodone    5 mG/acetaminophen 325 mG 1 Tablet(s) Oral every 6 hours PRN                                            ------------------------------------------------------------  VITAL SIGNS: Last 24 Hours  T(C): 36.2 (30 Jun 2022 16:19), Max: 36.2 (30 Jun 2022 16:19)  T(F): 97.2 (30 Jun 2022 16:19), Max: 97.2 (30 Jun 2022 16:19)  HR: 91 (30 Jun 2022 16:19) (76 - 91)  BP: 126/57 (30 Jun 2022 16:19) (126/57 - 158/69)  BP(mean): --  RR: 19 (30 Jun 2022 16:19) (18 - 19)  SpO2: --                                               LABS:                        11.6   6.49  )-----------( 292      ( 30 Jun 2022 07:22 )             35.1     06-30    139  |  107  |  9<L>  ----------------------------<  114<H>  3.6   |  24  |  0.8    Ca    7.8<L>      30 Jun 2022 07:22  Mg     1.6     06-30    TPro  4.0<L>  /  Alb  2.2<L>  /  TBili  0.2  /  DBili  x   /  AST  49<H>  /  ALT  13  /  AlkPhos  68  06-30                GI PCR Panel, Stool (collected 29 Jun 2022 14:45)  Source: .Stool Feces  Final Report (30 Jun 2022 03:26):    Enteroaggregative E. coli (EAEC)    and    Enteropathogenic E. coli (EPEC)    and    Norovirus GI/GII    DETECTED by PCR    *******Please Note:*******    GI panel PCR evaluates for:    Campylobacter, Plesiomonas shigelloides, Salmonella,    Vibrio, Yersinia enterocolitica, Enteroaggregative    Escherichia coli (EAEC), Enteropathogenic E.coli (EPEC),    Enterotoxigenic E. coli (ETEC) lt/st, Shiga-like    toxin-producing E. coli (STEC) stx1/stx2,    Shigella/ Enteroinvasive E. coli (EIEC), Cryptosporidium,    Cyclospora cayetanensis, Entamoeba histolytica,    Giardia lamblia, Adenovirus F 40/41, Astrovirus,    Norovirus GI/GII, Rotavirus A, Sapovirus    Culture - Acid Fast - Stool w/Smear (collected 29 Jun 2022 14:45)  Source: .Stool Stool    Culture - Acid Fast - Sputum w/Smear (collected 29 Jun 2022 09:24)  Source: .Sputum Sputum    Culture - Fungal, Blood (collected 27 Jun 2022 23:03)  Source: .Blood Blood  Preliminary Report (29 Jun 2022 06:42):    Testing in progress                                                    RADIOLOGY:        PHYSICAL EXAM:  GENERAL: NAD, lying in bed comfortably- wife at the bedside   HEAD:  Atraumatic, Normocephalic  EYES: EOMI, PERRLA, conjunctiva and sclera clear  ENT: Moist mucous membranes  NECK: Supple, No JVD  CHEST/LUNG: mild wheeze on auscultation bilaterally; No rales, rhonchi, or rubs. Unlabored respirations however occasional hacking cough is present  HEART: Regular rate and rhythm; No murmurs, rubs, or gallops  ABDOMEN: BSx4; Soft, nontender, nondistended  EXTREMITIES:  2+ Peripheral Pulses, brisk capillary refill. No clubbing, cyanosis, or edema  NERVOUS SYSTEM:  A&Ox3, no focal deficits   SKIN: No rashes or lesions                                         ASSESSMENT & PLAN      71 yr old male with pmh DM & HLD was presented with 2 month hx of diarrhea, weight loss and cough.   ROS+ Dyspnea on exertion, cough, lower extremity swelling, diarrhea     1. Necrotic Mesenteric Lymphadenitis   -patient not septic now nor on admission   -CT remarkable for long segment ileitis with mesenteric and retroperitoneal necrotic lymphadenopathy   retroperitoneal lymph nodes fully compressing IVC  - d/c ceftriaxone and flagyl per ID   -GI following.    -ID on board and is requesting tissue biopsy. IR cannot access nodes. Will reach out to GI to request possible biopsy.  TB percautions remain in place   -2/3 afb scheduled to be collected via respiratory protocol (hypertonic nebulized saline, patient is npo from midnight until after collection)  -Stool sample remarkable for noro and ecoli -NTD per ID  -f/u QuantiFeron +    2. COVID 19 infection   patient respirating ok on ra.   VACCINATED X3  s/p bebtelovimab      3. DM2   A1C 5.6   FS MONITORING   Lantus 8 units +sliding correction     4. #HTN   -c/wnifedipine   -bp controlled.                                                                                                         ----------------------------------------------------  # DVT prophylaxis     # GI prophylaxis     # Diet     # Activity Score (AM-PAC)    # Code status     # Disposition                                                                              --------------------------------------------------------    # Jill

## 2022-06-30 NOTE — PROGRESS NOTE ADULT - SUBJECTIVE AND OBJECTIVE BOX
ROSS DIETRICH  71y, Male  Allergy: No Known Allergies      LOS  4d    CHIEF COMPLAINT: Cough (30 Jun 2022 12:12)      INTERVAL EVENTS/HPI  -  [id number]  - No acute events overnight- norovirus + , quantiferon +   - T(F): , Max: 97.4 (06-29-22 @ 15:52)  - Denies any worsening symptoms  - Tolerating medication  - WBC Count: 6.49 (06-30-22 @ 07:22)  WBC Count: 11.31 (06-29-22 @ 06:24)     - Creatinine, Serum: 0.8 (06-30-22 @ 07:22)  Creatinine, Serum: 0.9 (06-29-22 @ 06:24)       ROS  General: Denies rigors, nightsweats  HEENT: Denies headache, rhinorrhea, sore throat, eye pain  CV: Denies CP, palpitations  PULM: Denies wheezing, hemoptysis  GI: Denies hematemesis, hematochezia, melena  : Denies discharge, hematuria  MSK: Denies arthralgias, myalgias  SKIN: Denies rash, lesions  NEURO: Denies paresthesias, weakness  PSYCH: Denies depression, anxiety    VITALS:  T(F): 96.8, Max: 97.4 (06-29-22 @ 15:52)  HR: 76  BP: 142/68  RR: 18Vital Signs Last 24 Hrs  T(C): 36 (30 Jun 2022 07:53), Max: 36.3 (29 Jun 2022 15:52)  T(F): 96.8 (30 Jun 2022 07:53), Max: 97.4 (29 Jun 2022 15:52)  HR: 76 (30 Jun 2022 07:53) (76 - 80)  BP: 142/68 (30 Jun 2022 08:44) (131/63 - 158/69)  BP(mean): --  RR: 18 (30 Jun 2022 07:53) (18 - 18)  SpO2: --    PHYSICAL EXAM:  Gen: NAD, resting in bed  HEENT: Normocephalic, atraumatic  Neck: supple, no lymphadenopathy  CV: Regular rate & regular rhythm  Lungs: decreased BS at bases, no fremitus  Abdomen: Soft, BS present  Ext: Warm, well perfused  Neuro: non focal, awake  Skin: no rash, no erythema  Lines: no phlebitis    FH: Non-contributory  Social Hx: Non-contributory    TESTS & MEASUREMENTS:                        11.6   6.49  )-----------( 292      ( 30 Jun 2022 07:22 )             35.1     06-30    139  |  107  |  9<L>  ----------------------------<  114<H>  3.6   |  24  |  0.8    Ca    7.8<L>      30 Jun 2022 07:22  Mg     1.6     06-30    TPro  4.0<L>  /  Alb  2.2<L>  /  TBili  0.2  /  DBili  x   /  AST  49<H>  /  ALT  13  /  AlkPhos  68  06-30      LIVER FUNCTIONS - ( 30 Jun 2022 07:22 )  Alb: 2.2 g/dL / Pro: 4.0 g/dL / ALK PHOS: 68 U/L / ALT: 13 U/L / AST: 49 U/L / GGT: x               GI PCR Panel, Stool (collected 06-29-22 @ 14:45)  Source: .Stool Feces  Final Report (06-30-22 @ 03:26):    Enteroaggregative E. coli (EAEC)    and    Enteropathogenic E. coli (EPEC)    and    Norovirus GI/GII    DETECTED by PCR    *******Please Note:*******    GI panel PCR evaluates for:    Campylobacter, Plesiomonas shigelloides, Salmonella,    Vibrio, Yersinia enterocolitica, Enteroaggregative    Escherichia coli (EAEC), Enteropathogenic E.coli (EPEC),    Enterotoxigenic E. coli (ETEC) lt/st, Shiga-like    toxin-producing E. coli (STEC) stx1/stx2,    Shigella/ Enteroinvasive E. coli (EIEC), Cryptosporidium,    Cyclospora cayetanensis, Entamoeba histolytica,    Giardia lamblia, Adenovirus F 40/41, Astrovirus,    Norovirus GI/GII, Rotavirus A, Sapovirus    Culture - Acid Fast - Stool w/Smear (collected 06-29-22 @ 14:45)  Source: .Stool Stool    Culture - Acid Fast - Sputum w/Smear (collected 06-29-22 @ 09:24)  Source: .Sputum Sputum    Culture - Fungal, Blood (collected 06-27-22 @ 23:03)  Source: .Blood Blood  Preliminary Report (06-29-22 @ 06:42):    Testing in progress    Culture - Blood (collected 06-27-22 @ 05:36)  Source: .Blood None  Preliminary Report (06-29-22 @ 11:01):    No growth to date.    Culture - Urine (collected 06-25-22 @ 22:57)  Source: Clean Catch Clean Catch (Midstream)  Final Report (06-27-22 @ 19:38):    <10,000 CFU/mL Normal Urogenital Cari        Lactate, Blood: 0.8 mmol/L (06-27-22 @ 05:36)  Lactate, Blood: 1.4 mmol/L (06-26-22 @ 11:47)  Lactate, Blood: 2.2 mmol/L (06-26-22 @ 01:00)  Blood Gas Venous - Lactate: 2.10 mmol/L (06-26-22 @ 00:59)      INFECTIOUS DISEASES TESTING  Fungitell: 49 (06-27-22 @ 23:03)  HIV-1/2 Combo Result: Nonreact (06-27-22 @ 23:03)  Hepatitis C Virus Interpretation Result: Nonreact (06-27-22 @ 05:36)  Procalcitonin, Serum: 0.10 (06-26-22 @ 16:48)  Fungitell: 73 (06-26-22 @ 16:48)  HIV-1/2 Combo Result: Nonreact (06-26-22 @ 05:00)  Rapid RVP Result: Detected (06-25-22 @ 22:58)      INFLAMMATORY MARKERS  C-Reactive Protein, Serum: 5.6 mg/L (06-27-22 @ 05:36)  Sedimentation Rate, Erythrocyte: 5 mm/Hr (06-27-22 @ 05:36)      RADIOLOGY & ADDITIONAL TESTS:  I have personally reviewed the last available Chest xray  CXR      CT      CARDIOLOGY TESTING  12 Lead ECG:   Ventricular Rate 89 BPM    Atrial Rate 89 BPM    P-R Interval 174 ms    QRS Duration 80 ms    Q-T Interval 368 ms    QTC Calculation(Bazett) 447 ms    P Axis 71 degrees    R Axis 71 degrees    T Axis 66 degrees    Diagnosis Line Normal sinus rhythm  Low voltage QRS  Cannot rule out Anterior infarct , age undetermined  Abnormal ECG    Confirmed by Erickson Bowens ([id number]) on 6/26/2022 11:41:57 AM (06-26-22 @ 01:43)      MEDICATIONS  benzonatate 100 Oral every 8 hours  cefTRIAXone   IVPB 1000 IV Intermittent every 24 hours  guaifenesin/dextromethorphan Oral Liquid 10 Oral every 6 hours  insulin glargine Injectable (LANTUS) 8 SubCutaneous at bedtime  insulin lispro (ADMELOG) corrective regimen sliding scale  SubCutaneous three times a day before meals  metroNIDAZOLE    Tablet 500 Oral every 8 hours  NIFEdipine XL 30 Oral daily  pantoprazole    Tablet 40 Oral before breakfast  PPD  5 Tuberculin Unit(s) Injectable 5 IntraDermal once  PPD  5 Tuberculin Unit(s) Injectable 5 IntraDermal once      WEIGHT  Weight (kg): 90.7 (06-25-22 @ 22:09)  Creatinine, Serum: 0.8 mg/dL (06-30-22 @ 07:22)      ANTIBIOTICS:  cefTRIAXone   IVPB 1000 milliGRAM(s) IV Intermittent every 24 hours  metroNIDAZOLE    Tablet 500 milliGRAM(s) Oral every 8 hours      All available historical records have been reviewed

## 2022-07-01 ENCOUNTER — TRANSCRIPTION ENCOUNTER (OUTPATIENT)
Age: 72
End: 2022-07-01

## 2022-07-01 VITALS
TEMPERATURE: 97 F | SYSTOLIC BLOOD PRESSURE: 142 MMHG | HEART RATE: 85 BPM | RESPIRATION RATE: 18 BRPM | DIASTOLIC BLOOD PRESSURE: 65 MMHG

## 2022-07-01 LAB
ALBUMIN SERPL ELPH-MCNC: 2 G/DL — LOW (ref 3.5–5.2)
ALP SERPL-CCNC: 60 U/L — SIGNIFICANT CHANGE UP (ref 30–115)
ALT FLD-CCNC: 13 U/L — SIGNIFICANT CHANGE UP (ref 0–41)
ANION GAP SERPL CALC-SCNC: 5 MMOL/L — LOW (ref 7–14)
AST SERPL-CCNC: 46 U/L — HIGH (ref 0–41)
BASOPHILS # BLD AUTO: 0.06 K/UL — SIGNIFICANT CHANGE UP (ref 0–0.2)
BASOPHILS NFR BLD AUTO: 0.9 % — SIGNIFICANT CHANGE UP (ref 0–1)
BILIRUB SERPL-MCNC: 0.2 MG/DL — SIGNIFICANT CHANGE UP (ref 0.2–1.2)
BUN SERPL-MCNC: 9 MG/DL — LOW (ref 10–20)
CALCIUM SERPL-MCNC: 7.9 MG/DL — LOW (ref 8.5–10.1)
CHLORIDE SERPL-SCNC: 110 MMOL/L — SIGNIFICANT CHANGE UP (ref 98–110)
CO2 SERPL-SCNC: 28 MMOL/L — SIGNIFICANT CHANGE UP (ref 17–32)
CREAT SERPL-MCNC: 0.7 MG/DL — SIGNIFICANT CHANGE UP (ref 0.7–1.5)
CULTURE RESULTS: SIGNIFICANT CHANGE UP
EGFR: 98 ML/MIN/1.73M2 — SIGNIFICANT CHANGE UP
EOSINOPHIL # BLD AUTO: 0.2 K/UL — SIGNIFICANT CHANGE UP (ref 0–0.7)
EOSINOPHIL NFR BLD AUTO: 2.9 % — SIGNIFICANT CHANGE UP (ref 0–8)
GLUCOSE BLDC GLUCOMTR-MCNC: 161 MG/DL — HIGH (ref 70–99)
GLUCOSE BLDC GLUCOMTR-MCNC: 88 MG/DL — SIGNIFICANT CHANGE UP (ref 70–99)
GLUCOSE SERPL-MCNC: 103 MG/DL — HIGH (ref 70–99)
H CAPSUL AG SPEC-ACNC: SIGNIFICANT CHANGE UP
H CAPSUL AG UR IA-ACNC: SIGNIFICANT CHANGE UP NG/ML
H CAPSUL AG UR QL IA: SIGNIFICANT CHANGE UP
HCT VFR BLD CALC: 32.3 % — LOW (ref 42–52)
HGB BLD-MCNC: 10.5 G/DL — LOW (ref 14–18)
IMM GRANULOCYTES NFR BLD AUTO: 0.6 % — HIGH (ref 0.1–0.3)
LACTOFERRIN STL-MCNC: 139.97 — HIGH (ref 0–7.24)
LYMPHOCYTES # BLD AUTO: 2.86 K/UL — SIGNIFICANT CHANGE UP (ref 1.2–3.4)
LYMPHOCYTES # BLD AUTO: 41.4 % — SIGNIFICANT CHANGE UP (ref 20.5–51.1)
MAGNESIUM SERPL-MCNC: 1.7 MG/DL — LOW (ref 1.8–2.4)
MCHC RBC-ENTMCNC: 26.4 PG — LOW (ref 27–31)
MCHC RBC-ENTMCNC: 32.5 G/DL — SIGNIFICANT CHANGE UP (ref 32–37)
MCV RBC AUTO: 81.2 FL — SIGNIFICANT CHANGE UP (ref 80–94)
MONOCYTES # BLD AUTO: 0.35 K/UL — SIGNIFICANT CHANGE UP (ref 0.1–0.6)
MONOCYTES NFR BLD AUTO: 5.1 % — SIGNIFICANT CHANGE UP (ref 1.7–9.3)
NEUTROPHILS # BLD AUTO: 3.4 K/UL — SIGNIFICANT CHANGE UP (ref 1.4–6.5)
NEUTROPHILS NFR BLD AUTO: 49.1 % — SIGNIFICANT CHANGE UP (ref 42.2–75.2)
NRBC # BLD: 0 /100 WBCS — SIGNIFICANT CHANGE UP (ref 0–0)
PLATELET # BLD AUTO: 265 K/UL — SIGNIFICANT CHANGE UP (ref 130–400)
POTASSIUM SERPL-MCNC: 4 MMOL/L — SIGNIFICANT CHANGE UP (ref 3.5–5)
POTASSIUM SERPL-SCNC: 4 MMOL/L — SIGNIFICANT CHANGE UP (ref 3.5–5)
PROT SERPL-MCNC: 3.9 G/DL — LOW (ref 6–8)
RBC # BLD: 3.98 M/UL — LOW (ref 4.7–6.1)
RBC # FLD: 17.1 % — HIGH (ref 11.5–14.5)
SODIUM SERPL-SCNC: 143 MMOL/L — SIGNIFICANT CHANGE UP (ref 135–146)
SPECIMEN SOURCE: SIGNIFICANT CHANGE UP
WBC # BLD: 6.91 K/UL — SIGNIFICANT CHANGE UP (ref 4.8–10.8)
WBC # FLD AUTO: 6.91 K/UL — SIGNIFICANT CHANGE UP (ref 4.8–10.8)

## 2022-07-01 PROCEDURE — 99239 HOSP IP/OBS DSCHRG MGMT >30: CPT

## 2022-07-01 PROCEDURE — 71045 X-RAY EXAM CHEST 1 VIEW: CPT | Mod: 26

## 2022-07-01 PROCEDURE — 99223 1ST HOSP IP/OBS HIGH 75: CPT

## 2022-07-01 RX ORDER — IPRATROPIUM/ALBUTEROL SULFATE 18-103MCG
3 AEROSOL WITH ADAPTER (GRAM) INHALATION
Qty: 0 | Refills: 0 | DISCHARGE
Start: 2022-07-01

## 2022-07-01 RX ORDER — MAGNESIUM SULFATE 500 MG/ML
2 VIAL (ML) INJECTION ONCE
Refills: 0 | Status: COMPLETED | OUTPATIENT
Start: 2022-07-01 | End: 2022-07-01

## 2022-07-01 RX ORDER — LOSARTAN POTASSIUM 100 MG/1
25 TABLET, FILM COATED ORAL DAILY
Refills: 0 | Status: DISCONTINUED | OUTPATIENT
Start: 2022-07-01 | End: 2022-07-01

## 2022-07-01 RX ORDER — PIOGLITAZONE HYDROCHLORIDE 15 MG/1
30 TABLET ORAL DAILY
Refills: 0 | Status: DISCONTINUED | OUTPATIENT
Start: 2022-07-01 | End: 2022-07-01

## 2022-07-01 RX ORDER — LISINOPRIL 2.5 MG/1
5 TABLET ORAL DAILY
Refills: 0 | Status: DISCONTINUED | OUTPATIENT
Start: 2022-07-01 | End: 2022-07-01

## 2022-07-01 RX ORDER — NIFEDIPINE 30 MG
1 TABLET, EXTENDED RELEASE 24 HR ORAL
Qty: 30 | Refills: 0
Start: 2022-07-01 | End: 2022-07-30

## 2022-07-01 RX ADMIN — Medication 30 MILLIGRAM(S): at 05:22

## 2022-07-01 RX ADMIN — Medication 100 MILLIGRAM(S): at 02:09

## 2022-07-01 RX ADMIN — Medication 10 MILLILITER(S): at 05:22

## 2022-07-01 RX ADMIN — Medication 10 MILLILITER(S): at 11:52

## 2022-07-01 RX ADMIN — PANTOPRAZOLE SODIUM 40 MILLIGRAM(S): 20 TABLET, DELAYED RELEASE ORAL at 05:22

## 2022-07-01 RX ADMIN — Medication 100 MILLIGRAM(S): at 11:48

## 2022-07-01 RX ADMIN — SODIUM CHLORIDE 3 MILLILITER(S): 9 INJECTION INTRAMUSCULAR; INTRAVENOUS; SUBCUTANEOUS at 07:56

## 2022-07-01 RX ADMIN — Medication 2: at 11:52

## 2022-07-01 RX ADMIN — Medication 25 GRAM(S): at 12:39

## 2022-07-01 NOTE — DISCHARGE NOTE PROVIDER - HOSPITAL COURSE
71 yr old male with pmh DM & HLD was presented with 2 month hx of diarrhea, weight loss and cough.   ROS+ Dyspnea on exertion, cough, lower extremity swelling, diarrhea.  According to patient's niece who reports that her uncle had a similar admission 1 year prior at [facility] and had followed up with oncologist Albaro Ramos. Spoke with this oncologist who performed a tissue biopsy which was remarkable for low grade   lymphoma.                                              ASSESSMENT & PLAN  71 yr old male with pmh DM & HLD was presented with 2 month hx of diarrhea, weight loss and cough.   ROS+ Dyspnea on exertion, cough, lower extremity swelling, diarrhea     1. Necrotic Mesenteric Lymphadenitis   -CT remarkable for long segment ileitis with mesenteric and retroperitoneal necrotic lymphadenopathy   retroperitoneal lymph nodes fully compressing IVC  -Stool pcr remarkable for norovirus and Ecoli   -s/p ceftriaxone and flagyl per ID   -patient presented to [facility] for similar s/s 1 year prior. He had a biopsy by Dr. Albaro Ramos which was remarkable for low grade lymphoma. Lymphadenopathy stable   -2AFB negative, 3rd collected today and pending   -Patient is cleared for discharge to follow up with PCP for latent tb in the setting of + quantiferon   - Plan to follow up with oncologist in 1 week  - follow up with GI for colonoscopy OP      2. COVID 19 infection   s/p bebtelovimab  asymptomatic upon discharge    Patient is medically stable and clear for discharge.

## 2022-07-01 NOTE — PROGRESS NOTE ADULT - PROVIDER SPECIALTY LIST ADULT
Internal Medicine
Infectious Disease
Internal Medicine
Gastroenterology
Hospitalist

## 2022-07-01 NOTE — DISCHARGE NOTE PROVIDER - CARE PROVIDERS DIRECT ADDRESSES
,[email or web address]_Unknown

## 2022-07-01 NOTE — DISCHARGE NOTE PROVIDER - ATTENDING DISCHARGE PHYSICAL EXAMINATION:
Attending attestation  Attending DC note  Pt seen and examined at bedside. No cp or sob. Ambulatory Sat 98% on RA. follow up with primary care physician. AFB neg x2. primary care physician to follow up for TB preventative treatment   vitals, labs, exam stable  Hospital course as above.  Plan dw pt and agreed to plan  Medically cleared for DC. Med recc completed.  DW resident. Spent 32 mins on case

## 2022-07-01 NOTE — DISCHARGE NOTE NURSING/CASE MANAGEMENT/SOCIAL WORK - NSDCPEFALRISK_GEN_ALL_CORE
For information on Fall & Injury Prevention, visit: [email or web address] OR  [email or web address] OR  https://www.cdc.gov/steadi/patient.html

## 2022-07-01 NOTE — CHART NOTE - NSCHARTNOTEFT_GEN_A_CORE
Spoke with patient's oncologist.   This patient had been admitted to [facility] 1 year prior with similar findings. He had a biopsy by Dr. Albaro Ramos which was remarkable for low grade lymphoma. The mesenteric lymphadenopathy present on this admission is a chronic finding and is known to Dr. Ramos. Discussed these new findings with Dr. Cortez who feels that the patient can be discharged with outpatient follow up with pcp for latent TB. Will discuss this with patient and family now.

## 2022-07-01 NOTE — CONSULT NOTE ADULT - SUBJECTIVE AND OBJECTIVE BOX
GENERAL SURGERY CONSULT NOTE    Patient: ROSS DIETRICH , 71y ([birth year])Male   MRN: [id number]  Location: [address]  Visit: 06-26-22 Inpatient  Date: 07-01-22 @ 10:33    HPI:  "71-year-old male with a history of HLD and DM presenting for cough for the last 2 months. Patient describes a nonproductive cough that started 2 months ago and that has gradually worsened over time; there is no associated chest pain or SOB. Associated symptoms started 2 months ago include weight loss (unknown how much), decreased appetite, fatigue, abdominal pain and diarrhea. Patient endorses feeling sick for a while and not eating appropriately Diarrhea episodes were postprandial associated with epigastric pain; alternating diarrhea with lose stools have were dark red in color and now greenish (as per patient) with no mucus.  Patient recently traveled to [facility] for 4 months total, returning 3 days ago to [place].   Over the past 2 to 3 days he has been having bilateral symmetric lower extremity pitting edema worse on the left leg than on the right, after the flight. No fever/chills, sore throat, palpitations, NV, dysuria, hematuria, new joint pain, FND, rash, trauma; denies any sick contact or exposure to animals (no cattle or home pets neither here or in [place]     ED vitals stable  >>> CT chest abdomen pelvis done in ED "    SURGERY CONSULT 7/1  ***Full consult note to follow          Vital Signs Last 24 Hrs  T(C): 36.4 (26 Jun 2022 07:51), Max: 36.8 (25 Jun 2022 22:09)  T(F): 97.6 (26 Jun 2022 07:51), Max: 98.3 (25 Jun 2022 22:09)  HR: 84 (26 Jun 2022 07:51) (84 - 87)  BP: 140/64 (26 Jun 2022 07:51) (140/64 - 146/65)  BP(mean): --  RR: 18 (26 Jun 2022 07:51) (18 - 18)  SpO2: 99% (26 Jun 2022 07:51) (98% - 100%) (26 Jun 2022 07:41)      PAST MEDICAL & SURGICAL HISTORY:  Diabetes mellitus      Hyperlipidemia      No significant past surgical history          Home Medications:  albuterol 200 mcg inhalation capsule:  (26 Jun 2022 11:05)  aspirin 81 mg oral tablet: 1 tab(s) orally once a day (26 Jun 2022 11:06)        VITALS:  T(F): 96.8 (07-01-22 @ 08:19), Max: 97.2 (06-30-22 @ 16:19)  HR: 80 (07-01-22 @ 08:19) (80 - 91)  BP: 158/70 (07-01-22 @ 08:19) (126/57 - 158/70)  RR: 19 (07-01-22 @ 08:19) (19 - 19)  SpO2: --    PHYSICAL EXAM:  General: NAD, AAOx3, calm and cooperative  HEENT: NCAT, PAT, EOMI, Trachea ML, Neck supple  Cardiac: RRR S1, S2, no Murmurs, rubs or gallops  Respiratory: CTAB, normal respiratory effort, breath sounds equal BL, no wheeze, rhonchi or crackles  Abdomen: Soft, non-distended, non-tender, no rebound, no guarding. +BS.  Rectal: Good tone, +stool, no blood, no megan-anal masses/lesions, no fistulas, fissures, hemorrhoids  Musculoskeletal: Strength 5/5 BL UE/LE, ROM intact, compartments soft  Neuro: Sensation grossly intact and equal throughout, no focal deficits  Vascular: Pulses 2+ throughout, extremities well perfused  Skin: Warm/dry, normal color, no jaundice  Incision/wound: healing well, dressings in place, clean, dry and intact    MEDICATIONS  (STANDING):  benzonatate 100 milliGRAM(s) Oral every 8 hours  enoxaparin Injectable 40 milliGRAM(s) SubCutaneous every 24 hours  guaifenesin/dextromethorphan Oral Liquid 10 milliLiter(s) Oral every 6 hours  insulin glargine Injectable (LANTUS) 8 Unit(s) SubCutaneous at bedtime  insulin lispro (ADMELOG) corrective regimen sliding scale   SubCutaneous three times a day before meals  magnesium sulfate  IVPB 2 Gram(s) IV Intermittent once  NIFEdipine XL 30 milliGRAM(s) Oral daily  pantoprazole    Tablet 40 milliGRAM(s) Oral before breakfast  PPD  5 Tuberculin Unit(s) Injectable 5 Unit(s) IntraDermal once  PPD  5 Tuberculin Unit(s) Injectable 5 Unit(s) IntraDermal once  sodium chloride 0.9% for Nebulization 3 milliLiter(s) Nebulizer daily    MEDICATIONS  (PRN):  albuterol/ipratropium for Nebulization 3 milliLiter(s) Nebulizer every 6 hours PRN Bronchospasm  morphine  - Injectable 2 milliGRAM(s) IV Push every 6 hours PRN Severe Pain (7 - 10)  oxycodone    5 mG/acetaminophen 325 mG 1 Tablet(s) Oral every 6 hours PRN Moderate Pain (4 - 6)      LAB/STUDIES:                        10.5   6.91  )-----------( 265      ( 01 Jul 2022 07:47 )             32.3     07-01    143  |  110  |  9<L>  ----------------------------<  103<H>  4.0   |  28  |  0.7    Ca    7.9<L>      01 Jul 2022 07:47  Mg     1.7     07-01    TPro  3.9<L>  /  Alb  2.0<L>  /  TBili  0.2  /  DBili  x   /  AST  46<H>  /  ALT  13  /  AlkPhos  60  07-01      LIVER FUNCTIONS - ( 01 Jul 2022 07:47 )  Alb: 2.0 g/dL / Pro: 3.9 g/dL / ALK PHOS: 60 U/L / ALT: 13 U/L / AST: 46 U/L / GGT: x                         GI PCR Panel, Stool (collected 29 Jun 2022 14:45)  Source: .Stool Feces  Final Report (30 Jun 2022 03:26):    Enteroaggregative E. coli (EAEC)    and    Enteropathogenic E. coli (EPEC)    and    Norovirus GI/GII    DETECTED by PCR    *******Please Note:*******    GI panel PCR evaluates for:    Campylobacter, Plesiomonas shigelloides, Salmonella,    Vibrio, Yersinia enterocolitica, Enteroaggregative    Escherichia coli (EAEC), Enteropathogenic E.coli (EPEC),    Enterotoxigenic E. coli (ETEC) lt/st, Shiga-like    toxin-producing E. coli (STEC) stx1/stx2,    Shigella/ Enteroinvasive E. coli (EIEC), Cryptosporidium,    Cyclospora cayetanensis, Entamoeba histolytica,    Giardia lamblia, Adenovirus F 40/41, Astrovirus,    Norovirus GI/GII, Rotavirus A, Sapovirus    Culture - Acid Fast - Stool w/Smear (collected 29 Jun 2022 14:45)  Source: .Stool Stool    Culture - Acid Fast - Sputum w/Smear (collected 29 Jun 2022 09:24)  Source: .Sputum Sputum      IMAGING:  < from: CT Abdomen and Pelvis w/ IV Cont (06.26.22 @ 02:47) >  PROCEDURE DATE:  06/26/2022          INTERPRETATION:  CLINICAL STATEMENT: Abdominal pain    TECHNIQUE: Contiguous axial CT images were obtained from the lower chest   to the pubic symphysis following administration of intravenous contrast.    Oral contrast was not administered.  Reformatted images in the coronal   and sagittal planes were acquired.    COMPARISON : None.      FINDINGS:    LOWER CHEST: Unremarkable.    HEPATOBILIARY: Unremarkable.    SPLEEN: Unremarkable.    PANCREAS: Unremarkable.    ADRENAL GLANDS: Unremarkable.    KIDNEYS: Symmetric renal enhancement. No hydronephrosis. 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 Nonobstructing 4 mm left renal calculus. Left subcentimeter   renal hypodensities too small to characterize.    ABDOMINOPELVIC NODES: Numerous enlarged mesenteric and retroperitoneal   lymph nodes, some of which are necrotic, measuring up to 1.4 cm (2-49)   with surrounding haziness of the mesenteric fat. Retroperitoneal lymph   nodes appear to focally compressed IVC.    PELVIC ORGANS: Unremarkable.    PERITONEUM/MESENTERY/BOWEL: Long segment abnormal right abdominal ileal   wall thickening with mucosal hyperenhancement; terminal ileum appears to   be spared (series 3, image [id number])    BONES/SOFT TISSUES: Degenerative changes of the spine with grade 1   anterolisthesis L5 on S1.    OTHER: Vascular calcifications.      IMPRESSION:    Long segment ileitis with extensive mesenteric and retroperitoneal   lymphadenopathy (some of which are necrotic) with stranding of mesentery.   Mesenteric lymphadenitis secondary to enteritis, lymphadenitis from   inflammatory etiologies, and lymphoma are considerations, and further   evaluation recommended.    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    --- End of Report ---    < end of copied text >     GENERAL SURGERY CONSULT NOTE    Patient: ROSS DIETRICH , 71y ([birth year])Male   MRN: [id number]  Location: [place] [address] [address]  Visit: 06-26-22 Inpatient  Date: 07-01-22 @ 10:33    HPI:  "71-year-old male with a history of HLD and DM presenting for cough for the last 2 months. Patient describes a nonproductive cough that started 2 months ago and that has gradually worsened over time; there is no associated chest pain or SOB. Associated symptoms started 2 months ago include weight loss (unknown how much), decreased appetite, fatigue, abdominal pain and diarrhea. Patient endorses feeling sick for a while and not eating appropriately Diarrhea episodes were postprandial associated with epigastric pain; alternating diarrhea with lose stools have were dark red in color and now greenish (as per patient) with no mucus.  Patient recently traveled to [facility] for 4 months total, returning 3 days ago to [place].   Over the past 2 to 3 days he has been having bilateral symmetric lower extremity pitting edema worse on the left leg than on the right, after the flight. No fever/chills, sore throat, palpitations, NV, dysuria, hematuria, new joint pain, FND, rash, trauma; denies any sick contact or exposure to animals (no cattle or home pets neither here or in [place]     ED vitals stable  >>> CT chest abdomen pelvis done in ED "    SURGERY CONSULT 7/1  Update: Surgery recalled by primary, patient followed by outside oncologist for known history of lymphoma, imaging this admission reviewed and stable since prior.  Informed that patient no longer requires biopsy.          Vital Signs Last 24 Hrs  T(C): 36.4 (26 Jun 2022 07:51), Max: 36.8 (25 Jun 2022 22:09)  T(F): 97.6 (26 Jun 2022 07:51), Max: 98.3 (25 Jun 2022 22:09)  HR: 84 (26 Jun 2022 07:51) (84 - 87)  BP: 140/64 (26 Jun 2022 07:51) (140/64 - 146/65)  BP(mean): --  RR: 18 (26 Jun 2022 07:51) (18 - 18)  SpO2: 99% (26 Jun 2022 07:51) (98% - 100%) (26 Jun 2022 07:41)      PAST MEDICAL & SURGICAL HISTORY:  Diabetes mellitus      Hyperlipidemia      No significant past surgical history          Home Medications:  albuterol 200 mcg inhalation capsule:  (26 Jun 2022 11:05)  aspirin 81 mg oral tablet: 1 tab(s) orally once a day (26 Jun 2022 11:06)        VITALS:  T(F): 96.8 (07-01-22 @ 08:19), Max: 97.2 (06-30-22 @ 16:19)  HR: 80 (07-01-22 @ 08:19) (80 - 91)  BP: 158/70 (07-01-22 @ 08:19) (126/57 - 158/70)  RR: 19 (07-01-22 @ 08:19) (19 - 19)  SpO2: --    PHYSICAL EXAM:  General: NAD, AAOx3, calm and cooperative  HEENT: NCAT, PAT, EOMI, Trachea ML, Neck supple  Cardiac: RRR S1, S2, no Murmurs, rubs or gallops  Respiratory: CTAB, normal respiratory effort, breath sounds equal BL, no wheeze, rhonchi or crackles  Abdomen: Soft, non-distended, non-tender, no rebound, no guarding. +BS.  Rectal: Good tone, +stool, no blood, no megan-anal masses/lesions, no fistulas, fissures, hemorrhoids  Musculoskeletal: Strength 5/5 BL UE/LE, ROM intact, compartments soft  Neuro: Sensation grossly intact and equal throughout, no focal deficits  Vascular: Pulses 2+ throughout, extremities well perfused  Skin: Warm/dry, normal color, no jaundice  Incision/wound: healing well, dressings in place, clean, dry and intact    MEDICATIONS  (STANDING):  benzonatate 100 milliGRAM(s) Oral every 8 hours  enoxaparin Injectable 40 milliGRAM(s) SubCutaneous every 24 hours  guaifenesin/dextromethorphan Oral Liquid 10 milliLiter(s) Oral every 6 hours  insulin glargine Injectable (LANTUS) 8 Unit(s) SubCutaneous at bedtime  insulin lispro (ADMELOG) corrective regimen sliding scale   SubCutaneous three times a day before meals  magnesium sulfate  IVPB 2 Gram(s) IV Intermittent once  NIFEdipine XL 30 milliGRAM(s) Oral daily  pantoprazole    Tablet 40 milliGRAM(s) Oral before breakfast  PPD  5 Tuberculin Unit(s) Injectable 5 Unit(s) IntraDermal once  PPD  5 Tuberculin Unit(s) Injectable 5 Unit(s) IntraDermal once  sodium chloride 0.9% for Nebulization 3 milliLiter(s) Nebulizer daily    MEDICATIONS  (PRN):  albuterol/ipratropium for Nebulization 3 milliLiter(s) Nebulizer every 6 hours PRN Bronchospasm  morphine  - Injectable 2 milliGRAM(s) IV Push every 6 hours PRN Severe Pain (7 - 10)  oxycodone    5 mG/acetaminophen 325 mG 1 Tablet(s) Oral every 6 hours PRN Moderate Pain (4 - 6)      LAB/STUDIES:                        10.5   6.91  )-----------( 265      ( 01 Jul 2022 07:47 )             32.3     07-01    143  |  110  |  9<L>  ----------------------------<  103<H>  4.0   |  28  |  0.7    Ca    7.9<L>      01 Jul 2022 07:47  Mg     1.7     07-01    TPro  3.9<L>  /  Alb  2.0<L>  /  TBili  0.2  /  DBili  x   /  AST  46<H>  /  ALT  13  /  AlkPhos  60  07-01      LIVER FUNCTIONS - ( 01 Jul 2022 07:47 )  Alb: 2.0 g/dL / Pro: 3.9 g/dL / ALK PHOS: 60 U/L / ALT: 13 U/L / AST: 46 U/L / GGT: x                         GI PCR Panel, Stool (collected 29 Jun 2022 14:45)  Source: .Stool Feces  Final Report (30 Jun 2022 03:26):    Enteroaggregative E. coli (EAEC)    and    Enteropathogenic E. coli (EPEC)    and    Norovirus GI/GII    DETECTED by PCR    *******Please Note:*******    GI panel PCR evaluates for:    Campylobacter, Plesiomonas shigelloides, Salmonella,    Vibrio, Yersinia enterocolitica, Enteroaggregative    Escherichia coli (EAEC), Enteropathogenic E.coli (EPEC),    Enterotoxigenic E. coli (ETEC) lt/st, Shiga-like    toxin-producing E. coli (STEC) stx1/stx2,    Shigella/ Enteroinvasive E. coli (EIEC), Cryptosporidium,    Cyclospora cayetanensis, Entamoeba histolytica,    Giardia lamblia, Adenovirus F 40/41, Astrovirus,    Norovirus GI/GII, Rotavirus A, Sapovirus    Culture - Acid Fast - Stool w/Smear (collected 29 Jun 2022 14:45)  Source: .Stool Stool    Culture - Acid Fast - Sputum w/Smear (collected 29 Jun 2022 09:24)  Source: .Sputum Sputum      IMAGING:  < from: CT Abdomen and Pelvis w/ IV Cont (06.26.22 @ 02:47) >  PROCEDURE DATE:  06/26/2022          INTERPRETATION:  CLINICAL STATEMENT: Abdominal pain    TECHNIQUE: Contiguous axial CT images were obtained from the lower chest   to the pubic symphysis following administration of intravenous contrast.    Oral contrast was not administered.  Reformatted images in the coronal   and sagittal planes were acquired.    COMPARISON : None.      FINDINGS:    LOWER CHEST: Unremarkable.    HEPATOBILIARY: Unremarkable.    SPLEEN: Unremarkable.    PANCREAS: Unremarkable.    ADRENAL GLANDS: Unremarkable.    KIDNEYS: Symmetric renal enhancement. No hydronephrosis. 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 Nonobstructing 4 mm left renal calculus. Left subcentimeter   renal hypodensities too small to characterize.    ABDOMINOPELVIC NODES: Numerous enlarged mesenteric and retroperitoneal   lymph nodes, some of which are necrotic, measuring up to 1.4 cm (2-49)   with surrounding haziness of the mesenteric fat. Retroperitoneal lymph   nodes appear to focally compressed IVC.    PELVIC ORGANS: Unremarkable.    PERITONEUM/MESENTERY/BOWEL: Long segment abnormal right abdominal ileal   wall thickening with mucosal hyperenhancement; terminal ileum appears to   be spared (series 3, image [id number])    BONES/SOFT TISSUES: Degenerative changes of the spine with grade 1   anterolisthesis L5 on S1.    OTHER: Vascular calcifications.      IMPRESSION:    Long segment ileitis with extensive mesenteric and retroperitoneal   lymphadenopathy (some of which are necrotic) with stranding of mesentery.   Mesenteric lymphadenitis secondary to enteritis, lymphadenitis from   inflammatory etiologies, and lymphoma are considerations, and further   evaluation recommended.    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    --- End of Report ---    < end of copied text >

## 2022-07-01 NOTE — PROGRESS NOTE ADULT - ASSESSMENT
ASSESSMENT  71-year-old male with a history of HLD and DM presenting for cough for the last 2 months found to have ileitis and extensive abdominal LAD    IMPRESSION  #Mesenteric lymphadenitis- broad differential- rule out infectious etiology (no fevers.. TB (quantiferon + ), fungal) vs rule out malignancy    + Norovirus, EPEC, EAEC- usual colonizers    Fungitell: 73 (06-26-22 @ 16:48) Fungitell: 49 (06-27-22 @ 23:03)- suspect unremarkable     + wt loss, diarrhea x 2 months    CXR No radiographic evidence of acute cardiopulmonary disease.    CTAP Long segment ileitis with extensive mesenteric and retroperitoneal lymphadenopathy (some of which are necrotic) with stranding of mesentery.   Mesenteric lymphadenitis secondary to enteritis, lymphadenitis from inflammatory etiologies, and lymphoma are considerations, and further   evaluation recommended. 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    < from: CT Angio Chest PE Protocol w/ IV Cont (06.26.22 @ 02:44) >  Multiple bilateral pulmonary nodules measuring up to 3 mm in the left upper lobe. Per Fleischner Society 2017 guidelines, consider optional CT chest in 12 months in a high risk patient (emphysema).  Bibasilar nodular patchy groundglass opacities, may be infectious/inflammatory in etiology.  Enlarged left paratracheal lymph node measuring 1.2 cm, likely reactive.    HIV-1/2 Combo Result: Nonreact (06-26-22 @ 05:00)  #COVID19, non-severe satting well on RA    vaccinated x3  #Sepsis ruled out on admission     Creatinine, Serum: 0.9 (06-27-22 @ 05:36)    Weight (kg): 90.7 (06-25-22 @ 22:09)    RECOMMENDATIONS  - s/p 6/27 Bebtelovimab   - AFB sputum x3 induced by respiratory, so far 1 negative  - Send another AFB stool  - f/u Histoplasma urine Ag  - Ideally need tissue sampling   - D/C ABX  - IR consult for biopsy- no good window---, will continue to follow  - GI consult  - Consult Surgery- May need diagnostic lap     If any questions, please call or send a message on [employer] Teams  Please continue to update ID with any pertinent new laboratory or radiographic findings  Spectra [id number]  
ASSESSMENT  71-year-old male with a history of HLD and DM presenting for cough for the last 2 months found to have ileitis and extensive abdominal LAD    IMPRESSION  #Mesenteric lymphadenitis- broad differential- rule out infectious etiology (no fevers.. TB, fungal) vs rule out malignancy    + wt loss, diarrhea x 2 months    CXR No radiographic evidence of acute cardiopulmonary disease.    CTAP Long segment ileitis with extensive mesenteric and retroperitoneal lymphadenopathy (some of which are necrotic) with stranding of mesentery.   Mesenteric lymphadenitis secondary to enteritis, lymphadenitis from inflammatory etiologies, and lymphoma are considerations, and further   evaluation recommended. 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    < from: CT Angio Chest PE Protocol w/ IV Cont (06.26.22 @ 02:44) >  Multiple bilateral pulmonary nodules measuring up to 3 mm in the left upper lobe. Per Fleischner Society 2017 guidelines, consider optional CT chest in 12 months in a high risk patient (emphysema).  Bibasilar nodular patchy groundglass opacities, may be infectious/inflammatory in etiology.  Enlarged left paratracheal lymph node measuring 1.2 cm, likely reactive.    HIV-1/2 Combo Result: Nonreact (06-26-22 @ 05:00)  #COVID19, non-severe satting well on RA    vaccinated x3  #Sepsis ruled out on admission     Creatinine, Serum: 0.9 (06-27-22 @ 05:36)    Weight (kg): 90.7 (06-25-22 @ 22:09)    RECOMMENDATIONS  - s/p 6/27 Bebtelovimab   - AFB sputum x3 induced by respiratory  - PPD (call respiratory)- faster than quantiferon  - f/u quantiferon gold  Fungitell,   - GI PCR  - Stool AFB   - SEND Histoplasma urine Ag  - On Ceftriaxone/flagyl- ok to keep for now  - IR consult for biopsy- no good window---, will continue to follow    If any questions, please call or send a message on [employer] Teams  Please continue to update ID with any pertinent new laboratory or radiographic findings  Spectra [id number]  
ASSESSMENT  71-year-old male with a history of known lymphoma, HLD and DM presenting for cough for the last 2 months found to have ileitis and extensive abdominal LAD    IMPRESSION  #Mesenteric lymphadenitis- due to underlying known lymphoma (diagnosis was NOT known on admission and pt did not report on multiple interviews with --)  Intern spoke with hematologist who reported that pt has a history of biopsy diagnosed lymphoma and these necrotic LN are stable    + Norovirus, EPEC, EAEC- usual colonizers    Fungitell: 73 (06-26-22 @ 16:48) Fungitell: 49 (06-27-22 @ 23:03)- suspect unremarkable     + wt loss, diarrhea x 2 months    CXR No radiographic evidence of acute cardiopulmonary disease.    CTAP Long segment ileitis with extensive mesenteric and retroperitoneal lymphadenopathy (some of which are necrotic) with stranding of mesentery.   Mesenteric lymphadenitis secondary to enteritis, lymphadenitis from inflammatory etiologies, and lymphoma are considerations, and further   evaluation recommended. 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    < from: CT Angio Chest PE Protocol w/ IV Cont (06.26.22 @ 02:44) >  Multiple bilateral pulmonary nodules measuring up to 3 mm in the left upper lobe. Per Fleischner Society 2017 guidelines, consider optional CT chest in 12 months in a high risk patient (emphysema).  Bibasilar nodular patchy groundglass opacities, may be infectious/inflammatory in etiology.  Enlarged left paratracheal lymph node measuring 1.2 cm, likely reactive.    HIV-1/2 Combo Result: Nonreact (06-26-22 @ 05:00)  #LTBI + quantiferon gold  #COVID19, non-severe satting well on RA    vaccinated x3  #Sepsis ruled out on admission     Creatinine, Serum: 0.9 (06-27-22 @ 05:36)    Weight (kg): 90.7 (06-25-22 @ 22:09)    RECOMMENDATIONS  - s/p 6/27 Bebtelovimab   - f/u with PCP re: treatment of LTBI (or can f/u outpatient with our office -       [phone]       Fax [phone]   - F/u Hematologist  - f/u pending studies    If any questions, please call or send a message on [employer] Teams  Please continue to update ID with any pertinent new laboratory or radiographic findings  Zj[address]ief [id number]  
71-year-old male with a history of HLD and DM presenting for cough for the last 2 months. Patient describes a nonproductive cough that started 2 months ago and that has gradually worsened over time; there is no associated chest pain or SOB. Associated symptoms started 2 months ago include weight loss (unknown how much), decreased appetite, fatigue, abdominal pain and diarrhea. Patient states his small volume watery diarrhea 3-4 episodes/day began 1.5 months ago while in [place]. Denies any other sick contacts. His cough preceded his GI symptoms. The diarrhea occurs both during day and night and is worsened with food but no evidence of blood in his stools. endorses occsional cramps. Denies any fever, night sweats, chills, but endorses 10 lb weight loss. No family hx of IBD or colon  cancer    #Chronic Diarrhea w/ Long segment ileitis (sparing TI) w/ mesenteric lymphadenitis (partially necrotizing) on imaging - d/t TB complicated by multiple organisms including Noro-virus/EPEC/EAEC/COVID - resolving  - last cf 2 years ago per patient - unsure of findings  - Pt denies fever, night sweats. but endorses weight loss  - + QuANTIFERON  - GI PCR: norovirus, EPEC, EAEC  - FUngitell wnl  - sono: no ascites      Plan  - please treat TB per id recs  - advance diet   - outpatient colonoscopy  
71-year-old male with a history of HLD and DM presenting for cough for the last 2 months. Patient describes a nonproductive cough that started 2 months ago and that has gradually worsened over time; there is no associated chest pain or SOB. Associated symptoms started 2 months ago include weight loss (unknown how much), decreased appetite, fatigue, abdominal pain and diarrhea. Patient endorses feeling sick for a while and not eating appropriately Diarrhea episodes were postprandial associated with epigastric pain; alternating diarrhea with lose stools have were dark red in color and now greenish (as per patient) with no mucus.  Patient recently traveled to [facility] for 4 months total, [place] ago [place]   Over the past 2 to 3 days he has been having bilateral symmetric lower extremity pitting edema worse on the left leg than on the right, after the flight.    # Mesenteric lymphadenitis  # Sepsis ruled out on admission  -  Xray Chest 1 View- PORTABLE-Urgent (06.25.22 @ 23:42) >No radiographic evidence of acute cardiopulmonary disease.  -  CT Angio Chest PE Protocol w/ IV Cont (06.26.22 @ 02:44) >No evidence of acute pulmonary embolus. Multiple bilateral pulmonary nodules measuring up to 3 mm in the left upper lobe. Per Fleischner Society 2017 guidelines, consider optional CT chest in 12 months in a high risk patient (emphysema). Bibasilar nodular patchy groundglass opacities, may be  infectious/inflammatory in etiology. Enlarged left paratracheal lymph node measuring 1.2 cm, likely reactive.  - CT Abdomen and Pelvis w/ IV Cont (06.26.22 @ 02:47) >Long segment ileitis with extensive mesenteric and retroperitoneal lymphadenopathy (some of which are necrotic) with stranding of mesentery. Mesenteric lymphadenitis secondary to enteritis, lymphadenitis from inflammatory etiologies, and lymphoma are considerations, and further evaluation recommended. 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  - HIV-1/2 Combo Result: Nonreact: Nonreactive: (06.26.22 @ 05:00)  - LDH :240  - ID eval: rule out infectious etiology (no fevers.. TB, fungal) vs rule out malignancy  - F/u AFB sputum x3 induced by respiratory  - F/u PPD reading , quantiferon gold   - F/u GI PCR  - F/u Stool AFB   - F/u Fungitell, Histoplasma urine Ag  - On Ceftriaxone/flagyl  - IR consult for biopsy- imaging reviewed, no accessible lymph node to biopsy  - GI eval:  please send GI PCR,stool culture ova and parasites, fecal calprotectin and lactoferrin,  send fungitell, send IFN gamma release assay. if infectious workup is negative and once pulmonary status improves and pt has persistent diarrhea may consider colonoscopy w/ TI biopsies    # COVID19,   - vaccinated x3  - oxygen sat 97 on RA  - S/p Bebtelovimab infusion on 6/27    # Hypertension  - Start procardia  - monitor BP    # DM type2  - A1C with Estimated Average Glucose Result: 5.6% (06.27.22 @ 05:36)  - monitor FS  - c/w lantus, lispro    # Hypomagnesemia  - replete mg    # DVT prophylaxis    # Full code    #Pending: AFB sputum x3,  PPD, quantiferon gold , GI PCR, Stool AFB , Fungitell, Histoplasma urine Ag,  # Discussed plan of care with patient  # Disposition: Home when stable      
71-year-old male with a history of HLD and DM presenting for cough for the last 2 months. Patient describes a nonproductive cough that started 2 months ago and that has gradually worsened over time; there is no associated chest pain or SOB. Associated symptoms started 2 months ago include weight loss (unknown how much), decreased appetite, fatigue, abdominal pain and diarrhea. Patient endorses feeling sick for a while and not eating appropriately Diarrhea episodes were postprandial associated with epigastric pain; alternating diarrhea with lose stools have were dark red in color and now greenish (as per patient) with no mucus.  Patient recently traveled to [place] for 4 months total, returning 3 [place] ago to [place]   Over the past 2 to 3 days he has been having bilateral symmetric lower extremity pitting edema worse on the left leg than on the right, after the flight.    # Mesenteric lymphadenitis  # Sepsis ruled out on admission  -  Xray Chest 1 View- PORTABLE-Urgent (06.25.22 @ 23:42) >No radiographic evidence of acute cardiopulmonary disease.  -  CT Angio Chest PE Protocol w/ IV Cont (06.26.22 @ 02:44) >No evidence of acute pulmonary embolus. Multiple bilateral pulmonary nodules measuring up to 3 mm in the left upper lobe. Per Fleischner Society 2017 guidelines, consider optional CT chest in 12 months in a high risk patient (emphysema). Bibasilar nodular patchy groundglass opacities, may be  infectious/inflammatory in etiology. Enlarged left paratracheal lymph node measuring 1.2 cm, likely reactive.  - CT Abdomen and Pelvis w/ IV Cont (06.26.22 @ 02:47) >Long segment ileitis with extensive mesenteric and retroperitoneal lymphadenopathy (some of which are necrotic) with stranding of mesentery. Mesenteric lymphadenitis secondary to enteritis, lymphadenitis from inflammatory etiologies, and lymphoma are considerations, and further evaluation recommended. 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  - ID eval: rule out infectious etiology (no fevers.. TB, fungal) vs rule out malignancy  - Bebtelovimab infusion recommended   - AFB sputum x3 induced by respiratory  - PPD, quantiferon gold   - GI PCR  - Stool AFB   - Fungitell, Histoplasma urine Ag, LDH, HIV AB/Ag  - On Ceftriaxone/flagyl- ok to keep for now  - IR consult for biopsy- send histopath, G/S & CX, Fungal CX, AFB  - GI eval:  please send GI PCR,stool culture ova and parasites, fecal calprotectin and lactoferrin,  send fungitell, send IFN gamma release assay. if infectious workup is negative and once pulmonary status improves and pt has persistent diarrhea may consider colonoscopy w/ TI biopsies    # COVID19,   - vaccinated x3  - oxygen sat 97 on RA    # DM type2  - A1C with Estimated Average Glucose Result: 5.6% (06.27.22 @ 05:36)  - monitor FS  - c/w lantus, lispro    # Hypomagnesemia, Hypokalemia  - replete mg, K    # DVT prophylaxis    # Full code    #Pending: IR consult , AFB sputum x3,  PPD, quantiferon gold , GI PCR, Stool AFB , Fungitell, Histoplasma urine Ag, LDH, HIV AB/Ag  # Discussed plan of care with patient  # Disposition: Home when stable      
71-year-old male with a history of HLD and DM presenting for cough for the last 2 months. Patient describes a nonproductive cough that started 2 months ago and that has gradually worsened over time; there is no associated chest pain or SOB. Associated symptoms started 2 months ago include weight loss (unknown how much), decreased appetite, fatigue, abdominal pain and diarrhea. Patient endorses feeling sick for a while and not eating appropriately Diarrhea episodes were postprandial associated with epigastric pain; alternating diarrhea with lose stools have were dark red in color and now greenish (as per patient) with no mucus.  Patient recently traveled to [facility] for 4 months total, [place] ago [place]   Over the past 2 to 3 days he has been having bilateral symmetric lower extremity pitting edema worse on the left leg than on the right, after the flight.    # Mesenteric lymphadenitis  # Sepsis ruled out on admission  -  Xray Chest 1 View- PORTABLE-Urgent (06.25.22 @ 23:42) >No radiographic evidence of acute cardiopulmonary disease.  -  CT Angio Chest PE Protocol w/ IV Cont (06.26.22 @ 02:44) >No evidence of acute pulmonary embolus. Multiple bilateral pulmonary nodules measuring up to 3 mm in the left upper lobe. Per Fleischner Society 2017 guidelines, consider optional CT chest in 12 months in a high risk patient (emphysema). Bibasilar nodular patchy groundglass opacities, may be  infectious/inflammatory in etiology. Enlarged left paratracheal lymph node measuring 1.2 cm, likely reactive.  - CT Abdomen and Pelvis w/ IV Cont (06.26.22 @ 02:47) >Long segment ileitis with extensive mesenteric and retroperitoneal lymphadenopathy (some of which are necrotic) with stranding of mesentery. Mesenteric lymphadenitis secondary to enteritis, lymphadenitis from inflammatory etiologies, and lymphoma are considerations, and further evaluation recommended. 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  - HIV-1/2 Combo Result: Nonreact: Nonreactive: (06.26.22 @ 05:00)  - LDH :240  - ID eval: rule out infectious etiology (no fevers.. TB, fungal) vs rule out malignancy  - F/u AFB sputum x3 induced by respiratory  - F/u PPD reading , quantiferon gold   - F/u GI PCR  - F/u Stool AFB   - F/u Fungitell, Histoplasma urine Ag  - On Ceftriaxone/flagyl  - IR consult for biopsy- imaging reviewed, no accessible lymph node to biopsy  - GI eval:   culture ova and parasites, fecal calprotectin and lactoferrin,  send fungitell, send IFN gamma release assay. if infectious workup is negative and once pulmonary status improves and pt has persistent diarrhea may consider colonoscopy w/ TI biopsies  -GI pcr + noro virus and EAEC  - diet advanced today   - AFB neg x1 continue   - Quanta +   -ID appreciated surgery consult for exlap, dc abx    # COVID19,   - vaccinated x3  - oxygen sat 97 on RA  - S/p Bebtelovimab infusion on 6/27    # Hypertension  - continue procardia  - monitor BP    # DM type2  - A1C with Estimated Average Glucose Result: 5.6% (06.27.22 @ 05:36)  - monitor FS  - c/w lantus, lispro    # magnesium deff  - replete mg    #hypokalemia- 3.3- repleted     # DVT prophylaxis    # Full code    #Pending: AFB sputum x3, ,Stool AFB , Fungitell, Histoplasma urine Ag,surgery consult   # Discussed plan of care with patient  # Disposition: Home when stable, not ready for DC       
71-year-old male with a history of HLD and DM presenting for cough for the last 2 months. Patient describes a nonproductive cough that started 2 months ago and that has gradually worsened over time; there is no associated chest pain or SOB. Associated symptoms started 2 months ago include weight loss (unknown how much), decreased appetite, fatigue, abdominal pain and diarrhea. Patient endorses feeling sick for a while and not eating appropriately Diarrhea episodes were postprandial associated with epigastric pain; alternating diarrhea with lose stools have were dark red in color and now greenish (as per patient) with no mucus.  Patient recently traveled to [facility] for 4 months total, [place] ago [place]   Over the past 2 to 3 days he has been having bilateral symmetric lower extremity pitting edema worse on the left leg than on the right, after the flight.    # Mesenteric lymphadenitis  # Sepsis ruled out on admission  -  Xray Chest 1 View- PORTABLE-Urgent (06.25.22 @ 23:42) >No radiographic evidence of acute cardiopulmonary disease.  -  CT Angio Chest PE Protocol w/ IV Cont (06.26.22 @ 02:44) >No evidence of acute pulmonary embolus. Multiple bilateral pulmonary nodules measuring up to 3 mm in the left upper lobe. Per Fleischner Society 2017 guidelines, consider optional CT chest in 12 months in a high risk patient (emphysema). Bibasilar nodular patchy groundglass opacities, may be  infectious/inflammatory in etiology. Enlarged left paratracheal lymph node measuring 1.2 cm, likely reactive.  - CT Abdomen and Pelvis w/ IV Cont (06.26.22 @ 02:47) >Long segment ileitis with extensive mesenteric and retroperitoneal lymphadenopathy (some of which are necrotic) with stranding of mesentery. Mesenteric lymphadenitis secondary to enteritis, lymphadenitis from inflammatory etiologies, and lymphoma are considerations, and further evaluation recommended. 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  - HIV-1/2 Combo Result: Nonreact: Nonreactive: (06.26.22 @ 05:00)  - LDH :240  - ID eval: rule out infectious etiology (no fevers.. TB, fungal) vs rule out malignancy  - F/u AFB sputum x3 induced by respiratory  - F/u PPD reading , quantiferon gold   - F/u GI PCR  - F/u Stool AFB   - F/u Fungitell, Histoplasma urine Ag  - On Ceftriaxone/flagyl  - IR consult for biopsy- imaging reviewed, no accessible lymph node to biopsy  - GI eval:  please send GI PCR,stool culture ova and parasites, fecal calprotectin and lactoferrin,  send fungitell, send IFN gamma release assay. if infectious workup is negative and once pulmonary status improves and pt has persistent diarrhea may consider colonoscopy w/ TI biopsies  - diet advanced today   - follow up AFBs     # COVID19,   - vaccinated x3  - oxygen sat 97 on RA  - S/p Bebtelovimab infusion on 6/27    # Hypertension  - continue procardia  - monitor BP    # DM type2  - A1C with Estimated Average Glucose Result: 5.6% (06.27.22 @ 05:36)  - monitor FS  - c/w lantus, lispro    # Hypomagnesemia  - replete mg    #hypokalemia- 3.3- repleted     # DVT prophylaxis    # Full code    #Pending: AFB sputum x3,  PPD, quantiferon gold , GI PCR, Stool AFB , Fungitell, Histoplasma urine Ag,  # Discussed plan of care with patient  # Disposition: Home when stable, not ready for DC

## 2022-07-01 NOTE — DISCHARGE NOTE PROVIDER - NSDCCPCAREPLAN_GEN_ALL_CORE_FT
PRINCIPAL DISCHARGE DIAGNOSIS  Diagnosis: Pneumonia due to COVID-19 virus  Assessment and Plan of Treatment: You had a lung infection caused by COVID-19. You were treated with a monoclonal antibody called bebtelovimab and this condition improved. Continue to take the medication that we send to the pharmacy, and resume taking all the medications that you were taking before you came to the hospital. Follow up with Dr. Beebe for this.      SECONDARY DISCHARGE DIAGNOSES  Diagnosis: Mesenteric lymphadenitis  Assessment and Plan of Treatment: You had a ct scan which showed inflamation in your abdomen. You were seen by Dr. Ramos for this same reason last year and will return to his office in 7 days to follow up with him for this.    Diagnosis: Norovirus  Assessment and Plan of Treatment: You had alot of diarrhea when you first came to the hospital. We found that you had a viral stomach infection. This infection has improved by itself. PLease follow up with gastroenterology for colonoscopy.    Diagnosis: Latent tuberculosis  Assessment and Plan of Treatment: You were found to have latent tuberculosis on a lab test that we performed. It is important that you follow up with Dr. Iglesias regarding this in 4 days to initiate treatment of latent TB.     regular PRINCIPAL DISCHARGE DIAGNOSIS  Diagnosis: Pneumonia due to COVID-19 virus  Assessment and Plan of Treatment: You had a lung infection caused by COVID-19. You were treated with a monoclonal antibody called bebtelovimab and this condition improved. Continue to take the medication that we send to the pharmacy, and resume taking all the medications that you were taking before you came to the hospital. Follow up with Dr. Beebe for this.  Wheezing on exam- complete steroids as directed      SECONDARY DISCHARGE DIAGNOSES  Diagnosis: Mesenteric lymphadenitis  Assessment and Plan of Treatment: You had a ct scan which showed inflamation in your abdomen. You were seen by Dr. Ramos for this same reason last year and will return to his office in 7 days to follow up with him for this.    Diagnosis: Norovirus  Assessment and Plan of Treatment: You had alot of diarrhea when you first came to the hospital. We found that you had a viral stomach infection. This infection has improved by itself. PLease follow up with gastroenterology for colonoscopy.    Diagnosis: Latent tuberculosis  Assessment and Plan of Treatment: You were found to have latent tuberculosis on a lab test that we performed. It is important that you follow up with Dr. Iglesias regarding this in 4 days to initiate treatment of latent TB.

## 2022-07-01 NOTE — PROGRESS NOTE ADULT - TIME BILLING
as above
as above
Direct patient care, Discussed on rounds with Housestaff
Direct patient care. Discussed on rounds with Housestaff
I have personally seen and examined this patient.  I have reviewed all pertinent clinical information and reviewed all relevant imaging and diagnostic studies personally.   If possible, I counseled the patient about diagnostic testing and treatment plan.   I discussed my recommendations with the primary team.

## 2022-07-01 NOTE — DISCHARGE NOTE PROVIDER - NSDCMRMEDTOKEN_GEN_ALL_CORE_FT
albuterol 200 mcg inhalation capsule:   aspirin 81 mg oral tablet: 1 tab(s) orally once a day  famotidine 40 mg oral tablet: 1 tab(s) orally once a day (at bedtime)  ferrous sulfate 325 mg (65 mg elemental iron) oral tablet: BID  folic acid 1 mg oral tablet: 1 tab(s) orally once a day  ipratropium-albuterol 0.5 mg-2.5 mg/3 mL inhalation solution: 3 milliliter(s) inhaled every 6 hours, As needed, Bronchospasm  Lasix 20 mg oral tablet: 1 tab(s) orally once a day  lisinopril 5 mg oral tablet: 1 tab(s) orally once a day  losartan 25 mg oral tablet: 1 tab(s) orally once a day  pioglitazone 30 mg oral tablet: 1 tab(s) orally once a day  simvastatin 40 mg oral tablet: 1 tab(s) orally once a day (at bedtime)  Synjardy 12.5 mg-1000 mg oral tablet: 1 tab(s) orally 2 times a day  Trulicity Pen 1.5 mg/0.5 mL subcutaneous solution:    albuterol 200 mcg inhalation capsule:   aspirin 81 mg oral tablet: 1 tab(s) orally once a day  famotidine 40 mg oral tablet: 1 tab(s) orally once a day (at bedtime)  ferrous sulfate 325 mg (65 mg elemental iron) oral tablet: BID  folic acid 1 mg oral tablet: 1 tab(s) orally once a day  Lasix 20 mg oral tablet: 1 tab(s) orally once a day  lisinopril 5 mg oral tablet: 1 tab(s) orally once a day  losartan 25 mg oral tablet: 1 tab(s) orally once a day  NIFEdipine 30 mg oral tablet, extended release: 1 tab(s) orally once a day  pioglitazone 30 mg oral tablet: 1 tab(s) orally once a day  predniSONE 20 mg oral tablet: 2 tab(s) orally once a day   simvastatin 40 mg oral tablet: 1 tab(s) orally once a day (at bedtime)  Synjardy 12.5 mg-1000 mg oral tablet: 1 tab(s) orally 2 times a day  Trulicity Pen 1.5 mg/0.5 mL subcutaneous solution:

## 2022-07-01 NOTE — DISCHARGE NOTE PROVIDER - CARE PROVIDER_API CALL
Thelma Iglesias)  Family Medicine  [address] [place] [address]  Phone: [phone]  Fax: [phone]  Follow Up Time: 1 week    Rodrick Galindo)  Gastroenterology; Internal Medicine  [address] [place] [address]  Phone: [phone]  Fax: [phone]  Follow Up Time: 1 week    ANILA ARROYO  Internal Medicine  [address] [place] [address]  Phone: [phone]  Fax: [phone]  Follow Up Time: 1 week

## 2022-07-01 NOTE — PROGRESS NOTE ADULT - SUBJECTIVE AND OBJECTIVE BOX
ROSS DIETRICH  71y, Male  Allergy: No Known Allergies      LOS  5d    CHIEF COMPLAINT: Cough and weight loss					 (01 Jul 2022 13:45)      INTERVAL EVENTS/HPI  - No acute events overnight- Intern spoke with hematologist who reported that pt has a history of biopsy diagnosed lymphoma and these necrotic LN are stable  - T(F): , Max: 97.2 (06-30-22 @ 16:19)  - Denies any worsening symptoms  - Tolerating medication  - WBC Count: 6.91 (07-01-22 @ 07:47)  WBC Count: 6.49 (06-30-22 @ 07:22)     - Creatinine, Serum: 0.7 (07-01-22 @ 07:47)  Creatinine, Serum: 0.8 (06-30-22 @ 07:22)       ROS  General: Denies rigors, nightsweats  HEENT: Denies headache, rhinorrhea, sore throat, eye pain  CV: Denies CP, palpitations  PULM: Denies wheezing, hemoptysis  GI: Denies hematemesis, hematochezia, melena  : Denies discharge, hematuria  MSK: Denies arthralgias, myalgias  SKIN: Denies rash, lesions  NEURO: Denies paresthesias, weakness  PSYCH: Denies depression, anxiety    VITALS:  T(F): 96.8, Max: 97.2 (06-30-22 @ 16:19)  HR: 80  BP: 147/62  RR: 19Vital Signs Last 24 Hrs  T(C): 36 (01 Jul 2022 08:19), Max: 36.2 (30 Jun 2022 16:19)  T(F): 96.8 (01 Jul 2022 08:19), Max: 97.2 (30 Jun 2022 16:19)  HR: 80 (01 Jul 2022 08:19) (80 - 91)  BP: 147/62 (01 Jul 2022 09:04) (126/57 - 158/70)  BP(mean): --  RR: 19 (01 Jul 2022 08:19) (19 - 19)  SpO2: --    PHYSICAL EXAM:  Gen: NAD, resting in bed  HEENT: Normocephalic, atraumatic  Neck: supple, no lymphadenopathy  CV: Regular rate & regular rhythm  Lungs: decreased BS at bases, no fremitus  Abdomen: Soft, BS present  Ext: Warm, well perfused  Neuro: non focal, awake  Skin: no rash, no erythema  Lines: no phlebitis    FH: Non-contributory  Social Hx: Non-contributory    TESTS & MEASUREMENTS:                        10.5   6.91  )-----------( 265      ( 01 Jul 2022 07:47 )             32.3     07-01    143  |  110  |  9<L>  ----------------------------<  103<H>  4.0   |  28  |  0.7    Ca    7.9<L>      01 Jul 2022 07:47  Mg     1.7     07-01    TPro  3.9<L>  /  Alb  2.0<L>  /  TBili  0.2  /  DBili  x   /  AST  46<H>  /  ALT  13  /  AlkPhos  60  07-01      LIVER FUNCTIONS - ( 01 Jul 2022 07:47 )  Alb: 2.0 g/dL / Pro: 3.9 g/dL / ALK PHOS: 60 U/L / ALT: 13 U/L / AST: 46 U/L / GGT: x               GI PCR Panel, Stool (collected 06-29-22 @ 14:45)  Source: .Stool Feces  Final Report (06-30-22 @ 03:26):    Enteroaggregative E. coli (EAEC)    and    Enteropathogenic E. coli (EPEC)    and    Norovirus GI/GII    DETECTED by PCR    *******Please Note:*******    GI panel PCR evaluates for:    Campylobacter, Plesiomonas shigelloides, Salmonella,    Vibrio, Yersinia enterocolitica, Enteroaggregative    Escherichia coli (EAEC), Enteropathogenic E.coli (EPEC),    Enterotoxigenic E. coli (ETEC) lt/st, Shiga-like    toxin-producing E. coli (STEC) stx1/stx2,    Shigella/ Enteroinvasive E. coli (EIEC), Cryptosporidium,    Cyclospora cayetanensis, Entamoeba histolytica,    Giardia lamblia, Adenovirus F 40/41, Astrovirus,    Norovirus GI/GII, Rotavirus A, Sapovirus    Culture - Acid Fast - Stool w/Smear (collected 06-29-22 @ 14:45)  Source: .Stool Stool    Culture - Acid Fast - Sputum w/Smear (collected 06-29-22 @ 09:24)  Source: .Sputum Sputum    Culture - Fungal, Blood (collected 06-27-22 @ 23:03)  Source: .Blood Blood  Preliminary Report (06-29-22 @ 06:42):    Testing in progress    Culture - Blood (collected 06-27-22 @ 05:36)  Source: .Blood None  Preliminary Report (06-29-22 @ 11:01):    No growth to date.    Culture - Urine (collected 06-25-22 @ 22:57)  Source: Clean Catch Clean Catch (Midstream)  Final Report (06-27-22 @ 19:38):    <10,000 CFU/mL Normal Urogenital Cari        Lactate, Blood: 0.8 mmol/L (06-27-22 @ 05:36)      INFECTIOUS DISEASES TESTING  Fungitell: 49 (06-27-22 @ 23:03)  HIV-1/2 Combo Result: Nonreact (06-27-22 @ 23:03)  Hepatitis C Virus Interpretation Result: Nonreact (06-27-22 @ 05:36)  Procalcitonin, Serum: 0.10 (06-26-22 @ 16:48)  Fungitell: 73 (06-26-22 @ 16:48)  HIV-1/2 Combo Result: Nonreact (06-26-22 @ 05:00)  Rapid RVP Result: Detected (06-25-22 @ 22:58)      INFLAMMATORY MARKERS  C-Reactive Protein, Serum: 5.6 mg/L (06-27-22 @ 05:36)  Sedimentation Rate, Erythrocyte: 5 mm/Hr (06-27-22 @ 05:36)      RADIOLOGY & ADDITIONAL TESTS:  I have personally reviewed the last available Chest xray  CXR      CT      CARDIOLOGY TESTING  12 Lead ECG:   Ventricular Rate 89 BPM    Atrial Rate 89 BPM    P-R Interval 174 ms    QRS Duration 80 ms    Q-T Interval 368 ms    QTC Calculation(Bazett) 447 ms    P Axis 71 degrees    R Axis 71 degrees    T Axis 66 degrees    Diagnosis Line Normal sinus rhythm  Low voltage QRS  Cannot rule out Anterior infarct , age undetermined  Abnormal ECG    Confirmed by Erikcson Bowens ([id number]) on 6/26/2022 11:41:57 AM (06-26-22 @ 01:43)      MEDICATIONS  lisinopril 5 Oral daily  losartan 25 Oral daily  NIFEdipine XL 30 Oral daily  pioglitazone 30 Oral daily      WEIGHT  Weight (kg): 90.7 (06-25-22 @ 22:09)  Creatinine, Serum: 0.7 mg/dL (07-01-22 @ 07:47)      ANTIBIOTICS:      All available historical records have been reviewed

## 2022-07-01 NOTE — PROGRESS NOTE ADULT - SUBJECTIVE AND OBJECTIVE BOX
ROSS DIETRICH 71y Male  MRN#: [id number]   CODE STATUS:________    Hospital Day: 5d        71 yr old male with pmh DM & HLD was presented with 2 month hx of diarrhea, weight loss and cough.   ROS+ Dyspnea on exertion, cough, lower extremity swelling, diarrhea.  Patient reports feeling better at this time. No diarrhea. Still has productive cough. Tolerating advanced diet.   Spoke with patient's niece who reports that her uncle had a similar admission 1 year prior at [facility] and had followed up with oncologist Albaro Ramos who possibly performed a tissue biopsy.   Pending records from Dr. Ramos office which is closed for the holiday weekend                                                                                        ALLERGIES:  No Known Allergies                           HOME MEDICATIONS  Home Medications:  albuterol 200 mcg inhalation capsule:  (26 Jun 2022 11:05)  aspirin 81 mg oral tablet: 1 tab(s) orally once a day (26 Jun 2022 11:06)                           MEDICATIONS:  STANDING MEDICATIONS  benzonatate 100 milliGRAM(s) Oral every 8 hours  enoxaparin Injectable 40 milliGRAM(s) SubCutaneous every 24 hours  guaifenesin/dextromethorphan Oral Liquid 10 milliLiter(s) Oral every 6 hours  insulin glargine Injectable (LANTUS) 8 Unit(s) SubCutaneous at bedtime  insulin lispro (ADMELOG) corrective regimen sliding scale   SubCutaneous three times a day before meals  magnesium sulfate  IVPB 2 Gram(s) IV Intermittent once  NIFEdipine XL 30 milliGRAM(s) Oral daily  pantoprazole    Tablet 40 milliGRAM(s) Oral before breakfast  PPD  5 Tuberculin Unit(s) Injectable 5 Unit(s) IntraDermal once  PPD  5 Tuberculin Unit(s) Injectable 5 Unit(s) IntraDermal once  sodium chloride 0.9% for Nebulization 3 milliLiter(s) Nebulizer daily    PRN MEDICATIONS  albuterol/ipratropium for Nebulization 3 milliLiter(s) Nebulizer every 6 hours PRN  morphine  - Injectable 2 milliGRAM(s) IV Push every 6 hours PRN  oxycodone    5 mG/acetaminophen 325 mG 1 Tablet(s) Oral every 6 hours PRN                                            ------------------------------------------------------------  VITAL SIGNS: Last 24 Hours  T(C): 36 (01 Jul 2022 08:19), Max: 36.2 (30 Jun 2022 16:19)  T(F): 96.8 (01 Jul 2022 08:19), Max: 97.2 (30 Jun 2022 16:19)  HR: 80 (01 Jul 2022 08:19) (80 - 91)  BP: 158/70 (01 Jul 2022 08:19) (126/57 - 158/70)  BP(mean): --  RR: 19 (01 Jul 2022 08:19) (19 - 19)  SpO2: --                                               LABS:                        10.5   6.91  )-----------( 265      ( 01 Jul 2022 07:47 )             32.3     07-01    143  |  110  |  9<L>  ----------------------------<  103<H>  4.0   |  28  |  0.7    Ca    7.9<L>      01 Jul 2022 07:47  Mg     1.7     07-01    TPro  3.9<L>  /  Alb  2.0<L>  /  TBili  0.2  /  DBili  x   /  AST  46<H>  /  ALT  13  /  AlkPhos  60  07-01                GI PCR Panel, Stool (collected 29 Jun 2022 14:45)  Source: .Stool Feces  Final Report (30 Jun 2022 03:26):    Enteroaggregative E. coli (EAEC)    and    Enteropathogenic E. coli (EPEC)    and    Norovirus GI/GII    DETECTED by PCR    *******Please Note:*******    GI panel PCR evaluates for:    Campylobacter, Plesiomonas shigelloides, Salmonella,    Vibrio, Yersinia enterocolitica, Enteroaggregative    Escherichia coli (EAEC), Enteropathogenic E.coli (EPEC),    Enterotoxigenic E. coli (ETEC) lt/st, Shiga-like    toxin-producing E. coli (STEC) stx1/stx2,    Shigella/ Enteroinvasive E. coli (EIEC), Cryptosporidium,    Cyclospora cayetanensis, Entamoeba histolytica,    Giardia lamblia, Adenovirus F 40/41, Astrovirus,    Norovirus GI/GII, Rotavirus A, Sapovirus    Culture - Acid Fast - Stool w/Smear (collected 29 Jun 2022 14:45)  Source: .Stool Stool    Culture - Acid Fast - Sputum w/Smear (collected 29 Jun 2022 09:24)  Source: .Sputum Sputum                                                    RADIOLOGY:        PHYSICAL EXAM:  GENERAL: NAD, lying in bed comfortably  HEAD:  Atraumatic, Normocephalic  EYES: EOMI, PERRLA, conjunctiva and sclera clear  ENT: Moist mucous membranes  NECK: Supple, No JVD  CHEST/LUNG: mild wheeze on auscultation bilaterally; No rales, rhonchi, or rubs. Unlabored respirations but occasional hacking cough is present.   HEART: Regular rate and rhythm; No murmurs, rubs, or gallops  ABDOMEN: BSx4; Soft, nontender, nondistended  EXTREMITIES:  2+ Peripheral Pulses, brisk capillary refill. No clubbing, cyanosis, 1+ edema L>R                                           ASSESSMENT & PLAN  71 yr old male with pmh DM & HLD was presented with 2 month hx of diarrhea, weight loss and cough.   ROS+ Dyspnea on exertion, cough, lower extremity swelling, diarrhea     1. Necrotic Mesenteric Lymphadenitis   -patient not septic now nor on admission   -CT remarkable for long segment ileitis with mesenteric and retroperitoneal necrotic lymphadenopathy   retroperitoneal lymph nodes fully compressing IVC  - d/c ceftriaxone and flagyl per ID   -GI following.    -ID on board and is requesting tissue biopsy. IR cannot access nodes.   -Surgery was consulted to perfom biopsy   -Biopsy may have been performed by Dr. Albaro Ramos outpatient oncology when the patient presented to [facility] for similar s/s 1 year prior.   -2AFB negative, 3rd collected today and pending     -Stool sample remarkable for noro and ecoli -NTD per ID  -f/u QuantiFeron +    2. COVID 19 infection   patient respirating ok on ra.   VACCINATED X3  s/p bebtelovimab      3. DM2   A1C 5.6   FS MONITORING   Lantus 8 units +sliding correction     4. #HTN   -c/wnifedipine   -bp controlled.

## 2022-07-01 NOTE — CONSULT NOTE ADULT - ASSESSMENT
ASSESSMENT:  71M w/ 2 month history of cough, weight loss, anorexia, and recent travel to [place] who is currently being worked up for possible TB.  Imaging with extensive mesenteric and retroperitoneal lymphadenopathy.  Tissue sample needed per ID team to diagnose infectious etiology, no window available for IR guided biopsy.  Surgery consulted for diagnostic laparoscopy and lymph node biopsy.    PLAN:  - Full plan to follow  -  -        Above plan discussed with Attending Surgeon Dr. Cohn  , patient, patient family, and Primary team  07-01-22 @ 10:33    CONSULT SPECTRA: 1991     ASSESSMENT:  71M w/ 2 month history of cough, weight loss, anorexia, and recent travel to [place] who is currently being worked up for possible TB.  Imaging with extensive mesenteric and retroperitoneal lymphadenopathy.  Tissue sample needed per ID team to diagnose infectious etiology, no window available for IR guided biopsy.  Surgery consulted for diagnostic laparoscopy and lymph node biopsy.    PLAN:  - Surgery recalled by primary, patient followed by outside oncologist for known history of lymphoma, imaging this admission reviewed and stable since prior.  Informed that patient no longer requires biopsy.  -  -        Above plan discussed with Attending Surgeon Dr. Cohn  , patient, patient family, and Primary team  07-01-22 @ 10:33    CONSULT SPECTRA: [id number]

## 2022-07-01 NOTE — DISCHARGE NOTE NURSING/CASE MANAGEMENT/SOCIAL WORK - PATIENT PORTAL LINK FT
You can access the FollowMyHealth Patient Portal offered by [facility] by registering at the following website: [email or web address]. By joining [employer]’s FollowMyHealth portal, you will also be able to view your health information using other applications (apps) compatible with our system.

## 2022-07-03 LAB
CULTURE RESULTS: SIGNIFICANT CHANGE UP
SPECIMEN SOURCE: SIGNIFICANT CHANGE UP

## 2022-07-05 ENCOUNTER — TRANSCRIPTION ENCOUNTER (OUTPATIENT)
Age: 72
End: 2022-07-05

## 2022-07-05 LAB — CALPROTECTIN STL-MCNT: 981 UG/G — HIGH (ref 0–120)

## 2022-07-05 NOTE — CHART NOTE - NSCHARTNOTEFT_GEN_A_CORE
Called by lab regarding sputum culture      Culture - Acid Fast - Sputum w/Smear (06.29.22 @ 09:24)    Specimen Source: .Sputum Sputum    Acid Fast Bacilli Smear:   No acid fast bacilli seen by fluorochrome stain    Culture Results:   Growth of acid fast bacilli detected in broth, identification to follow.    Spoke to ID Dr. Alysa Cortez. She will follow up on final results.

## 2022-07-07 ENCOUNTER — OUTPATIENT (OUTPATIENT)
Dept: OUTPATIENT SERVICES | Facility: HOSPITAL | Age: 72
LOS: 1 days | Discharge: HOME | End: 2022-07-07

## 2022-07-07 DIAGNOSIS — M25.562 PAIN IN LEFT KNEE: ICD-10-CM

## 2022-07-07 DIAGNOSIS — M25.561 PAIN IN RIGHT KNEE: ICD-10-CM

## 2022-07-07 PROCEDURE — 73562 X-RAY EXAM OF KNEE 3: CPT | Mod: 26,50

## 2022-07-08 PROBLEM — E11.9 TYPE 2 DIABETES MELLITUS WITHOUT COMPLICATIONS: Chronic | Status: ACTIVE | Noted: 2022-06-26

## 2022-07-08 PROBLEM — E78.5 HYPERLIPIDEMIA, UNSPECIFIED: Chronic | Status: ACTIVE | Noted: 2022-06-26

## 2022-07-13 DIAGNOSIS — I10 ESSENTIAL (PRIMARY) HYPERTENSION: ICD-10-CM

## 2022-07-13 DIAGNOSIS — E87.2 ACIDOSIS: ICD-10-CM

## 2022-07-13 DIAGNOSIS — R05.9 COUGH, UNSPECIFIED: ICD-10-CM

## 2022-07-13 DIAGNOSIS — Z22.7 LATENT TUBERCULOSIS: ICD-10-CM

## 2022-07-13 DIAGNOSIS — Z79.84 LONG TERM (CURRENT) USE OF ORAL HYPOGLYCEMIC DRUGS: ICD-10-CM

## 2022-07-13 DIAGNOSIS — E11.9 TYPE 2 DIABETES MELLITUS WITHOUT COMPLICATIONS: ICD-10-CM

## 2022-07-13 DIAGNOSIS — U07.1 COVID-19: ICD-10-CM

## 2022-07-13 DIAGNOSIS — I88.0 NONSPECIFIC MESENTERIC LYMPHADENITIS: ICD-10-CM

## 2022-07-13 DIAGNOSIS — I87.1 COMPRESSION OF VEIN: ICD-10-CM

## 2022-07-13 DIAGNOSIS — A08.11 ACUTE GASTROENTEROPATHY DUE TO NORWALK AGENT: ICD-10-CM

## 2022-07-13 DIAGNOSIS — C85.90 NON-HODGKIN LYMPHOMA, UNSPECIFIED, UNSPECIFIED SITE: ICD-10-CM

## 2022-07-13 DIAGNOSIS — E87.6 HYPOKALEMIA: ICD-10-CM

## 2022-07-13 DIAGNOSIS — E78.5 HYPERLIPIDEMIA, UNSPECIFIED: ICD-10-CM

## 2022-07-13 DIAGNOSIS — E83.42 HYPOMAGNESEMIA: ICD-10-CM

## 2022-07-13 DIAGNOSIS — J12.82 PNEUMONIA DUE TO CORONAVIRUS DISEASE 2019: ICD-10-CM

## 2022-07-27 LAB
CULTURE RESULTS: SIGNIFICANT CHANGE UP
SPECIMEN SOURCE: SIGNIFICANT CHANGE UP

## 2022-07-27 NOTE — CHART NOTE - NSCHARTNOTEFT_GEN_A_CORE
Culture - Acid Fast - Sputum w/Smear (06.29.22 @ 09:24)    Specimen Source: .Sputum Sputum    Acid Fast Bacilli Smear:   No acid fast bacilli seen by fluorochrome stain    Culture Results:   Mycobacterium abscessus complex isolated  Identified by MALDI-TOF Mass Spectrometry Analysis  The performance characteristics of this test were determined  by [facility] It has not been cleared or approved by  the FDA      Culture showed above. Spoke to ID and recc to call pt and a have him follow up with primary care physician regarding findings. ID was unable to reach pt while I was off service.  Called and Spoke to pt today. I updated the pt on the findings Culture - Acid Fast - Sputum w/Smear (06.29.22 @ 09:24)    Specimen Source: .Sputum Sputum    Acid Fast Bacilli Smear:   No acid fast bacilli seen by fluorochrome stain    Culture Results:   Mycobacterium abscessus complex isolated  Identified by MALDI-TOF Mass Spectrometry Analysis  The performance characteristics of this test were determined  by [facility] It has not been cleared or approved by  the FDA      Culture showed above. [facility] and a have him follow up with primary care physician regarding findings. ID was unable to reach pt while I was off service.  Called and Spoke to pt today. I updated the pt on the findings and he will follow up with primary care physician. Pt primary care physician Dr. John Woodard at [phone]. Called and let message. Still have not heard back to update primary care physician.

## 2022-08-19 LAB
CULTURE RESULTS: SIGNIFICANT CHANGE UP
SPECIMEN SOURCE: SIGNIFICANT CHANGE UP

## 2022-08-20 LAB
CULTURE RESULTS: SIGNIFICANT CHANGE UP
SPECIMEN SOURCE: SIGNIFICANT CHANGE UP

## 2022-09-20 LAB
FAT STL QN: NORMAL — SIGNIFICANT CHANGE UP
FAT STL QN: NORMAL — SIGNIFICANT CHANGE UP

## 2023-05-25 ENCOUNTER — INPATIENT (INPATIENT)
Facility: HOSPITAL | Age: 73
LOS: 12 days | Discharge: HOME | DRG: 841 | End: 2023-06-07
Attending: HOSPITALIST | Admitting: HOSPITALIST
Payer: MEDICARE

## 2023-05-25 VITALS
DIASTOLIC BLOOD PRESSURE: 63 MMHG | HEART RATE: 89 BPM | RESPIRATION RATE: 20 BRPM | TEMPERATURE: 99 F | SYSTOLIC BLOOD PRESSURE: 135 MMHG | OXYGEN SATURATION: 98 %

## 2023-05-25 DIAGNOSIS — N17.9 ACUTE KIDNEY FAILURE, UNSPECIFIED: ICD-10-CM

## 2023-05-25 LAB
ABO RH CONFIRMATION: SIGNIFICANT CHANGE UP
ALBUMIN SERPL ELPH-MCNC: 2.4 G/DL — LOW (ref 3.5–5.2)
ALP SERPL-CCNC: 57 U/L — SIGNIFICANT CHANGE UP (ref 30–115)
ALT FLD-CCNC: 14 U/L — SIGNIFICANT CHANGE UP (ref 0–41)
ANION GAP SERPL CALC-SCNC: 9 MMOL/L — SIGNIFICANT CHANGE UP (ref 7–14)
APPEARANCE UR: CLEAR — SIGNIFICANT CHANGE UP
APTT BLD: 30.3 SEC — SIGNIFICANT CHANGE UP (ref 27–39.2)
AST SERPL-CCNC: 52 U/L — HIGH (ref 0–41)
BACTERIA # UR AUTO: NEGATIVE — SIGNIFICANT CHANGE UP
BASE EXCESS BLDV CALC-SCNC: -7.1 MMOL/L — LOW (ref -2–3)
BASOPHILS # BLD AUTO: 0.04 K/UL — SIGNIFICANT CHANGE UP (ref 0–0.2)
BASOPHILS NFR BLD AUTO: 0.5 % — SIGNIFICANT CHANGE UP (ref 0–1)
BILIRUB SERPL-MCNC: <0.2 MG/DL — SIGNIFICANT CHANGE UP (ref 0.2–1.2)
BILIRUB UR-MCNC: NEGATIVE — SIGNIFICANT CHANGE UP
BLD GP AB SCN SERPL QL: SIGNIFICANT CHANGE UP
BUN SERPL-MCNC: 48 MG/DL — HIGH (ref 10–20)
CA-I SERPL-SCNC: 1.25 MMOL/L — SIGNIFICANT CHANGE UP (ref 1.15–1.33)
CALCIUM SERPL-MCNC: 8.1 MG/DL — LOW (ref 8.4–10.4)
CHLORIDE SERPL-SCNC: 110 MMOL/L — SIGNIFICANT CHANGE UP (ref 98–110)
CO2 SERPL-SCNC: 18 MMOL/L — SIGNIFICANT CHANGE UP (ref 17–32)
COLOR SPEC: SIGNIFICANT CHANGE UP
CREAT SERPL-MCNC: 2 MG/DL — HIGH (ref 0.7–1.5)
DIFF PNL FLD: ABNORMAL
EGFR: 35 ML/MIN/1.73M2 — LOW
EOSINOPHIL # BLD AUTO: 0.14 K/UL — SIGNIFICANT CHANGE UP (ref 0–0.7)
EOSINOPHIL NFR BLD AUTO: 1.8 % — SIGNIFICANT CHANGE UP (ref 0–8)
EPI CELLS # UR: 1 /HPF — SIGNIFICANT CHANGE UP (ref 0–5)
GAS PNL BLDV: 134 MMOL/L — LOW (ref 136–145)
GAS PNL BLDV: SIGNIFICANT CHANGE UP
GAS PNL BLDV: SIGNIFICANT CHANGE UP
GLUCOSE SERPL-MCNC: 123 MG/DL — HIGH (ref 70–99)
GLUCOSE UR QL: ABNORMAL
HCO3 BLDV-SCNC: 19 MMOL/L — LOW (ref 22–29)
HCT VFR BLD CALC: 20.6 % — LOW (ref 42–52)
HCT VFR BLDA CALC: 19 % — CRITICAL LOW (ref 39–51)
HGB BLD CALC-MCNC: 6.2 G/DL — CRITICAL LOW (ref 12.6–17.4)
HGB BLD-MCNC: 6.3 G/DL — CRITICAL LOW (ref 14–18)
HYALINE CASTS # UR AUTO: 2 /LPF — SIGNIFICANT CHANGE UP (ref 0–7)
IMM GRANULOCYTES NFR BLD AUTO: 0.7 % — HIGH (ref 0.1–0.3)
INR BLD: 0.97 RATIO — SIGNIFICANT CHANGE UP (ref 0.65–1.3)
KETONES UR-MCNC: NEGATIVE — SIGNIFICANT CHANGE UP
LACTATE BLDV-MCNC: 1.1 MMOL/L — SIGNIFICANT CHANGE UP (ref 0.5–2)
LEUKOCYTE ESTERASE UR-ACNC: NEGATIVE — SIGNIFICANT CHANGE UP
LYMPHOCYTES # BLD AUTO: 2.63 K/UL — SIGNIFICANT CHANGE UP (ref 1.2–3.4)
LYMPHOCYTES # BLD AUTO: 34.7 % — SIGNIFICANT CHANGE UP (ref 20.5–51.1)
MCHC RBC-ENTMCNC: 23.8 PG — LOW (ref 27–31)
MCHC RBC-ENTMCNC: 30.6 G/DL — LOW (ref 32–37)
MCV RBC AUTO: 77.7 FL — LOW (ref 80–94)
MONOCYTES # BLD AUTO: 0.42 K/UL — SIGNIFICANT CHANGE UP (ref 0.1–0.6)
MONOCYTES NFR BLD AUTO: 5.5 % — SIGNIFICANT CHANGE UP (ref 1.7–9.3)
NEUTROPHILS # BLD AUTO: 4.29 K/UL — SIGNIFICANT CHANGE UP (ref 1.4–6.5)
NEUTROPHILS NFR BLD AUTO: 56.8 % — SIGNIFICANT CHANGE UP (ref 42.2–75.2)
NITRITE UR-MCNC: NEGATIVE — SIGNIFICANT CHANGE UP
NRBC # BLD: 0 /100 WBCS — SIGNIFICANT CHANGE UP (ref 0–0)
PCO2 BLDV: 43 MMHG — SIGNIFICANT CHANGE UP (ref 42–55)
PH BLDV: 7.26 — LOW (ref 7.32–7.43)
PH UR: 5.5 — SIGNIFICANT CHANGE UP (ref 5–8)
PLATELET # BLD AUTO: 339 K/UL — SIGNIFICANT CHANGE UP (ref 130–400)
PMV BLD: 8.7 FL — SIGNIFICANT CHANGE UP (ref 7.4–10.4)
PO2 BLDV: 27 MMHG — SIGNIFICANT CHANGE UP
POTASSIUM BLDV-SCNC: 5.1 MMOL/L — SIGNIFICANT CHANGE UP (ref 3.5–5.1)
POTASSIUM SERPL-MCNC: 6.3 MMOL/L — CRITICAL HIGH (ref 3.5–5)
POTASSIUM SERPL-SCNC: 6.3 MMOL/L — CRITICAL HIGH (ref 3.5–5)
PROT SERPL-MCNC: 4.6 G/DL — LOW (ref 6–8)
PROT UR-MCNC: SIGNIFICANT CHANGE UP
PROTHROM AB SERPL-ACNC: 11.1 SEC — SIGNIFICANT CHANGE UP (ref 9.95–12.87)
RBC # BLD: 2.65 M/UL — LOW (ref 4.7–6.1)
RBC # FLD: 19.1 % — HIGH (ref 11.5–14.5)
RBC CASTS # UR COMP ASSIST: 3 /HPF — SIGNIFICANT CHANGE UP (ref 0–4)
SAO2 % BLDV: 36.7 % — SIGNIFICANT CHANGE UP
SODIUM SERPL-SCNC: 137 MMOL/L — SIGNIFICANT CHANGE UP (ref 135–146)
SP GR SPEC: 1.01 — SIGNIFICANT CHANGE UP (ref 1.01–1.03)
UROBILINOGEN FLD QL: SIGNIFICANT CHANGE UP
WBC # BLD: 7.57 K/UL — SIGNIFICANT CHANGE UP (ref 4.8–10.8)
WBC # FLD AUTO: 7.57 K/UL — SIGNIFICANT CHANGE UP (ref 4.8–10.8)
WBC UR QL: 1 /HPF — SIGNIFICANT CHANGE UP (ref 0–5)

## 2023-05-25 PROCEDURE — 36430 TRANSFUSION BLD/BLD COMPNT: CPT

## 2023-05-25 PROCEDURE — 82436 ASSAY OF URINE CHLORIDE: CPT

## 2023-05-25 PROCEDURE — 36415 COLL VENOUS BLD VENIPUNCTURE: CPT

## 2023-05-25 PROCEDURE — 84133 ASSAY OF URINE POTASSIUM: CPT

## 2023-05-25 PROCEDURE — 93010 ELECTROCARDIOGRAM REPORT: CPT

## 2023-05-25 PROCEDURE — 85025 COMPLETE CBC W/AUTO DIFF WBC: CPT

## 2023-05-25 PROCEDURE — 84300 ASSAY OF URINE SODIUM: CPT

## 2023-05-25 PROCEDURE — 76770 US EXAM ABDO BACK WALL COMP: CPT

## 2023-05-25 PROCEDURE — 71045 X-RAY EXAM CHEST 1 VIEW: CPT

## 2023-05-25 PROCEDURE — 93970 EXTREMITY STUDY: CPT

## 2023-05-25 PROCEDURE — 83036 HEMOGLOBIN GLYCOSYLATED A1C: CPT

## 2023-05-25 PROCEDURE — 84550 ASSAY OF BLOOD/URIC ACID: CPT

## 2023-05-25 PROCEDURE — 84484 ASSAY OF TROPONIN QUANT: CPT

## 2023-05-25 PROCEDURE — 93306 TTE W/DOPPLER COMPLETE: CPT

## 2023-05-25 PROCEDURE — 86850 RBC ANTIBODY SCREEN: CPT

## 2023-05-25 PROCEDURE — 71250 CT THORAX DX C-: CPT

## 2023-05-25 PROCEDURE — 80061 LIPID PANEL: CPT

## 2023-05-25 PROCEDURE — 83010 ASSAY OF HAPTOGLOBIN QUANT: CPT

## 2023-05-25 PROCEDURE — 82962 GLUCOSE BLOOD TEST: CPT

## 2023-05-25 PROCEDURE — 80048 BASIC METABOLIC PNL TOTAL CA: CPT

## 2023-05-25 PROCEDURE — 86334 IMMUNOFIX E-PHORESIS SERUM: CPT

## 2023-05-25 PROCEDURE — 86900 BLOOD TYPING SEROLOGIC ABO: CPT

## 2023-05-25 PROCEDURE — 84156 ASSAY OF PROTEIN URINE: CPT

## 2023-05-25 PROCEDURE — 83615 LACTATE (LD) (LDH) ENZYME: CPT

## 2023-05-25 PROCEDURE — 83880 ASSAY OF NATRIURETIC PEPTIDE: CPT

## 2023-05-25 PROCEDURE — 84100 ASSAY OF PHOSPHORUS: CPT

## 2023-05-25 PROCEDURE — 71045 X-RAY EXAM CHEST 1 VIEW: CPT | Mod: 26

## 2023-05-25 PROCEDURE — 86901 BLOOD TYPING SEROLOGIC RH(D): CPT

## 2023-05-25 PROCEDURE — 85379 FIBRIN DEGRADATION QUANT: CPT

## 2023-05-25 PROCEDURE — 80053 COMPREHEN METABOLIC PANEL: CPT

## 2023-05-25 PROCEDURE — 83735 ASSAY OF MAGNESIUM: CPT

## 2023-05-25 PROCEDURE — 83521 IG LIGHT CHAINS FREE EACH: CPT

## 2023-05-25 PROCEDURE — 74176 CT ABD & PELVIS W/O CONTRAST: CPT

## 2023-05-25 PROCEDURE — 85045 AUTOMATED RETICULOCYTE COUNT: CPT

## 2023-05-25 PROCEDURE — 82607 VITAMIN B-12: CPT

## 2023-05-25 PROCEDURE — 82728 ASSAY OF FERRITIN: CPT

## 2023-05-25 PROCEDURE — P9016: CPT

## 2023-05-25 PROCEDURE — 99285 EMERGENCY DEPT VISIT HI MDM: CPT

## 2023-05-25 PROCEDURE — 84155 ASSAY OF PROTEIN SERUM: CPT

## 2023-05-25 PROCEDURE — 86335 IMMUNFIX E-PHORSIS/URINE/CSF: CPT

## 2023-05-25 PROCEDURE — 85610 PROTHROMBIN TIME: CPT

## 2023-05-25 PROCEDURE — 97162 PT EVAL MOD COMPLEX 30 MIN: CPT | Mod: GP

## 2023-05-25 PROCEDURE — 83540 ASSAY OF IRON: CPT

## 2023-05-25 PROCEDURE — 82784 ASSAY IGA/IGD/IGG/IGM EACH: CPT

## 2023-05-25 PROCEDURE — 86923 COMPATIBILITY TEST ELECTRIC: CPT

## 2023-05-25 PROCEDURE — 83550 IRON BINDING TEST: CPT

## 2023-05-25 PROCEDURE — 82746 ASSAY OF FOLIC ACID SERUM: CPT

## 2023-05-25 PROCEDURE — 85730 THROMBOPLASTIN TIME PARTIAL: CPT

## 2023-05-25 PROCEDURE — 84165 PROTEIN E-PHORESIS SERUM: CPT

## 2023-05-25 PROCEDURE — 85027 COMPLETE CBC AUTOMATED: CPT

## 2023-05-25 PROCEDURE — 82570 ASSAY OF URINE CREATININE: CPT

## 2023-05-25 RX ORDER — DEXTROSE 50 % IN WATER 50 %
50 SYRINGE (ML) INTRAVENOUS ONCE
Refills: 0 | Status: DISCONTINUED | OUTPATIENT
Start: 2023-05-25 | End: 2023-05-25

## 2023-05-25 RX ORDER — CALCIUM GLUCONATE 100 MG/ML
1 VIAL (ML) INTRAVENOUS ONCE
Refills: 0 | Status: COMPLETED | OUTPATIENT
Start: 2023-05-25 | End: 2023-05-25

## 2023-05-25 RX ADMIN — Medication 100 GRAM(S): at 19:14

## 2023-05-25 NOTE — ED PROVIDER NOTE - PHYSICAL EXAMINATION
Pale appearing bilateral upper and lower extremity edema, NAD, non toxic. NCAT PERRLA EOMI neck supple non tender normal wob cta bl rrr abdomen s nt nd no rebound no guarding WWPx4 neuro non focal

## 2023-05-25 NOTE — ED PROVIDER NOTE - CONSIDERATION OF ADMISSION OBSERVATION
Consideration of Admission/Observation Labs and EKG were ordered and reviewed.  Imaging was ordered and reviewed by me.  Appropriate medications for patient's presenting complaints were ordered and effects were reassessed.  Patient's records (prior hospital, ED visit, and/or nursing home notes if available) were reviewed.  Additional history was obtained from EMS, family, and/or PCP (where available).  Escalation to admission/observation was considered. Patient requires inpatient hospitalization - monitored setting.

## 2023-05-25 NOTE — ED PROVIDER NOTE - OBJECTIVE STATEMENT
72-year-old male past medical history of lymphoma hyperlipidemia diabetes iron deficiency anemia on ferrous sulfate presents with hemoglobin 6.5 bilateral swelling upper and lower extremities since 1 week.  No nausea vomiting no fever chills no urinary symptoms no back pain patient well-appearing otherwise

## 2023-05-26 DIAGNOSIS — R09.89 OTHER SPECIFIED SYMPTOMS AND SIGNS INVOLVING THE CIRCULATORY AND RESPIRATORY SYSTEMS: ICD-10-CM

## 2023-05-26 LAB
A1C WITH ESTIMATED AVERAGE GLUCOSE RESULT: 5.6 % — SIGNIFICANT CHANGE UP (ref 4–5.6)
ALBUMIN SERPL ELPH-MCNC: 2.3 G/DL — LOW (ref 3.5–5.2)
ALP SERPL-CCNC: 57 U/L — SIGNIFICANT CHANGE UP (ref 30–115)
ALT FLD-CCNC: 15 U/L — SIGNIFICANT CHANGE UP (ref 0–41)
ANION GAP SERPL CALC-SCNC: 8 MMOL/L — SIGNIFICANT CHANGE UP (ref 7–14)
APTT BLD: 30.8 SEC — SIGNIFICANT CHANGE UP (ref 27–39.2)
AST SERPL-CCNC: 45 U/L — HIGH (ref 0–41)
BASOPHILS # BLD AUTO: 0.05 K/UL — SIGNIFICANT CHANGE UP (ref 0–0.2)
BASOPHILS NFR BLD AUTO: 0.8 % — SIGNIFICANT CHANGE UP (ref 0–1)
BILIRUB SERPL-MCNC: 0.2 MG/DL — SIGNIFICANT CHANGE UP (ref 0.2–1.2)
BLD GP AB SCN SERPL QL: SIGNIFICANT CHANGE UP
BUN SERPL-MCNC: 44 MG/DL — HIGH (ref 10–20)
CALCIUM SERPL-MCNC: 8.3 MG/DL — LOW (ref 8.4–10.5)
CHLORIDE SERPL-SCNC: 110 MMOL/L — SIGNIFICANT CHANGE UP (ref 98–110)
CHLORIDE UR-SCNC: 71 — SIGNIFICANT CHANGE UP
CO2 SERPL-SCNC: 19 MMOL/L — SIGNIFICANT CHANGE UP (ref 17–32)
CREAT ?TM UR-MCNC: 32 MG/DL — SIGNIFICANT CHANGE UP
CREAT SERPL-MCNC: 2 MG/DL — HIGH (ref 0.7–1.5)
D DIMER BLD IA.RAPID-MCNC: 235 NG/ML DDU — HIGH
EGFR: 35 ML/MIN/1.73M2 — LOW
EOSINOPHIL # BLD AUTO: 0.14 K/UL — SIGNIFICANT CHANGE UP (ref 0–0.7)
EOSINOPHIL NFR BLD AUTO: 2.3 % — SIGNIFICANT CHANGE UP (ref 0–8)
ESTIMATED AVERAGE GLUCOSE: 114 MG/DL — SIGNIFICANT CHANGE UP (ref 68–114)
GLUCOSE SERPL-MCNC: 72 MG/DL — SIGNIFICANT CHANGE UP (ref 70–99)
HAPTOGLOB SERPL-MCNC: 143 MG/DL — SIGNIFICANT CHANGE UP (ref 34–200)
HCT VFR BLD CALC: 28.8 % — LOW (ref 42–52)
HGB BLD-MCNC: 9.3 G/DL — LOW (ref 14–18)
IMM GRANULOCYTES NFR BLD AUTO: 0.5 % — HIGH (ref 0.1–0.3)
INR BLD: 0.97 RATIO — SIGNIFICANT CHANGE UP (ref 0.65–1.3)
IRON SATN MFR SERPL: 100 UG/DL — SIGNIFICANT CHANGE UP (ref 35–150)
IRON SATN MFR SERPL: 33 % — SIGNIFICANT CHANGE UP (ref 15–50)
LDH SERPL L TO P-CCNC: 212 U/L — SIGNIFICANT CHANGE UP (ref 50–242)
LYMPHOCYTES # BLD AUTO: 2.19 K/UL — SIGNIFICANT CHANGE UP (ref 1.2–3.4)
LYMPHOCYTES # BLD AUTO: 36 % — SIGNIFICANT CHANGE UP (ref 20.5–51.1)
MAGNESIUM SERPL-MCNC: 1.8 MG/DL — SIGNIFICANT CHANGE UP (ref 1.8–2.4)
MCHC RBC-ENTMCNC: 26.1 PG — LOW (ref 27–31)
MCHC RBC-ENTMCNC: 32.3 G/DL — SIGNIFICANT CHANGE UP (ref 32–37)
MCV RBC AUTO: 80.7 FL — SIGNIFICANT CHANGE UP (ref 80–94)
MONOCYTES # BLD AUTO: 0.37 K/UL — SIGNIFICANT CHANGE UP (ref 0.1–0.6)
MONOCYTES NFR BLD AUTO: 6.1 % — SIGNIFICANT CHANGE UP (ref 1.7–9.3)
NEUTROPHILS # BLD AUTO: 3.3 K/UL — SIGNIFICANT CHANGE UP (ref 1.4–6.5)
NEUTROPHILS NFR BLD AUTO: 54.3 % — SIGNIFICANT CHANGE UP (ref 42.2–75.2)
NRBC # BLD: 0 /100 WBCS — SIGNIFICANT CHANGE UP (ref 0–0)
NT-PROBNP SERPL-SCNC: 609 PG/ML — HIGH (ref 0–300)
PLATELET # BLD AUTO: 280 K/UL — SIGNIFICANT CHANGE UP (ref 130–400)
PMV BLD: 8.5 FL — SIGNIFICANT CHANGE UP (ref 7.4–10.4)
POTASSIUM SERPL-MCNC: 4.8 MMOL/L — SIGNIFICANT CHANGE UP (ref 3.5–5)
POTASSIUM SERPL-SCNC: 4.8 MMOL/L — SIGNIFICANT CHANGE UP (ref 3.5–5)
POTASSIUM UR-SCNC: 22 MMOL/L — SIGNIFICANT CHANGE UP
PROT SERPL-MCNC: 4.4 G/DL — LOW (ref 6–8)
PROTHROM AB SERPL-ACNC: 11.1 SEC — SIGNIFICANT CHANGE UP (ref 9.95–12.87)
RBC # BLD: 3.57 M/UL — LOW (ref 4.7–6.1)
RBC # BLD: 3.57 M/UL — LOW (ref 4.7–6.1)
RBC # FLD: 18.8 % — HIGH (ref 11.5–14.5)
RETICS #: 72.1 K/UL — SIGNIFICANT CHANGE UP (ref 25–125)
RETICS/RBC NFR: 2 % — HIGH (ref 0.5–1.5)
SODIUM SERPL-SCNC: 137 MMOL/L — SIGNIFICANT CHANGE UP (ref 135–146)
SODIUM UR-SCNC: 74 MMOL/L — SIGNIFICANT CHANGE UP
TIBC SERPL-MCNC: 307 UG/DL — SIGNIFICANT CHANGE UP (ref 220–430)
TROPONIN T SERPL-MCNC: 0.06 NG/ML — CRITICAL HIGH
UIBC SERPL-MCNC: 207 UG/DL — SIGNIFICANT CHANGE UP (ref 110–370)
WBC # BLD: 6.08 K/UL — SIGNIFICANT CHANGE UP (ref 4.8–10.8)
WBC # FLD AUTO: 6.08 K/UL — SIGNIFICANT CHANGE UP (ref 4.8–10.8)

## 2023-05-26 PROCEDURE — 99223 1ST HOSP IP/OBS HIGH 75: CPT

## 2023-05-26 PROCEDURE — 74176 CT ABD & PELVIS W/O CONTRAST: CPT | Mod: 26

## 2023-05-26 RX ORDER — FERROUS SULFATE 325(65) MG
325 TABLET ORAL
Refills: 0 | Status: DISCONTINUED | OUTPATIENT
Start: 2023-05-26 | End: 2023-05-28

## 2023-05-26 RX ORDER — FUROSEMIDE 40 MG
20 TABLET ORAL DAILY
Refills: 0 | Status: DISCONTINUED | OUTPATIENT
Start: 2023-05-26 | End: 2023-05-28

## 2023-05-26 RX ORDER — LANOLIN ALCOHOL/MO/W.PET/CERES
3 CREAM (GRAM) TOPICAL AT BEDTIME
Refills: 0 | Status: DISCONTINUED | OUTPATIENT
Start: 2023-05-26 | End: 2023-06-07

## 2023-05-26 RX ORDER — LISINOPRIL 2.5 MG/1
1 TABLET ORAL
Qty: 0 | Refills: 0 | DISCHARGE

## 2023-05-26 RX ORDER — ONDANSETRON 8 MG/1
4 TABLET, FILM COATED ORAL EVERY 8 HOURS
Refills: 0 | Status: DISCONTINUED | OUTPATIENT
Start: 2023-05-26 | End: 2023-06-07

## 2023-05-26 RX ORDER — HEXAVITAMINS
300 TABLET ORAL DAILY
Refills: 0 | Status: DISCONTINUED | OUTPATIENT
Start: 2023-05-26 | End: 2023-05-30

## 2023-05-26 RX ORDER — SIMVASTATIN 20 MG/1
1 TABLET, FILM COATED ORAL
Qty: 0 | Refills: 0 | DISCHARGE

## 2023-05-26 RX ORDER — EMPAGLIFLOZIN, METFORMIN HYDROCHLORIDE 10; 1000 MG/1; MG/1
1 TABLET, EXTENDED RELEASE ORAL
Qty: 0 | Refills: 0 | DISCHARGE

## 2023-05-26 RX ORDER — ALBUTEROL 90 UG/1
1 AEROSOL, METERED ORAL EVERY 8 HOURS
Refills: 0 | Status: DISCONTINUED | OUTPATIENT
Start: 2023-05-26 | End: 2023-06-07

## 2023-05-26 RX ORDER — ACETAMINOPHEN 500 MG
650 TABLET ORAL EVERY 6 HOURS
Refills: 0 | Status: DISCONTINUED | OUTPATIENT
Start: 2023-05-26 | End: 2023-06-07

## 2023-05-26 RX ORDER — PIOGLITAZONE HYDROCHLORIDE 15 MG/1
1 TABLET ORAL
Qty: 0 | Refills: 0 | DISCHARGE

## 2023-05-26 RX ORDER — DULAGLUTIDE 4.5 MG/.5ML
0 INJECTION, SOLUTION SUBCUTANEOUS
Qty: 0 | Refills: 0 | DISCHARGE

## 2023-05-26 RX ORDER — ALBUTEROL 90 UG/1
0 AEROSOL, METERED ORAL
Qty: 0 | Refills: 0 | DISCHARGE

## 2023-05-26 RX ORDER — SIMVASTATIN 20 MG/1
40 TABLET, FILM COATED ORAL AT BEDTIME
Refills: 0 | Status: DISCONTINUED | OUTPATIENT
Start: 2023-05-26 | End: 2023-06-07

## 2023-05-26 RX ORDER — ASPIRIN/CALCIUM CARB/MAGNESIUM 324 MG
1 TABLET ORAL
Qty: 0 | Refills: 0 | DISCHARGE

## 2023-05-26 RX ORDER — FOLIC ACID 0.8 MG
1 TABLET ORAL DAILY
Refills: 0 | Status: DISCONTINUED | OUTPATIENT
Start: 2023-05-26 | End: 2023-06-07

## 2023-05-26 RX ORDER — LOSARTAN POTASSIUM 100 MG/1
25 TABLET, FILM COATED ORAL DAILY
Refills: 0 | Status: DISCONTINUED | OUTPATIENT
Start: 2023-05-26 | End: 2023-05-27

## 2023-05-26 RX ORDER — ASPIRIN/CALCIUM CARB/MAGNESIUM 324 MG
81 TABLET ORAL DAILY
Refills: 0 | Status: DISCONTINUED | OUTPATIENT
Start: 2023-05-26 | End: 2023-05-26

## 2023-05-26 RX ADMIN — Medication 325 MILLIGRAM(S): at 17:26

## 2023-05-26 RX ADMIN — SIMVASTATIN 40 MILLIGRAM(S): 20 TABLET, FILM COATED ORAL at 21:21

## 2023-05-26 NOTE — H&P ADULT - HISTORY OF PRESENT ILLNESS
72-year-old male w/ hx of lymphoma, necrotic mesenteric lymphadenitis, HLD, DMA, and SHAI, known to Dr. Iglesias (pcp), presents for Upper and lower b/l extremity swelling. Pt reports that he first noticed symptoms 1 week ago and they have progressively worsened. He denies urinary retention dysuria, hematuria, or any previous events of swelling in the extremities. Denies f,c,n,v,sob,cp,abd pain, diarrhea.    In the Ed, vitals 144/65, HR 82, temp 97.6F; labs s/f hgb 6.3, creat 0.7-->2, BUN 48, CXR nonremarkable, EKG NSR, hemolyzed potassium, other unremarkable labs, UA neg for LE, Nit, positive for glucosuria, INR and PTT within normal limits.  Pt recieved blood transfusion and is being admitted for further workup of acute anemia in the setting of MAXX.    **[facility] [phone]; opens at 10AM, Please confirm Med Rec** 72-year-old male w/ hx of lymphoma, necrotic mesenteric lymphadenitis, HLD, DMA, and SHAI, known to Dr. Iglesias (pcp), presents for Upper and lower b/l extremity swelling. Pt reports that he first noticed symptoms 1 week ago and they have progressively worsened. He denies urinary retention dysuria, hematuria, or any previous events of swelling in the extremities. Denies f,c,n,v,sob,cp,abd pain, diarrhea.    In the Ed, vitals 144/65, HR 82, temp 97.6F; labs s/f hgb 6.3, creat 0.7-->2, BUN 48, CXR nonremarkable, EKG NSR, hemolyzed potassium, other unremarkable labs, UA neg for LE, Nit, positive for glucosuria, INR and PTT within normal limits.  Pt received blood transfusion and is being admitted for further workup of acute anemia in the setting of MAXX.    **[facility] [phone]; opens at 10AM, Please confirm Med Rec**

## 2023-05-26 NOTE — CHART NOTE - NSCHARTNOTEFT_GEN_A_CORE
Med reconciliation completed with patient [facility] pharmacy @ [phone]. Med reconciliation completed with patient [facility] @ [phone].  called [facility] office [phone]  to obtain medical records of the patient. will call me back with records.

## 2023-05-26 NOTE — H&P ADULT - ASSESSMENT
72-year-old male w/ hx of lymphoma, necrotic mesenteric lymphadenitis, HLD, DMA, and SHAI, known to Dr. Iglesias (pcp), presents for Upper and lower b/l extremity swelling. Pt reports that he first noticed symptoms 1 week ago and they have progressively worsened. He denies urinary retention dysuria, hematuria, or any previous events of swelling in the extremities. Denies f,c,n,v,sob,cp,abd pain, diarrhea.    In the Ed, vitals 144/65, HR 82, temp 97.6F; labs s/f hgb 6.3, creat 0.7-->2, BUN 48, CXR nonremarkable, EKG NSR, hemolyzed potassium, other unremarkable labs, UA neg for LE, Nit, positive for glucosuria, INR and PTT within normal limits.  Pt recieved blood transfusion and is being admitted for further workup of acute anemia in the setting of MAXX.    **[facility] [phone]; opens at 10AM, Please confirm Med Rec**    #b/l extremity edema in s/o MAXX and history of lymphoma, rule out DVT, rule out CHF  - BUN 48, Creat 0.7-->2, denies dysuria/retention  - monitor ins/outs; f/u urine lytes  - f/u RBUS/Bladder Scan  - f/u trop, bnp, d-dimer, TTE    #acute anemia unknown etiology in s/o lymphoma history (Known by [facility] Dr. Ramos)  #hx of SHAI  - hgb 6.3 MCV 77; PTT 30, INR 0.97  - no signs of active blood loss  - f/u hemolytic w/u retic count, LDH, Hapto  - keep t&s active, monitor Hgb  - s/p blood transfusion    #HLD  #DM  - c/w home statin  - f/u a1c, f/u lipid panel                                                                              ----------------------------------------------------  # DVT prophylaxis: hold in s/o acute anemia    # GI prophylaxis: panto    # Diet: renal    # Disposition: inpt                                                                             --------------------------------------------------------    # Handoff   - f/u RBUS/BS, urine lytes, hemolytic w/u, trop, d-dimer, TTE,

## 2023-05-26 NOTE — PROGRESS NOTE ADULT - ASSESSMENT
72-year-old male w/ hx of lymphoma, necrotic mesenteric lymphadenitis, HLD, DMA, and SHAI, known to Dr. Iglesias (pcp), presents for Upper and lower b/l extremity swelling. Pt reports that he first noticed symptoms 1 week ago and they have progressively worsened. He denies urinary retention dysuria, hematuria, or any previous events of swelling in the extremities.     In the Ed, vitals 144/65, HR 82, temp 97.6F; labs s/f hgb 6.3, creat 0.7-->2, BUN 48, CXR nonremarkable, EKG NSR, hemolyzed potassium, other unremarkable labs, UA neg for LE, Nit, positive for glucosuria, INR and PTT within normal limits.    Pt recieved blood transfusion and is being admitted for further workup of acute anemia in the setting of MAXX.    #b/l extremity edema  #MAXX  #rule out DVT  #rule out CHF  - f/u RBUS/Bladder Scan-- PENDING  - Urine lytes-- pending   - trop-- 0.06  - bnp--- 609  - d-dimer--- 235  - TTE-- pending  - Doppler LE venous : Pending   - Obtain record from PCP Dr Iglesias office -- called office-- will call me back for records-- pending     #acute anemia unknown etiology  #lymphoma history (Known by [facility] Dr. Ramos)  #hx of SHAI  - As per pt he does not have any cancer hx  - hgb 6.3 MCV 77; PTT 30, INR 0.97  - no signs of active blood loss  - Retic  count 2%, , Haptoglobin --pending   - CT abdomen - pelvis-- to rule out bleed-- pending   - keep t&s active, monitor Hgb  - s/p blood transfusion    #HLD  #DM  - c/w home statin  - f/u a1c, f/u lipid panel    #Latent TB??  - On isoniazid   - Pt is unsure why he is on isoniazid                                                                               ----------------------------------------------------  DVT prophylaxis: Start on SCD once Doppler venous negative for DVT    Activity:  Diet: Dash   Dispo:  Code: full                                                                           --------------------------------------------------------    # Handoff   - f/u RBUS/BS, urine lytes, hemolytic w/u, trop, d-dimer, TTE, CT abdomen and pelvis     Patient and wife updated at bedside      72-year-old male w/ hx of lymphoma, necrotic mesenteric lymphadenitis, HLD, DMA, and SHAI, known to Dr. Iglesias (pcp), presents for Upper and lower b/l extremity swelling. Pt reports that he first noticed symptoms 1 week ago and they have progressively worsened. He denies urinary retention dysuria, hematuria, or any previous events of swelling in the extremities.     In the Ed, vitals 144/65, HR 82, temp 97.6F; labs s/f hgb 6.3, creat 0.7-->2, BUN 48, CXR nonremarkable, EKG NSR, hemolyzed potassium, other unremarkable labs, UA neg for LE, Nit, positive for glucosuria, INR and PTT within normal limits.    Pt recieved blood transfusion and is being admitted for further workup of acute anemia in the setting of MAXX.    #b/l extremity edema  #MAXX  #rule out DVT  #rule out CHF  - f/u RBUS/Bladder Scan-- PENDING  - Urine lytes-- pending   - trop-- 0.06  - bnp--- 609  - d-dimer--- 235  - TTE-- pending  - Doppler LE venous : Pending   - Obtain record from PCP Dr Iglesias office -- called office-- will call me back for records-- pending     #acute anemia unknown etiology  #lymphoma history (Known by [facility] Dr. Ramos)  #hx of SHAI  - As per pt he does not have any cancer hx. On previous [facility] notes: Biopsy proven Lymphoma with hx of mesenteric lymphadenitis  - hgb 6.3 MCV 77; PTT 30, INR 0.97  - no signs of active blood loss  - Retic  count 2%, , Haptoglobin --pending   - CT abdomen - pelvis-- to rule out bleed-- pending   - keep t&s active, monitor Hgb  - s/p blood transfusion  - fu GI  - fu Heme/onco    #Latent TB  #QuantiFeron TB +ve  #sputum culture + for Mycobacteroides abscessus  - On isoniazid   - Pt is unsure why he is on isoniazid     #HLD  #DM  - c/w home statin  - f/u a1c, f/u lipid panel                                                                             DVT prophylaxis: Start on SCD once Doppler venous negative for DVT    Activity:  Diet: Dash   Dispo:  Code: full                                                            # Handoff   - f/u RBUS/BS, urine lytes, hemolytic w/u, trop, d-dimer, TTE, CT abdomen and pelvis . fu GI and heme/onco    Patient and wife updated at bedside

## 2023-05-26 NOTE — H&P ADULT - ATTENDING COMMENTS
Pt seen and examined on 4B. Previous notes reviewed by me. Wife at bedside. He presents with several days of UE and LE swelling along with some SOB and weakness. He and wife deny any bleeding. No fever, chills, chest pain, N/V, abdominal pain. Reaching out to PMD to clarify PMH.   ROS negative    T(F): 98 (05-26-23 @ 07:46), Max: 98 (05-26-23 @ 07:46)  HR: 78 (05-26-23 @ 16:05) (78 - 88)  BP: 157/68 (05-26-23 @ 16:05) (121/90 - 172/75)  RR: 18 (05-26-23 @ 16:05) (16 - 18)  SpO2: 97% (05-26-23 @ 16:05) (97% - 98%) on RA    PHYSICAL EXAM:  GENERAL: NAD  HEAD:  Normocephalic  EYES:  conjunctiva pale  ENMT: Moist mucous membranes  NERVOUS SYSTEM:  Alert, awake, Good concentration  CHEST/LUNG: CTA b/l  HEART: Regular rate and rhythm  ABDOMEN: Soft, Nontender, Nondistended; Bowel sounds present  EXTREMITIES:  1 + UE and 2 + LE edema  SKIN: warm, dry    Consultant(s) Notes Reviewed:  [x ] YES  [ ] NO  Care Discussed with Consultants/Other Providers [ x] YES  [ ] NO    LABS:                        9.3    6.08  )-----------( 280      ( 26 May 2023 08:00 )             28.8     05-26    137  |  110  |  44<H>  ----------------------------<  72  4.8   |  19  |  2.0<H>    Ca    8.3<L>      26 May 2023 08:00  Mg     1.8     05-26    TPro  4.4<L>  /  Alb  2.3<L>  /  TBili  0.2  /  DBili  x   /  AST  45<H>  /  ALT  15  /  AlkPhos  57  05-26  PT/INR - ( 26 May 2023 08:00 )   PT: 11.10 sec;   INR: 0.97 ratio  PTT - ( 26 May 2023 08:00 )  PTT:30.8 sec    RADIOLOGY & ADDITIONAL TESTS:  Imaging and report Personally Reviewed:  [x ] YES  [ ] NO   CXR 5/25 - blunted right costophrenic angle  Case discussed with resident and RN today    72 y/o man with PMH of low grade lymphoma, DM type 2, mesenteric lymphangitis dx in 2022 (related to lymphoma and results known to his oncologist), latent TB and on INH, iron deficiency anemia on oral iron and HTN now presents with UE and LE edema and found to have microcytic anemia and MAXX.     1. Microcytic anemia - now s/p 2 units PRBC and Hgb is improved - pt and wife deny BRBPR or melena  retic count 2,   iron studies WNL but done after transfusion  f/u haptoglobin, ferritin  pt's wife states that he had EGD/colonoscopy 1 year ago but I do not see any report in EMR (pt and wife are not good historians - denied h/o lymphoma)  rule out blood loss, anemia due to BM suppression, worsening of lymphoma  GI consulted - consider EGD/colonoscopy  CT scan of abd/pelvis -noncontrast - rule out hemorrhage   unable to do CTA due to MAXX  HemOnc consult   check peripheral smear, SPEP, UPEP, f/u serum immunofixation  CBC in AM  keep active T&S  transfuse for Hgb <7    2. MAXX - Cr remains at 2  rule out prerenal - holding ARB, diuretic, encourage PO hydration - hold IVF for now due to edema  renal/bladder US - rule out hydronephrosis  check FeUrea  UA with small blood and glucose >500  avoid nephrotoxic meds  daily BMP    3. UE and LE edema - previous notes reviewed and pt without edema per last admission  on furosemide 20mg daily at home  f/u venous duplex   Albumin 2.4 (stable from last year)  ? due to underlying pathology - doubt CHF but check ECHO (low voltage on EKG and trop 0.06  - repeat)    4. Latent TB - on isoniazid - clarify start date of therapy  5. DM type 2 - monitor FS - A1C 5.6  6. DVT prophylaxis - SCD if venous duplex of LE is negative    full code  guarded prognosis

## 2023-05-26 NOTE — PATIENT PROFILE ADULT - FALL HARM RISK - RISK INTERVENTIONS

## 2023-05-26 NOTE — H&P ADULT - NSHPPHYSICALEXAM_GEN_ALL_CORE
VITALS:   T(C): 36.1 (05-26-23 @ 02:32), Max: 37.1 (05-25-23 @ 16:29)  HR: 88 (05-26-23 @ 02:32) (80 - 89)  BP: 121/90 (05-26-23 @ 02:32) (121/90 - 152/66)  RR: 17 (05-26-23 @ 02:32) (16 - 20)  SpO2: 98% (05-25-23 @ 23:20) (98% - 98%)    GENERAL: NAD, lying in bed comfortably, receiving blood transfusion  HEAD:  Atraumatic, normocephalic  EYES: EOMI, PERRLA, conjunctiva pale, and sclera clear  ENT: Moist mucous membranes  NECK: Supple, no JVD  HEART: Regular rate and rhythm, no murmurs, rubs, or gallops  LUNGS: Unlabored respirations.  Clear to auscultation bilaterally, no crackles, wheezing, or rhonchi  ABDOMEN: Soft, nontender, nondistended, +BS  EXTREMITIES: b/l upper and lower extremity swelling  NERVOUS SYSTEM:  A&Ox3, no focal deficits   SKIN: No rashes or lesions

## 2023-05-26 NOTE — H&P ADULT - NSHPLABSRESULTS_GEN_ALL_CORE
6.3    7.57  )-----------( 339      ( 25 May 2023 18:22 )             20.6           137  |  110  |  48<H>  ----------------------------<  123<H>  6.3<HH>   |  18  |  2.0<H>    Ca    8.1<L>      25 May 2023 18:22    TPro  4.6<L>  /  Alb  2.4<L>  /  TBili  <0.2  /  DBili  x   /  AST  52<H>  /  ALT  14  /  AlkPhos  57                Urinalysis Basic - ( 25 May 2023 21:46 )    Color: Light Yellow / Appearance: Clear / S.012 / pH: x  Gluc: x / Ketone: Negative  / Bili: Negative / Urobili: <2 mg/dL   Blood: x / Protein: Trace / Nitrite: Negative   Leuk Esterase: Negative / RBC: 3 /HPF / WBC 1 /HPF   Sq Epi: x / Non Sq Epi: x / Bacteria: Negative        PT/INR - ( 25 May 2023 18:22 )   PT: 11.10 sec;   INR: 0.97 ratio         PTT - ( 25 May 2023 18:22 )  PTT:30.3 sec          CAPILLARY BLOOD GLUCOSE

## 2023-05-27 LAB
ALBUMIN SERPL ELPH-MCNC: 2.3 G/DL — LOW (ref 3.5–5.2)
ALP SERPL-CCNC: 53 U/L — SIGNIFICANT CHANGE UP (ref 30–115)
ALT FLD-CCNC: 15 U/L — SIGNIFICANT CHANGE UP (ref 0–41)
ANION GAP SERPL CALC-SCNC: 7 MMOL/L — SIGNIFICANT CHANGE UP (ref 7–14)
AST SERPL-CCNC: 49 U/L — HIGH (ref 0–41)
BASOPHILS # BLD AUTO: 0.04 K/UL — SIGNIFICANT CHANGE UP (ref 0–0.2)
BASOPHILS NFR BLD AUTO: 0.5 % — SIGNIFICANT CHANGE UP (ref 0–1)
BILIRUB SERPL-MCNC: <0.2 MG/DL — SIGNIFICANT CHANGE UP (ref 0.2–1.2)
BUN SERPL-MCNC: 39 MG/DL — HIGH (ref 10–20)
CALCIUM SERPL-MCNC: 8 MG/DL — LOW (ref 8.4–10.5)
CHLORIDE SERPL-SCNC: 106 MMOL/L — SIGNIFICANT CHANGE UP (ref 98–110)
CHOLEST SERPL-MCNC: 88 MG/DL — SIGNIFICANT CHANGE UP
CO2 SERPL-SCNC: 19 MMOL/L — SIGNIFICANT CHANGE UP (ref 17–32)
CREAT SERPL-MCNC: 1.9 MG/DL — HIGH (ref 0.7–1.5)
EGFR: 37 ML/MIN/1.73M2 — LOW
EOSINOPHIL # BLD AUTO: 0.16 K/UL — SIGNIFICANT CHANGE UP (ref 0–0.7)
EOSINOPHIL NFR BLD AUTO: 2.1 % — SIGNIFICANT CHANGE UP (ref 0–8)
FERRITIN SERPL-MCNC: 19 NG/ML — LOW (ref 30–400)
GLUCOSE SERPL-MCNC: 86 MG/DL — SIGNIFICANT CHANGE UP (ref 70–99)
HCT VFR BLD CALC: 29.6 % — LOW (ref 42–52)
HDLC SERPL-MCNC: 37 MG/DL — LOW
HGB BLD-MCNC: 9.5 G/DL — LOW (ref 14–18)
IMM GRANULOCYTES NFR BLD AUTO: 0.4 % — HIGH (ref 0.1–0.3)
LIPID PNL WITH DIRECT LDL SERPL: 26 MG/DL — SIGNIFICANT CHANGE UP
LYMPHOCYTES # BLD AUTO: 2.53 K/UL — SIGNIFICANT CHANGE UP (ref 1.2–3.4)
LYMPHOCYTES # BLD AUTO: 33.8 % — SIGNIFICANT CHANGE UP (ref 20.5–51.1)
MAGNESIUM SERPL-MCNC: 1.8 MG/DL — SIGNIFICANT CHANGE UP (ref 1.8–2.4)
MCHC RBC-ENTMCNC: 25.5 PG — LOW (ref 27–31)
MCHC RBC-ENTMCNC: 32.1 G/DL — SIGNIFICANT CHANGE UP (ref 32–37)
MCV RBC AUTO: 79.6 FL — LOW (ref 80–94)
MONOCYTES # BLD AUTO: 0.56 K/UL — SIGNIFICANT CHANGE UP (ref 0.1–0.6)
MONOCYTES NFR BLD AUTO: 7.5 % — SIGNIFICANT CHANGE UP (ref 1.7–9.3)
NEUTROPHILS # BLD AUTO: 4.16 K/UL — SIGNIFICANT CHANGE UP (ref 1.4–6.5)
NEUTROPHILS NFR BLD AUTO: 55.7 % — SIGNIFICANT CHANGE UP (ref 42.2–75.2)
NON HDL CHOLESTEROL: 51 MG/DL — SIGNIFICANT CHANGE UP
NRBC # BLD: 0 /100 WBCS — SIGNIFICANT CHANGE UP (ref 0–0)
PLATELET # BLD AUTO: 288 K/UL — SIGNIFICANT CHANGE UP (ref 130–400)
PMV BLD: 8.5 FL — SIGNIFICANT CHANGE UP (ref 7.4–10.4)
POTASSIUM SERPL-MCNC: 4.7 MMOL/L — SIGNIFICANT CHANGE UP (ref 3.5–5)
POTASSIUM SERPL-SCNC: 4.7 MMOL/L — SIGNIFICANT CHANGE UP (ref 3.5–5)
PROT ?TM UR-MCNC: 22 MG/DLG/24H — SIGNIFICANT CHANGE UP
PROT SERPL-MCNC: 4.3 G/DL — LOW (ref 6–8)
RBC # BLD: 3.72 M/UL — LOW (ref 4.7–6.1)
RBC # FLD: 19.1 % — HIGH (ref 11.5–14.5)
SODIUM SERPL-SCNC: 132 MMOL/L — LOW (ref 135–146)
TRIGL SERPL-MCNC: 128 MG/DL — SIGNIFICANT CHANGE UP
TROPONIN T SERPL-MCNC: 0.05 NG/ML — CRITICAL HIGH
WBC # BLD: 7.48 K/UL — SIGNIFICANT CHANGE UP (ref 4.8–10.8)
WBC # FLD AUTO: 7.48 K/UL — SIGNIFICANT CHANGE UP (ref 4.8–10.8)

## 2023-05-27 PROCEDURE — 99223 1ST HOSP IP/OBS HIGH 75: CPT

## 2023-05-27 PROCEDURE — 93970 EXTREMITY STUDY: CPT | Mod: 26

## 2023-05-27 PROCEDURE — 76770 US EXAM ABDO BACK WALL COMP: CPT | Mod: 26

## 2023-05-27 PROCEDURE — 99233 SBSQ HOSP IP/OBS HIGH 50: CPT

## 2023-05-27 PROCEDURE — 71250 CT THORAX DX C-: CPT | Mod: 26

## 2023-05-27 RX ORDER — NIFEDIPINE 30 MG
30 TABLET, EXTENDED RELEASE 24 HR ORAL DAILY
Refills: 0 | Status: DISCONTINUED | OUTPATIENT
Start: 2023-05-27 | End: 2023-05-31

## 2023-05-27 RX ADMIN — LOSARTAN POTASSIUM 25 MILLIGRAM(S): 100 TABLET, FILM COATED ORAL at 05:12

## 2023-05-27 RX ADMIN — SIMVASTATIN 40 MILLIGRAM(S): 20 TABLET, FILM COATED ORAL at 21:28

## 2023-05-27 RX ADMIN — Medication 300 MILLIGRAM(S): at 13:09

## 2023-05-27 RX ADMIN — Medication 325 MILLIGRAM(S): at 18:41

## 2023-05-27 RX ADMIN — Medication 1 MILLIGRAM(S): at 13:08

## 2023-05-27 RX ADMIN — Medication 30 MILLIGRAM(S): at 05:14

## 2023-05-27 RX ADMIN — Medication 325 MILLIGRAM(S): at 05:11

## 2023-05-27 RX ADMIN — Medication 20 MILLIGRAM(S): at 05:12

## 2023-05-27 NOTE — CONSULT NOTE ADULT - ASSESSMENT
72-year-old male w/ hx of lymphoma, necrotic mesenteric lymphadenitis, HLD, DMA, and SHAI, known to Dr. Iglesias (pcp), presents for Upper and lower b/l extremity swelling. Pt reports that he first noticed symptoms 1 week ago and they have progressively worsened. denies any melena, hematochezia, diarrhea, vomiting abdominal pain or dysphagia   Patient was admitted for /e edema workup. Labs on admission significant for anemia. hx of SHAI, received transfusion in ED. GI consulted for anemia     # H/O SHAI. No overt GI bleeding   - No previous EGD or colonoscopy   - admitted for leg swelling   - Hg on admission 6.3 s/p 2 PRBCs (baseline 10)  - HD stable   - No FH of CRC   - Iron profile noted.   - JO ANN brown stool     PLAN   Advance diet   PPI QD   Avoid NSAIDs  Transfuse to keep hemoglobin > 8   Complete anemia workup   Hematology following   Will plan for EGD and colonoscopy as outpatient   Patient understands he needs to follow up with GI closely  after discharge to schedule EGD and colonoscopy   - Follow up with our [facility] located at [address] [place] [address]. Phone Number: [phone]

## 2023-05-27 NOTE — PROGRESS NOTE ADULT - ASSESSMENT
72 y/o man with PMH of low grade lymphoma, DM type 2, mesenteric lymphangitis dx in 2022 (related to lymphoma and results known to his oncologist), latent TB and on INH, iron deficiency anemia on oral iron and HTN now presents with UE and LE edema and found to have microcytic anemia and MAXX.   PMD: Tauqeealyson Sheltonnichol    1. 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  pt's wife states that he had EGD/colonoscopy 1 year ago here but I do not see any report in EMR (pt and wife are not good historians - denied h/o lymphoma initially)  rule out blood loss, anemia due to BM suppression, worsening of lymphoma  GI consulted - consider EGD/colonoscopy  CT scan of abd/pelvis -noncontrast - no hemorrhage   unable to do CTA due to MAXX  HemOnc consulted  obtain pathology report from [facility] - biopsy by Dr. Albaro Ramos which was remarkable for low grade lymphoma  check peripheral smear, SPEP, UPEP, f/u serum and urine immunofixation  CBC in AM  keep active T&S  transfuse for Hgb <7    2. MAXX - Cr remains around 2  rule out prerenal - holding ARB, diuretic, encourage PO hydration - hold IVF for now due to edema - pt is eating and drinking  check renal/bladder US   no hydronephrosis on CT scan  check FeUrea  UA with small blood, trace protein and glucose >500  avoid nephrotoxic meds  daily BMP  renal consulted    3. UE and LE edema - previous notes reviewed and pt without edema per last admission  on furosemide 20mg daily at home  f/u venous duplex LE - ddimer 235  Albumin 2.4 (stable from last year)  ? due to underlying pathology - doubt CHF but check ECHO (low voltage on EKG and trop 0.06  - repeat)    4. h/o low grade lymphoma  CT scan chest: Multiple prominent subcentimeter lymph nodes within the prevascular, paraaortic and lower paratracheal regions. Additional prominent lymph   nodes within the bilateral axilla.  CT scan abd/pelvis: Multiple enlarged mesenteric lymphadenopathy. Additional enlarged retroperitoneal lymph nodes.  HemOnc consult in progress  obtain pathology report from [facility]    4. Latent TB - on isoniazid - clarify start date of therapy    5. DM type 2 - monitor FS - A1C 5.6    6. DVT prophylaxis - SCD if venous duplex of LE is negative or heparin SQ if H/H remains stable    full code      PROGRESS NOTE HANDOFF    Pending: HemOnc, GI, renal consults, labs in AM, ECHO, venous duplex LE    Disposition: from home   72 y/o man with PMH of low grade lymphoma, DM type 2, mesenteric lymphangitis dx in 2022 (related to lymphoma and results known to his oncologist), latent TB and on INH, iron deficiency anemia on oral iron and HTN now presents with UE and LE edema and found to have microcytic anemia and MAXX.   previous notes reviewed by me  PMD: Kelsie Lopez    1. 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  pt's wife states that he had EGD/colonoscopy 1 year ago here but I do not see any report in EMR (pt and wife are not good historians - denied h/o lymphoma initially)  rule out blood loss, anemia due to BM suppression, worsening of lymphoma  GI consulted - consider EGD/colonoscopy  CT scan of abd/pelvis -noncontrast - no hemorrhage   unable to do CTA due to MAXX  HemOnc consulted - case discussed with the fellow today  obtain pathology report from [facility] - biopsy by Dr. Albaro Ramos which was remarkable for low grade lymphoma  check peripheral smear, SPEP, UPEP, f/u serum and urine immunofixation  CBC in AM  keep active T&S  transfuse for Hgb <7    2. MAXX - Cr remains around 2  rule out prerenal - holding ARB, diuretic, encourage PO hydration - hold IVF for now due to edema - pt is eating and drinking  check renal/bladder US   no hydronephrosis on CT scan  check FeUrea  UA with small blood, trace protein and glucose >500  avoid nephrotoxic meds  daily BMP  renal consulted    3. UE and LE edema - previous notes reviewed and pt without edema per last admission  on furosemide 20mg daily at home  f/u venous duplex LE - ddimer 235  Albumin 2.4 (stable from last year)  ? due to underlying pathology - doubt CHF but check ECHO (low voltage on EKG and trop 0.06  - repeat)    4. h/o low grade lymphoma  CT scan chest: Multiple prominent subcentimeter lymph nodes within the prevascular, paraaortic and lower paratracheal regions. Additional prominent lymph   nodes within the bilateral axilla.  CT scan abd/pelvis: Multiple enlarged mesenteric lymphadenopathy. Additional enlarged retroperitoneal lymph nodes.  HemOnc consult in progress  obtain pathology report from [facility]    4. Latent TB - on isoniazid - clarify start date of therapy    5. DM type 2 - monitor FS - A1C 5.6    6. DVT prophylaxis - SCD if venous duplex of LE is negative or heparin SQ if H/H remains stable    full code      PROGRESS NOTE HANDOFF    Pending: HemOnc, GI, renal consults, labs in AM, ECHO, venous duplex LE    Disposition: from home

## 2023-05-27 NOTE — CONSULT NOTE ADULT - ATTENDING COMMENTS
presented for LE edema, admitted for volume overload ongoing cardia work up. GI consulted for anemia. No overt bleeding. Hx of lymphoma (in remission?) SP 2 units PRBC. will benefit from endoscopic work up of SHAI
seen/ examined w/ hemonc fellow; note reviewed; case discussed; agree w/ plan

## 2023-05-27 NOTE — CONSULT NOTE ADULT - ASSESSMENT
72-year-old male w/ hx of lymphoma, necrotic mesenteric lymphadenitis, HLD, DMA, and SHAI, known to Dr. Iglesias (pcp), presents for Upper and lower b/l extremity swelling. He was found to be anemic with MAXX.     IMPRESSION    #Worsening of chronic microcytic anemia with evidence of iron deficiency  -Iron studies: Iron total 100, TIBC 07, %sat 33%, ferritin 19  -He also has MAXX during this admission    #Hx of low grade lymphoma?   - He had a biopsy by Dr. Albaro Ramos which was remarkable for low grade lymphoma, in [facility] about 2 years  - CT shows mutliple enlarged mesenteric and RP LAD which was also present on CT done last year    #Hx of latent TB with +quantiferon     RECOMMENDATIONS:     72-year-old male w/ hx of lymphoma, necrotic mesenteric lymphadenitis, HLD, DMA, and SHAI, known to Dr. Iglesias (pcp), presents for Upper and lower b/l extremity swelling. He was found to be anemic with MAXX.     IMPRESSION    #Worsening of chronic microcytic anemia with evidence of iron deficiency  -Iron studies: Iron total 100, TIBC 07, %sat 33%, ferritin 19  -He also has MAXX during this admission    #Hx of low grade lymphoma?   - He had a biopsy by Dr. Albaro Ramos which was remarkable for low grade lymphoma, in [facility] about 2 years  - CT shows mutliple enlarged mesenteric and RP LAD which was also present on CT done last year    #Hx of latent TB with +quantiferon     RECOMMENDATIONS:    -Please get records from pt's oncologist with regards to pathology, recent imaging and any treatments.   -Can give IV Venofer as inpatient  -GI evaluation for possible EGD/colonosocpy  -Check retic count, b12, folate, myeloma panel (SPEP, serum IF, immunoglobulin panel, serum FLC), TLS labs (uric acid, CMP, phos, LDH)  -C/w IV hydration for MAXX  -PET scan as o/p.   -

## 2023-05-28 LAB
ANION GAP SERPL CALC-SCNC: 7 MMOL/L — SIGNIFICANT CHANGE UP (ref 7–14)
BUN SERPL-MCNC: 36 MG/DL — HIGH (ref 10–20)
CALCIUM SERPL-MCNC: 7.6 MG/DL — LOW (ref 8.4–10.5)
CHLORIDE SERPL-SCNC: 105 MMOL/L — SIGNIFICANT CHANGE UP (ref 98–110)
CO2 SERPL-SCNC: 21 MMOL/L — SIGNIFICANT CHANGE UP (ref 17–32)
CREAT SERPL-MCNC: 1.7 MG/DL — HIGH (ref 0.7–1.5)
EGFR: 42 ML/MIN/1.73M2 — LOW
GLUCOSE SERPL-MCNC: 188 MG/DL — HIGH (ref 70–99)
HCT VFR BLD CALC: 26.8 % — LOW (ref 42–52)
HGB BLD-MCNC: 8.5 G/DL — LOW (ref 14–18)
MCHC RBC-ENTMCNC: 25.5 PG — LOW (ref 27–31)
MCHC RBC-ENTMCNC: 31.7 G/DL — LOW (ref 32–37)
MCV RBC AUTO: 80.5 FL — SIGNIFICANT CHANGE UP (ref 80–94)
NRBC # BLD: 0 /100 WBCS — SIGNIFICANT CHANGE UP (ref 0–0)
PLATELET # BLD AUTO: 250 K/UL — SIGNIFICANT CHANGE UP (ref 130–400)
PMV BLD: 8.4 FL — SIGNIFICANT CHANGE UP (ref 7.4–10.4)
POTASSIUM SERPL-MCNC: 4.7 MMOL/L — SIGNIFICANT CHANGE UP (ref 3.5–5)
POTASSIUM SERPL-SCNC: 4.7 MMOL/L — SIGNIFICANT CHANGE UP (ref 3.5–5)
PROT SERPL-MCNC: 4.6 G/DL — LOW (ref 6–8.3)
PROT SERPL-MCNC: 4.6 G/DL — LOW (ref 6–8.3)
RBC # BLD: 3.33 M/UL — LOW (ref 4.7–6.1)
RBC # FLD: 19.6 % — HIGH (ref 11.5–14.5)
SODIUM SERPL-SCNC: 133 MMOL/L — LOW (ref 135–146)
TROPONIN T SERPL-MCNC: 0.04 NG/ML — CRITICAL HIGH
WBC # BLD: 6.12 K/UL — SIGNIFICANT CHANGE UP (ref 4.8–10.8)
WBC # FLD AUTO: 6.12 K/UL — SIGNIFICANT CHANGE UP (ref 4.8–10.8)

## 2023-05-28 PROCEDURE — 99233 SBSQ HOSP IP/OBS HIGH 50: CPT

## 2023-05-28 PROCEDURE — 93306 TTE W/DOPPLER COMPLETE: CPT | Mod: 26

## 2023-05-28 RX ORDER — IRON SUCROSE 20 MG/ML
200 INJECTION, SOLUTION INTRAVENOUS EVERY 24 HOURS
Refills: 0 | Status: DISCONTINUED | OUTPATIENT
Start: 2023-05-28 | End: 2023-05-31

## 2023-05-28 RX ORDER — PANTOPRAZOLE SODIUM 20 MG/1
40 TABLET, DELAYED RELEASE ORAL
Refills: 0 | Status: DISCONTINUED | OUTPATIENT
Start: 2023-05-28 | End: 2023-06-07

## 2023-05-28 RX ADMIN — Medication 20 MILLIGRAM(S): at 05:06

## 2023-05-28 RX ADMIN — Medication 325 MILLIGRAM(S): at 05:06

## 2023-05-28 RX ADMIN — Medication 1 MILLIGRAM(S): at 12:28

## 2023-05-28 RX ADMIN — Medication 300 MILLIGRAM(S): at 12:29

## 2023-05-28 RX ADMIN — SIMVASTATIN 40 MILLIGRAM(S): 20 TABLET, FILM COATED ORAL at 21:14

## 2023-05-28 RX ADMIN — IRON SUCROSE 110 MILLIGRAM(S): 20 INJECTION, SOLUTION INTRAVENOUS at 12:28

## 2023-05-28 RX ADMIN — Medication 30 MILLIGRAM(S): at 05:06

## 2023-05-28 NOTE — PROGRESS NOTE ADULT - ASSESSMENT
72 y/o man with PMH of low grade lymphoma, DM type 2, mesenteric lymphangitis dx in 2022 (related to lymphoma and results known to his oncologist), latent TB and on INH, iron deficiency anemia on oral iron and HTN now presents with UE and LE edema and found to have microcytic anemia and MAXX.   previous notes reviewed by me  PMD: Kelsie Lopez    1. 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  pt's wife states that he had EGD/colonoscopy 1 year ago here but I do not see any report in EMR (pt and wife are not good historians - denied h/o lymphoma initially)  rule out blood loss, anemia due to BM suppression, worsening of lymphoma  GI consult appreciated - recommend EGD/colonoscopy as outpt - continue to trend H/H - if dropping, then reconsider as inpt  CT scan of abd/pelvis -noncontrast - no hemorrhage   unable to do CTA due to MAXX  HemOnc consulted - case discussed with the fellow - f/u recommendations - IV iron x 5 days ordered  obtain pathology report from [facility] - biopsy by Dr. Albaro Ramos which was remarkable for low grade lymphoma  check peripheral smear, SPEP, UPEP, f/u serum and urine immunofixation, TLS labs (uric acid, CMP, phos, LDH)  CBC in AM  keep active T&S  transfuse for Hgb <7    2. MAXX - Cr now around 1.7  rule out prerenal - holding ARB, diuretic, encourage PO hydration - pt is eating and drinking  (actually pt received losartan and PO lasix and Cr did not worsen)  renal/bladder US: no hydronephrosis  no hydronephrosis on CT scan  check FeUrea  UA with small blood, trace protein and glucose >500  avoid nephrotoxic meds  daily BMP  renal consulted  consider short course of IV diuresis since pt remains edematous and Na is 133 (likely hypervolemic)    3. UE and LE edema - previous notes reviewed and pt without edema per last admission  on furosemide 20mg daily at home  f/u venous duplex LE - ddimer 235  Albumin 2.4 (stable from last year)  ? due to underlying pathology - doubt CHF but check ECHO (low voltage on EKG and trop 0.06  - repeat)    4. h/o low grade lymphoma  CT scan chest: Multiple prominent subcentimeter lymph nodes within the prevascular, paraaortic and lower paratracheal regions. Additional prominent lymph   nodes within the bilateral axilla.  CT scan abd/pelvis: Multiple enlarged mesenteric lymphadenopathy. Additional enlarged retroperitoneal lymph nodes.  seen by [facility]  obtain pathology report from [facility]    4. Latent TB - on isoniazid - clarify start date of therapy    5. DM type 2 - monitor FS - A1C 5.6    6. DVT prophylaxis - SCD if venous duplex of LE is negative       full code      PROGRESS NOTE HANDOFF    Pending: renal consult, labs in AM, ECHO, f/u results of venous duplex LE, obtain records from pt's oncologist    Family discussion: with daughter Maria Del Carmen today     Disposition: from home   72 y/o man with PMH of low grade lymphoma, DM type 2, mesenteric lymphangitis dx in 2022 (related to lymphoma and results known to his oncologist), latent TB and on INH, iron deficiency anemia on oral iron and HTN now presents with UE and LE edema and found to have microcytic anemia and MAXX.   previous notes reviewed by me  PMD: Kelsie Lopez    1. 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  pt's wife states that he had EGD/colonoscopy 1 year ago here but I do not see any report in EMR (pt and wife are not good historians - denied h/o lymphoma initially)  rule out blood loss, anemia due to BM suppression, worsening of lymphoma  GI consult appreciated - recommend EGD/colonoscopy as outpt - continue to trend H/H - if dropping, then reconsider as inpt  CT scan of abd/pelvis -noncontrast - no hemorrhage   unable to do CTA due to MAXX  HemOnc consulted - case discussed with the fellow - f/u recommendations - IV iron x 5 days ordered  obtain pathology report from [facility] - biopsy by Dr. Albaro Ramos which was remarkable for low grade lymphoma  check peripheral smear, SPEP, UPEP, f/u serum and urine immunofixation, TLS labs (uric acid, CMP, phos, LDH)  CBC in AM  keep active T&S  transfuse for Hgb <7    2. MAXX - Cr now around 1.7  rule out prerenal - holding ARB, diuretic, encourage PO hydration - pt is eating and drinking  (actually pt received losartan and PO lasix and Cr did not worsen)  renal/bladder US: no hydronephrosis  no hydronephrosis on CT scan  check FeUrea  UA with small blood, trace protein and glucose >500  avoid nephrotoxic meds  daily BMP  monitor I's and O's  renal consulted  consider short course of IV diuresis since pt remains edematous and Na is 133 (likely hypervolemic)    3. UE and LE edema - previous notes reviewed and pt without edema per last admission  on furosemide 20mg daily at home  f/u venous duplex LE - ddimer 235  Albumin 2.4 (stable from last year)  ? due to underlying pathology - doubt CHF but check ECHO (low voltage on EKG and trop 0.06  - repeat)    4. h/o low grade lymphoma  CT scan chest: Multiple prominent subcentimeter lymph nodes within the prevascular, paraaortic and lower paratracheal regions. Additional prominent lymph   nodes within the bilateral axilla.  CT scan abd/pelvis: Multiple enlarged mesenteric lymphadenopathy. Additional enlarged retroperitoneal lymph nodes.  seen by [facility]  obtain pathology report from [facility]    4. Latent TB - on isoniazid - clarify start date of therapy    5. DM type 2 - monitor FS - A1C 5.6    6. HTN - on nifedipine XL 30mg daily - would not increase dose since this can worsen peripheral edema (not a new med per chart review)    7. DVT prophylaxis - SCD if venous duplex of LE is negative       full code      PROGRESS NOTE HANDOFF    Pending: renal consult, labs in AM, ECHO, f/u results of venous duplex LE, obtain records from pt's oncologist    Family discussion: with daughter Maria Del Carmen today     Disposition: from home

## 2023-05-29 LAB
ANION GAP SERPL CALC-SCNC: 8 MMOL/L — SIGNIFICANT CHANGE UP (ref 7–14)
BUN SERPL-MCNC: 34 MG/DL — HIGH (ref 10–20)
CALCIUM SERPL-MCNC: 7.9 MG/DL — LOW (ref 8.4–10.5)
CHLORIDE SERPL-SCNC: 111 MMOL/L — HIGH (ref 98–110)
CO2 SERPL-SCNC: 22 MMOL/L — SIGNIFICANT CHANGE UP (ref 17–32)
CREAT SERPL-MCNC: 1.8 MG/DL — HIGH (ref 0.7–1.5)
EGFR: 39 ML/MIN/1.73M2 — LOW
GLUCOSE SERPL-MCNC: 144 MG/DL — HIGH (ref 70–99)
HCT VFR BLD CALC: 27.6 % — LOW (ref 42–52)
HGB BLD-MCNC: 8.6 G/DL — LOW (ref 14–18)
MCHC RBC-ENTMCNC: 25.6 PG — LOW (ref 27–31)
MCHC RBC-ENTMCNC: 31.2 G/DL — LOW (ref 32–37)
MCV RBC AUTO: 82.1 FL — SIGNIFICANT CHANGE UP (ref 80–94)
NRBC # BLD: 0 /100 WBCS — SIGNIFICANT CHANGE UP (ref 0–0)
PHOSPHATE SERPL-MCNC: 3.9 MG/DL — SIGNIFICANT CHANGE UP (ref 2.1–4.9)
PLATELET # BLD AUTO: 242 K/UL — SIGNIFICANT CHANGE UP (ref 130–400)
PMV BLD: 8.6 FL — SIGNIFICANT CHANGE UP (ref 7.4–10.4)
POTASSIUM SERPL-MCNC: 4.9 MMOL/L — SIGNIFICANT CHANGE UP (ref 3.5–5)
POTASSIUM SERPL-SCNC: 4.9 MMOL/L — SIGNIFICANT CHANGE UP (ref 3.5–5)
RBC # BLD: 3.36 M/UL — LOW (ref 4.7–6.1)
RBC # FLD: 19.8 % — HIGH (ref 11.5–14.5)
SODIUM SERPL-SCNC: 141 MMOL/L — SIGNIFICANT CHANGE UP (ref 135–146)
URATE SERPL-MCNC: 7.8 MG/DL — SIGNIFICANT CHANGE UP (ref 3.4–8.8)
WBC # BLD: 6.83 K/UL — SIGNIFICANT CHANGE UP (ref 4.8–10.8)
WBC # FLD AUTO: 6.83 K/UL — SIGNIFICANT CHANGE UP (ref 4.8–10.8)

## 2023-05-29 PROCEDURE — 99233 SBSQ HOSP IP/OBS HIGH 50: CPT

## 2023-05-29 RX ORDER — FUROSEMIDE 40 MG
60 TABLET ORAL DAILY
Refills: 0 | Status: DISCONTINUED | OUTPATIENT
Start: 2023-05-29 | End: 2023-05-31

## 2023-05-29 RX ADMIN — SIMVASTATIN 40 MILLIGRAM(S): 20 TABLET, FILM COATED ORAL at 22:00

## 2023-05-29 RX ADMIN — Medication 60 MILLIGRAM(S): at 17:37

## 2023-05-29 RX ADMIN — IRON SUCROSE 110 MILLIGRAM(S): 20 INJECTION, SOLUTION INTRAVENOUS at 11:03

## 2023-05-29 RX ADMIN — Medication 300 MILLIGRAM(S): at 11:01

## 2023-05-29 RX ADMIN — Medication 30 MILLIGRAM(S): at 05:06

## 2023-05-29 RX ADMIN — Medication 1 MILLIGRAM(S): at 11:01

## 2023-05-29 RX ADMIN — PANTOPRAZOLE SODIUM 40 MILLIGRAM(S): 20 TABLET, DELAYED RELEASE ORAL at 05:05

## 2023-05-29 NOTE — PROGRESS NOTE ADULT - ASSESSMENT
70 y/o man with PMH of low grade lymphoma, DM type 2, mesenteric lymphangitis dx in 2022 (related to lymphoma and results known to his oncologist), latent TB and on INH, iron deficiency anemia on oral iron and HTN now presents with UE and LE edema and found to have microcytic anemia and MAXX.   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  - pt's wife states that he had EGD/colonoscopy 1 year ago here but I do not see any report in EMR (pt and wife are not good historians - denied h/o lymphoma initially)  - rule out blood loss, anemia due to BM suppression, worsening of lymphoma  - GI consult appreciated - recommend EGD/colonoscopy as outpt - continue to trend H/H - if dropping, then reconsider as inpt  - CT scan of abd/pelvis -noncontrast - no hemorrhage   - unable to do CTA due to MAXX  - HemOnc consulted - case discussed with the fellow - f/u recommendations - IV iron x 5 days ordered  - obtain pathology report from [facility] - biopsy by Dr. Albaro Ramos which was remarkable for low grade lymphoma  - check peripheral smear, SPEP, UPEP, f/u serum and urine immunofixation, TLS labs (uric acid, CMP, phos, LDH)  - CBC in AM  - keep active T&S  - transfuse for Hgb <7    # MAXX - Cr now around 1.7  - rule out prerenal - holding ARB, diuretic, encourage PO hydration - pt is eating and drinking  (actually pt received losartan and PO lasix and Cr did not worsen)  - renal/bladder US: no hydronephrosis  - no hydronephrosis on CT scan  - check FeUrea  - UA with small blood, trace protein and glucose >500  - avoid nephrotoxic meds  - daily BMP  - monitor I's and O's  - renal consulted  - consider short course of IV diuresis since pt remains edematous and Na is 133 (likely hypervolemic)    # UE and LE edema - previous notes reviewed and pt without edema per last admission  -on furosemide 20mg daily at home  -f/u venous duplex LE  - ddimer 235  -Albumin 2.4 (stable from last year)  - Echo EF: 59%    # h/o low grade lymphoma  -CT scan chest: Multiple prominent subcentimeter lymph nodes within the prevascular, paraaortic and lower paratracheal regions. Additional prominent lymph nodes within the bilateral axilla.  -CT scan abd/pelvis: Multiple enlarged mesenteric lymphadenopathy. Additional enlarged retroperitoneal lymph nodes.  [facility]  - [facility] report from [facility]    #Latent TB   - on isoniazid - clarify start date of therapy    #DM type 2   - monitor FS - A1C 5.6    #HTN   - on nifedipine XL 30mg daily - would not increase dose since this can worsen peripheral edema (not a new med per chart review)      diet: dash/halal  DVT prophylaxis - SCD if venous duplex of LE is negative   code : full    Pending: renal consult,  f/u results of venous duplex LE, obtain records from pt's oncologist, fu SPEP/UPEP, IF,

## 2023-05-29 NOTE — CONSULT NOTE ADULT - ASSESSMENT
72-year-old male w/ hx of lymphoma, necrotic mesenteric lymphadenitis, HLD, DMA, and SHAI, known to Dr. Iglesias (pcp), presents for Upper and lower b/l extremity swelling    Cr ~ 2 was 0.7 July 2022  UA bland (not much  hematuria/proteinuria)  No  hydro on imaging. Though size discrepency (Lt 12 cm Rt 10) iin setting of "Multiple enlarged mesenteric lymph nodesAdditional enlarged retroperitoneal lymph nodes." seen on CT raises concern for Renal involvment      suggest  - check urine port/cr ratio (UA checks for albuminuria)  - start IV LAsix 60 monitor UOP trend Cr  - Agree wit hholding ARb for now      will follow

## 2023-05-29 NOTE — CONSULT NOTE ADULT - SUBJECTIVE AND OBJECTIVE BOX
Patient is a 72y old  Male who presents with a chief complaint of Extremity Swelling (26 May 2023 16:20)      HPI:  72-year-old male w/ hx of lymphoma, necrotic mesenteric lymphadenitis, HLD, DMA, and SHAI, known to Dr. Iglesias (pcp), presents for Upper and lower b/l extremity swelling. Pt reports that he first noticed symptoms 1 week ago and they have progressively worsened. He denies urinary retention dysuria, hematuria, or any previous events of swelling in the extremities. Denies f,c,n,v,sob,cp,abd pain, diarrhea.    In the Ed, vitals 144/65, HR 82, temp 97.6F; labs s/f hgb 6.3, creat 0.7-->2, BUN 48, CXR nonremarkable, EKG NSR, hemolyzed potassium, other unremarkable labs, UA neg for LE, Nit, positive for glucosuria, INR and PTT within normal limits.  Pt received blood transfusion and is being admitted for further workup of acute anemia in the setting of MAXX.    **[facility] [phone]; opens at 10AM, Please confirm Med Rec** (26 May 2023 04:06)       PAST MEDICAL & SURGICAL HISTORY:  Diabetes mellitus  Hyperlipidemia  Lymphoma  No significant past surgical history      SOCIAL HISTORY:    FAMILY HISTORY:    Allergies    No Known Allergies    Intolerances      ROS:  Negative except for:          Weight (kg): 82.6 (23 @ 12:55)      HOME MEDICATIONS:  Albuterol (Eqv-ProAir HFA) 90 mcg/inh inhalation aerosol: 1 puff(s) inhaled every 6 hours (26 May 2023 12:53)  aspirin 81 mg oral tablet: 1 tab(s) orally once a day (26 May 2023 12:59)  famotidine 40 mg oral tablet: 1 tab(s) orally once a day (at bedtime) (26 May 2023 12:59)  ferrous sulfate 325 mg (65 mg elemental iron) oral tablet: BID (26 May 2023 12:59)  folic acid 1 mg oral tablet: 1 tab(s) orally once a day (26 May 2023 12:59)  isoniazid 300 mg oral tablet: 1 tab(s) orally once a day (26 May 2023 12:58)  Lasix 20 mg oral tablet: 1 tab(s) orally once a day (26 May 2023 12:59)  losartan 25 mg oral tablet: 1 tab(s) orally once a day (26 May 2023 12:59)  pioglitazone 30 mg oral tablet: 1 tab(s) orally once a day (26 May 2023 12:59)  Synjardy 12.5 mg-1000 mg oral tablet: 1 tab(s) orally 2 times a day (26 May 2023 12:59)  Trulicity Pen 1.5 mg/0.5 mL subcutaneous solution:  (26 May 2023 12:59)      Vital Signs Last 24 Hrs  T(C): 36.3 (27 May 2023 05:11), Max: 36.7 (26 May 2023 23:44)  T(F): 97.3 (27 May 2023 05:11), Max: 98 (26 May 2023 23:44)  HR: 79 (27 May 2023 05:11) (78 - 84)  BP: 145/65 (27 May 2023 05:11) (145/65 - 169/75)  BP(mean): --  RR: 18 (27 May 2023 05:11) (18 - 18)  SpO2: 99% (27 May 2023 05:11) (97% - 99%)    Parameters below as of 27 May 2023 05:11  Patient On (Oxygen Delivery Method): room air        PHYSICAL EXAM  General: adult in NAD  HEENT: anicteric sclera, pink conjunctiva  Neck: supple  CV: normal S1/S2 with no murmur rubs or gallops  Lungs: positive air movement b/l ant lungs  Abdomen: soft non-tender non-distended, no hepatosplenomegaly  Ext: no clubbing cyanosis or edema  Skin: no rashes and no petechiae  Neuro: alert and oriented X 4, no focal deficits    MEDICATIONS  (STANDING):  ferrous    sulfate 325 milliGRAM(s) Oral two times a day  folic acid 1 milliGRAM(s) Oral daily  furosemide    Tablet 20 milliGRAM(s) Oral daily  isoniazid 300 milliGRAM(s) Oral daily  losartan 25 milliGRAM(s) Oral daily  NIFEdipine XL 30 milliGRAM(s) Oral daily  simvastatin 40 milliGRAM(s) Oral at bedtime    MEDICATIONS  (PRN):  acetaminophen     Tablet .. 650 milliGRAM(s) Oral every 6 hours PRN Temp greater or equal to 38C (100.4F), Mild Pain (1 - 3)  albuterol    90 MICROgram(s) HFA Inhaler 1 Puff(s) Inhalation every 8 hours PRN Shortness of Breath and/or Wheezing  aluminum hydroxide/magnesium hydroxide/simethicone Suspension 30 milliLiter(s) Oral every 4 hours PRN Dyspepsia  melatonin 3 milliGRAM(s) Oral at bedtime PRN Insomnia  ondansetron Injectable 4 milliGRAM(s) IV Push every 8 hours PRN Nausea and/or Vomiting      LABS:                          9.5    7.48  )-----------( 288      ( 27 May 2023 07:14 )             29.6         Mean Cell Volume : 79.6 fL  Mean Cell Hemoglobin : 25.5 pg  Mean Cell Hemoglobin Concentration : 32.1 g/dL  Auto Neutrophil # : 4.16 K/uL  Auto Lymphocyte # : 2.53 K/uL  Auto Monocyte # : 0.56 K/uL  Auto Eosinophil # : 0.16 K/uL  Auto Basophil # : 0.04 K/uL  Auto Neutrophil % : 55.7 %  Auto Lymphocyte % : 33.8 %  Auto Monocyte % : 7.5 %  Auto Eosinophil % : 2.1 %  Auto Basophil % : 0.5 %      Serial CBC's   @ 07:14  Hct-29.6 / Hgb-9.5 / Plat-288 / RBC-3.72 / WBC-7.48  Serial CBC's   @ 08:00  Hct-28.8 / Hgb-9.3 / Plat-280 / RBC-3.57 / WBC-6.08  Serial CBC's   @ 18:22  Hct-20.6 / Hgb-6.3 / Plat-339 / RBC-2.65 / WBC-7.57          137  |  110  |  44<H>  ----------------------------<  72  4.8   |  19  |  2.0<H>    Ca    8.3<L>      26 May 2023 08:00  Mg     1.8         TPro  4.4<L>  /  Alb  2.3<L>  /  TBili  0.2  /  DBili  x   /  AST  45<H>  /  ALT  15  /  AlkPhos  57        PT/INR - ( 26 May 2023 08:00 )   PT: 11.10 sec;   INR: 0.97 ratio    PTT - ( 26 May 2023 08:00 )  PTT:30.8 sec    Iron - Total Binding Capacity.: 307 ug/dL ( @ 10:32)  Ferritin, Serum: 19 ng/mL ( @ 10:32)  Reticulocyte Percent: 2.0 % ( @ 08:00)      Urinalysis Basic - ( 25 May 2023 21:46 )  Color: Light Yellow / Appearance: Clear / S.012 / pH: x  Gluc: x / Ketone: Negative  / Bili: Negative / Urobili: <2 mg/dL   Blood: x / Protein: Trace / Nitrite: Negative   Leuk Esterase: Negative / RBC: 3 /HPF / WBC 1 /HPF   Sq Epi: x / Non Sq Epi: x / Bacteria: Negative        RADIOLOGY & ADDITIONAL STUDIES:    < from: CT Abdomen and Pelvis No Cont (23 @ 17:29) >  FINDINGS:    LOWER CHEST: Small right and trace left pleural effusions    HEPATOBILIARY: Cholelithiasis.    SPLEEN: Unremarkable.    PANCREAS: Unremarkable.    ADRENAL GLANDS: Unremarkable.    KIDNEYS: No evidence of hydronephrosis. Stable right upper pole 1.7 cm   fat-containing lesion, consistent with angiomyolipoma. Left renal   subcentimeter hypodensities, incompletely characterized.    ABDOMINOPELVIC NODES: Multiple enlarged mesenteric lymph nodes (series 5,   image [id number]). Additional enlarged retroperitoneal lymph nodes.    PELVIC ORGANS: Unremarkable.    PERITONEUM/MESENTERY/BOWEL: No bowel obstruction or intraperitoneal free   air.    BONES/SOFT TISSUES: Multilevel degenerative changes of the thoracolumbar   spine. Intramuscular lipoma within the left medial thigh abductor   musculature. Anasarca. Trace air within the lower abdominal wall   subcutaneous tissues, likely injection related.    VASCULAR: Calcified atherosclerotic disease within the abdominal aorta.      IMPRESSION:    No CT evidence of retroperitoneal hemorrhage.    Multiple enlarged mesenteric lymphadenopathy. Additional enlarged   retroperitoneal lymph nodes.    < from: CT Abdomen and Pelvis w/ IV Cont (22 @ 02:47) >    IMPRESSION:    Long segment ileitis with extensive mesenteric and retroperitoneal   lymphadenopathy (some of which are necrotic) with stranding of mesentery.   Mesenteric lymphadenitis secondary to enteritis, lymphadenitis from   inflammatory etiologies, and lymphoma are considerations, and further   evaluation recommended.    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    < end of copied text >    
NEPHROLOGY CONSULTATION NOTE    72-year-old male w/ hx of lymphoma, necrotic mesenteric lymphadenitis, HLD, DMA, and SHAI, known to Dr. Iglesias (pcp), presents for Upper and lower b/l extremity swelling. Pt reports that he first noticed symptoms 1 week ago and they have progressively worsened. He denies urinary retention dysuria, hematuria, or any previous events of swelling in the extremities. Denies f,c,n,v,sob,cp,abd pain, diarrhea.    Cr ~2 when presented remains stable  in 2022 0.7    UA trace prot neg blood     PAST MEDICAL & SURGICAL HISTORY:  Diabetes mellitus      Hyperlipidemia      Lymphoma      No significant past surgical history        Allergies:  No Known Allergies    Home Medications Reviewed  Hospital Medications:   MEDICATIONS  (STANDING):  folic acid 1 milliGRAM(s) Oral daily  iron sucrose IVPB 200 milliGRAM(s) IV Intermittent every 24 hours  isoniazid 300 milliGRAM(s) Oral daily  NIFEdipine XL 30 milliGRAM(s) Oral daily  pantoprazole    Tablet 40 milliGRAM(s) Oral before breakfast  simvastatin 40 milliGRAM(s) Oral at bedtime      SOCIAL HISTORY:  Denies ETOH,Smoking,   FAMILY HISTORY:        REVIEW OF SYSTEMS:  CONSTITUTIONAL: No weakness, fevers or chills  EYES/ENT: No visual changes;  No vertigo or throat pain   NECK: No pain or stiffness  RESPIRATORY: No cough, wheezing, hemoptysis; No shortness of breath  CARDIOVASCULAR: No chest pain or palpitations.  GASTROINTESTINAL: No abdominal or epigastric pain. No nausea, vomiting, or hematemesis; No diarrhea or constipation. No melena or hematochezia.  GENITOURINARY: No dysuria, frequency, foamy urine, urinary urgency, incontinence or hematuria  NEUROLOGICAL: No numbness or weakness  SKIN: No itching, burning, rashes, or lesions   VASCULAR: ++ bilateral lower extremity edema.   All other review of systems is negative unless indicated above.    VITALS:  T(F): 97.1 (23 @ 07:57), Max: 97.4 (23 @ 15:30)  HR: 85 (23 @ 07:57)  BP: 156/70 (23 @ 07:57)  RR: 18 (23 @ 07:57)  SpO2: 99% (23 @ 05:00)     07: @ 07:00  --------------------------------------------------------  IN: 0 mL / OUT: 1750 mL / NET: -1750 mL     @ 07: @ 14:11  --------------------------------------------------------  IN: 0 mL / OUT: 450 mL / NET: -450 mL            I&O's Detail    28 May 2023 07:  -  29 May 2023 07:00  --------------------------------------------------------  IN:  Total IN: 0 mL    OUT:    Voided (mL): 1750 mL  Total OUT: 1750 mL    Total NET: -1750 mL      29 May 2023 07:  -  29 May 2023 14:11  --------------------------------------------------------  IN:  Total IN: 0 mL    OUT:    Voided (mL): 450 mL  Total OUT: 450 mL    Total NET: -450 mL            PHYSICAL EXAM:  GENERAL: NAD, lying in bed comfortably, receiving blood transfusion  HEAD:  Atraumatic, normocephalic  EYES: EOMI, PERRLA, conjunctiva pale, and sclera clear  ENT: Moist mucous membranes  NECK: Supple, no JVD  HEART: Regular rate and rhythm, no murmurs, rubs, or gallops  LUNGS: Unlabored respirations.  Clear to auscultation bilaterally, no crackles, wheezing, or rhonchi  ABDOMEN: Soft, nontender, nondistended, +BS  EXTREMITIES: b/l upper and lower extremity swelling  NERVOUS SYSTEM:  A&Ox3, no focal deficits   SKIN: No rashes or lesions    LABS:      141  |  111<H>  |  34<H>  ----------------------------<  144<H>  4.9   |  22  |  1.8<H>    Ca    7.9<L>      29 May 2023 06:33  Phos  3.9           Creatinine Trend: 1.8 <--, 1.7 <--, 1.9 <--, 2.0 <--, 2.0 <--                        8.6    6.83  )-----------( 242      ( 29 May 2023 06:33 )             27.6     Urine Studies:  Urinalysis Basic - ( 25 May 2023 21:46 )    Color: Light Yellow / Appearance: Clear / S.012 / pH:   Gluc:  / Ketone: Negative  / Bili: Negative / Urobili: <2 mg/dL   Blood:  / Protein: Trace / Nitrite: Negative   Leuk Esterase: Negative / RBC: 3 /HPF / WBC 1 /HPF   Sq Epi:  / Non Sq Epi:  / Bacteria: Negative      Sodium, Random Urine: 74.0 mmoL/L ( @ 16:56)  Chloride, Random Urine: 71 ( @ 16:56)  Potassium, Random Urine: 22 mmol/L ( @ 16:56)  Creatinine, Random Urine: 32 mg/dL ( @ 16:56)            RADIOLOGY & ADDITIONAL STUDIES:                
Gastroenterology Consultation:    Patient is a 72y old  Male who presents with a chief complaint of Extremity Swelling (27 May 2023 12:40)    Admitted on: 05-25-23    HPI:  72-year-old male w/ hx of lymphoma, necrotic mesenteric lymphadenitis, HLD, DMA, and SHAI, known to Dr. Iglesias (pcp), presents for Upper and lower b/l extremity swelling. Pt reports that he first noticed symptoms 1 week ago and they have progressively worsened. denies any melena, hematochezia, diarrhea, vomiting abdominal pain or dysphagia   Patient was admitted for /e edema workup. Labs on admission significant for anemia. hx of SHAI, received transfusion in ED. GI consulted for anemia       Prior EGD/ Colonoscopy:   no previous EGD or colonoscopy     PAST MEDICAL & SURGICAL HISTORY:  Diabetes mellitus      Hyperlipidemia      Lymphoma      No significant past surgical history            FAMILY HISTORY:  no FH of gi cancers     Social History:  Tobacco: denies   Alcohol: Denies   Drugs: Denies     Home Medications:  Albuterol (Eqv-ProAir HFA) 90 mcg/inh inhalation aerosol: 1 puff(s) inhaled every 6 hours (26 May 2023 12:53)  aspirin 81 mg oral tablet: 1 tab(s) orally once a day (26 May 2023 12:59)  famotidine 40 mg oral tablet: 1 tab(s) orally once a day (at bedtime) (26 May 2023 12:59)  ferrous sulfate 325 mg (65 mg elemental iron) oral tablet: BID (26 May 2023 12:59)  folic acid 1 mg oral tablet: 1 tab(s) orally once a day (26 May 2023 12:59)  isoniazid 300 mg oral tablet: 1 tab(s) orally once a day (26 May 2023 12:58)  Lasix 20 mg oral tablet: 1 tab(s) orally once a day (26 May 2023 12:59)  losartan 25 mg oral tablet: 1 tab(s) orally once a day (26 May 2023 12:59)  pioglitazone 30 mg oral tablet: 1 tab(s) orally once a day (26 May 2023 12:59)  Synjardy 12.5 mg-1000 mg oral tablet: 1 tab(s) orally 2 times a day (26 May 2023 12:59)  Trulicity Pen 1.5 mg/0.5 mL subcutaneous solution:  (26 May 2023 12:59)        MEDICATIONS  (STANDING):  ferrous    sulfate 325 milliGRAM(s) Oral two times a day  folic acid 1 milliGRAM(s) Oral daily  furosemide    Tablet 20 milliGRAM(s) Oral daily  isoniazid 300 milliGRAM(s) Oral daily  NIFEdipine XL 30 milliGRAM(s) Oral daily  simvastatin 40 milliGRAM(s) Oral at bedtime    MEDICATIONS  (PRN):  acetaminophen     Tablet .. 650 milliGRAM(s) Oral every 6 hours PRN Temp greater or equal to 38C (100.4F), Mild Pain (1 - 3)  albuterol    90 MICROgram(s) HFA Inhaler 1 Puff(s) Inhalation every 8 hours PRN Shortness of Breath and/or Wheezing  aluminum hydroxide/magnesium hydroxide/simethicone Suspension 30 milliLiter(s) Oral every 4 hours PRN Dyspepsia  melatonin 3 milliGRAM(s) Oral at bedtime PRN Insomnia  ondansetron Injectable 4 milliGRAM(s) IV Push every 8 hours PRN Nausea and/or Vomiting      Allergies  No Known Allergies      Review of Systems:   Constitutional:  No Fever, No Chills  ENT/Mouth:  No Hearing Changes,  No Difficulty Swallowing  Eyes:  No Eye Pain, No Vision Changes  Cardiovascular:  No Chest Pain, No Palpitations  Respiratory:  No Cough, No Dyspnea  Gastrointestinal:  As described in HPI  Musculoskeletal:  No Joint Swelling, No Back Pain  Skin:  No Skin Lesions, No Jaundice  Neuro:  No Syncope, No Dizziness  Heme/Lymph:  No Bruising, No Bleeding.    Physical Examination:  T(C): 36.3 (05-27-23 @ 05:11), Max: 36.7 (05-26-23 @ 23:44)  HR: 79 (05-27-23 @ 05:11) (78 - 84)  BP: 145/65 (05-27-23 @ 05:11) (145/65 - 169/75)  RR: 18 (05-27-23 @ 05:11) (18 - 18)  SpO2: 99% (05-27-23 @ 05:11) (97% - 99%)          GENERAL: AAOx3, no acute distress.  HEAD:  Atraumatic, Normocephalic  EYES: conjunctiva and sclera clear  NECK: Supple, no JVD or thyromegaly  CHEST/LUNG: Clear to auscultation bilaterally; No wheeze, rhonchi, or rales  HEART: Regular rate and rhythm; normal S1, S2, No murmurs.  ABDOMEN: Soft, nontender, nondistended; Bowel sounds present stacy brown stool   NEUROLOGY: No asterixis or tremor.   SKIN: Intact, no jaundice        Data:                        9.5    7.48  )-----------( 288      ( 27 May 2023 07:14 )             29.6     Hgb Trend:  9.5  05-27-23 @ 07:14  9.3  05-26-23 @ 08:00  6.3  05-25-23 @ 18:22      05-27    132<L>  |  106  |  39<H>  ----------------------------<  86  4.7   |  19  |  1.9<H>    Ca    8.0<L>      27 May 2023 07:14  Mg     1.8     05-27    TPro  4.3<L>  /  Alb  2.3<L>  /  TBili  <0.2  /  DBili  x   /  AST  49<H>  /  ALT  15  /  AlkPhos  53  05-27    Liver panel trend:  TBili <0.2   /   AST 49   /   ALT 15   /   AlkP 53   /   Tptn 4.3   /   Alb 2.3    /   DBili --      05-27  TBili 0.2   /   AST 45   /   ALT 15   /   AlkP 57   /   Tptn 4.4   /   Alb 2.3    /   DBili --      05-26  TBili <0.2   /   AST 52   /   ALT 14   /   AlkP 57   /   Tptn 4.6   /   Alb 2.4    /   DBili --      05-25      PT/INR - ( 26 May 2023 08:00 )   PT: 11.10 sec;   INR: 0.97 ratio         PTT - ( 26 May 2023 08:00 )  PTT:30.8 sec        Radiology:  CT Abdomen and Pelvis No Cont:   ACC: [id number] EXAM:  CT ABDOMEN AND PELVIS   ORDERED BY: LISA HOBBS     PROCEDURE DATE:  05/26/2023          INTERPRETATION:  CLINICAL HISTORY / REASON FOR EXAM: Acute on chronic   anemia.    TECHNIQUE: Contiguous axial CT images were obtained from the lower chest   to the pubic symphysis without intravenous contrast. Oral contrast was   not administered. Reformatted images in the coronal and sagittal planes   were acquired.    COMPARISON CT: CT abdomen and pelvis from June 26, 2022    OTHER STUDIES USED FOR CORRELATION: None.      FINDINGS:    LOWER CHEST: Small right and trace left pleural effusions    HEPATOBILIARY: Cholelithiasis.    SPLEEN: Unremarkable.    PANCREAS: Unremarkable.    ADRENAL GLANDS: Unremarkable.    KIDNEYS: No evidence of hydronephrosis. Stable right upper pole 1.7 cm   fat-containing lesion, consistent with angiomyolipoma. Left renal   subcentimeter hypodensities, incompletely characterized.    ABDOMINOPELVIC NODES: Multiple enlarged mesenteric lymph nodes (series 5,   image [id number]). Additional enlarged retroperitoneal lymph nodes.    PELVIC ORGANS: Unremarkable.    PERITONEUM/MESENTERY/BOWEL: No bowel obstruction or intraperitoneal free   air.    BONES/SOFT TISSUES: Multilevel degenerative changes of the thoracolumbar   spine. Intramuscular lipoma within the left medial thigh abductor   musculature. Anasarca. Trace air within the lower abdominal wall   subcutaneous tissues, likely injection related.    VASCULAR: Calcified atherosclerotic disease within the abdominal aorta.      IMPRESSION:    No CT evidence of retroperitoneal hemorrhage.    Multiple enlarged mesenteric lymphadenopathy. Additional enlarged   retroperitoneal lymph nodes.    --- End of Report ---            ADAMS NEWTON MD; Attending Radiologist  This document has been electronically signed. May 27 2023  5:26AM (05-26-23 @ 17:29)

## 2023-05-29 NOTE — PROGRESS NOTE ADULT - ASSESSMENT
70 y/o man with PMH of low grade lymphoma, DM type 2, mesenteric lymphangitis dx in 2022 (related to lymphoma and results known to his oncologist), latent TB and on INH, iron deficiency anemia on oral iron and HTN now presents with UE and LE edema and found to have microcytic anemia and MAXX.   previous notes reviewed by me      #Microcytic anemia   - now s/p 2 units PRBC and Hgb is improved and stable - pt and wife deny BRBPR or melena  - retic count 2, , haptoglobin WNL  - iron studies WNL but done after blood transfusion  - ferritin 19 -> likely SHAI  - pt's wife states that he had EGD/colonoscopy 1 year ago here but I do not see any report in EMR (pt and wife are not good historians - denied h/o lymphoma initially)  - rule out blood loss, anemia due to BM suppression, worsening of lymphoma  - GI consult appreciated - recommend EGD/colonoscopy as outpt - continue to trend H/H - if dropping, then reconsider as inpt  - CT scan of abd/pelvis -noncontrast - no hemorrhage   - unable to do CTA due to MAXX  - HemOnc consulted - case discussed with the fellow - f/u recommendations - IV iron x 5 days ordered  - obtain pathology report from [facility] - biopsy by Dr. Albaro Ramos which was remarkable for low grade lymphoma  - check peripheral smear, SPEP, UPEP, f/u serum and urine immunofixation, TLS labs (uric acid, CMP, phos, LDH)  - CBC in AM  - keep active T&S  - transfuse for Hgb <7    #MAXX - Cr now around 1.7  rule out prerenal - holding ARB, diuretic, encourage PO hydration - pt is eating and drinking  (actually pt received losartan and PO lasix and Cr did not worsen)  renal/bladder US: no hydronephrosis  no hydronephrosis on CT scan  check FeUrea  UA with small blood, trace protein and glucose >500  avoid nephrotoxic meds  daily BMP  monitor I's and O's  renal consulted  consider short course of IV diuresis since pt remains edematous and Na is 133 (likely hypervolemic)    3. UE and LE edema - previous notes reviewed and pt without edema per last admission  on furosemide 20mg daily at home  f/u venous duplex LE - ddimer 235  Albumin 2.4 (stable from last year)  ? due to underlying pathology - doubt CHF but check ECHO (low voltage on EKG and trop 0.06  - repeat)    4. h/o low grade lymphoma  CT scan chest: Multiple prominent subcentimeter lymph nodes within the prevascular, paraaortic and lower paratracheal regions. Additional prominent lymph   nodes within the bilateral axilla.  CT scan abd/pelvis: Multiple enlarged mesenteric lymphadenopathy. Additional enlarged retroperitoneal lymph nodes.  seen by HemOn  obtain pathology report from [facility]    4. Latent TB - on isoniazid - clarify start date of therapy    5. DM type 2 - monitor FS - A1C 5.6    6. HTN - on nifedipine XL 30mg daily - would not increase dose since this can worsen peripheral edema (not a new med per chart review)    7. DVT prophylaxis - SCD if venous duplex of LE is negative       full code      PROGRESS NOTE HANDOFF    Pending: renal consult, labs in AM, ECHO, f/u results of venous duplex LE, obtain records from pt's oncologist    Family discussion: with daughter Maria Del Carmen today     Disposition: from home

## 2023-05-29 NOTE — PROGRESS NOTE ADULT - ASSESSMENT
70 y/o man with PMH of low grade lymphoma, DM type 2, mesenteric lymphangitis dx in 2022 (related to lymphoma and results known to his oncologist), latent TB and on INH, iron deficiency anemia on oral iron and HTN now presents with UE and LE edema and found to have microcytic anemia and MAXX.   previous notes reviewed by me  PMD: Kelsie Lopez    1. 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  pt's wife states that he had EGD/colonoscopy 1 year ago here but I do not see any report in EMR (pt and wife are not good historians - denied h/o lymphoma initially)  rule out blood loss, anemia due to BM suppression, worsening of lymphoma  GI consult appreciated - recommend EGD/colonoscopy as outpt - continue to trend H/H - if dropping, then reconsider as inpt  CT scan of abd/pelvis -noncontrast - no hemorrhage   unable to do CTA due to MAXX  HemOnc consulted - case discussed with the fellow - f/u recommendations - IV iron x 5 days ordered  obtain pathology report from [facility] - biopsy by Dr. Albaro Ramos which was remarkable for low grade lymphoma  check peripheral smear, SPEP, UPEP, f/u serum and urine immunofixation  uric acid WNL  phosphorus WNL  CBC in AM  keep active T&S  transfuse for Hgb <7    2. MAXX - Cr now around 1.8  rule out prerenal - holding ARB, diuretic, encourage PO hydration - pt is eating and drinking  (actually pt received losartan and PO lasix and Cr did not worsen)  renal/bladder US: no hydronephrosis  no hydronephrosis on CT scan  check FeUrea  UA with small blood, trace protein and glucose >500  avoid nephrotoxic meds  daily BMP  monitor I's and O's  renal consulted - will see pt today  consider short course of IV diuresis since pt remains edematous and monitor BMP, urine output closely    3. UE and LE edema - previous notes reviewed and pt without edema per last admission  on furosemide 20mg daily at home  venous duplex LE negative  ddimer 235  Albumin 2.4 (stable from last year)  ? due to underlying pathology - doubt CHF -  ECHO report reviewed - has grade 1 diastolic dysfunction    4. h/o low grade lymphoma  CT scan chest: Multiple prominent subcentimeter lymph nodes within the prevascular, paraaortic and lower paratracheal regions. Additional prominent lymph   nodes within the bilateral axilla.  CT scan abd/pelvis: Multiple enlarged mesenteric lymphadenopathy. Additional enlarged retroperitoneal lymph nodes.  seen by [facility]  obtain pathology report from [facility]    4. Latent TB - on isoniazid - clarify start date of therapy    5. DM type 2 - monitor FS - A1C 5.6    6. HTN - on nifedipine XL 30mg daily - would not increase dose since this can worsen peripheral edema (not a new med per chart review)    7. Elevated troponin up to 0.06 and trending down - could be elevated due to MAXX  ECHO: EF 59%, grade 1 diastolic dysfunction  EKG reviewed and interpreted by me    8. DVT prophylaxis - SCD ordered  OOB and ambulate  heparin SQ if H/H remains stable      full code      PROGRESS NOTE HANDOFF    Pending: renal consult, labs in AM, obtain records from pt's oncologist    Family discussion: with daughter Maria Del Carmen yesterday [phone]    Disposition: from home

## 2023-05-30 LAB
ALBUMIN SERPL ELPH-MCNC: 2.4 G/DL — LOW (ref 3.5–5.2)
ALP SERPL-CCNC: 64 U/L — SIGNIFICANT CHANGE UP (ref 30–115)
ALT FLD-CCNC: 16 U/L — SIGNIFICANT CHANGE UP (ref 0–41)
ANION GAP SERPL CALC-SCNC: 9 MMOL/L — SIGNIFICANT CHANGE UP (ref 7–14)
AST SERPL-CCNC: 52 U/L — HIGH (ref 0–41)
BASOPHILS # BLD AUTO: 0.05 K/UL — SIGNIFICANT CHANGE UP (ref 0–0.2)
BASOPHILS NFR BLD AUTO: 0.6 % — SIGNIFICANT CHANGE UP (ref 0–1)
BILIRUB SERPL-MCNC: 0.2 MG/DL — SIGNIFICANT CHANGE UP (ref 0.2–1.2)
BLD GP AB SCN SERPL QL: SIGNIFICANT CHANGE UP
BUN SERPL-MCNC: 33 MG/DL — HIGH (ref 10–20)
CALCIUM SERPL-MCNC: 7.9 MG/DL — LOW (ref 8.4–10.5)
CHLORIDE SERPL-SCNC: 107 MMOL/L — SIGNIFICANT CHANGE UP (ref 98–110)
CO2 SERPL-SCNC: 24 MMOL/L — SIGNIFICANT CHANGE UP (ref 17–32)
CREAT ?TM UR-MCNC: 24 MG/DL — SIGNIFICANT CHANGE UP
CREAT SERPL-MCNC: 1.8 MG/DL — HIGH (ref 0.7–1.5)
EGFR: 39 ML/MIN/1.73M2 — LOW
EOSINOPHIL # BLD AUTO: 0.24 K/UL — SIGNIFICANT CHANGE UP (ref 0–0.7)
EOSINOPHIL NFR BLD AUTO: 2.8 % — SIGNIFICANT CHANGE UP (ref 0–8)
GLUCOSE SERPL-MCNC: 118 MG/DL — HIGH (ref 70–99)
HCT VFR BLD CALC: 29.3 % — LOW (ref 42–52)
HGB BLD-MCNC: 9 G/DL — LOW (ref 14–18)
IMM GRANULOCYTES NFR BLD AUTO: 0.5 % — HIGH (ref 0.1–0.3)
KAPPA LC SER QL IFE: 14.49 MG/DL — HIGH (ref 0.33–1.94)
KAPPA/LAMBDA FREE LIGHT CHAIN RATIO, SERUM: 1.01 RATIO — SIGNIFICANT CHANGE UP (ref 0.26–1.65)
LAMBDA LC SER QL IFE: 14.28 MG/DL — HIGH (ref 0.57–2.63)
LDH SERPL L TO P-CCNC: 206 U/L — SIGNIFICANT CHANGE UP (ref 50–242)
LYMPHOCYTES # BLD AUTO: 2.53 K/UL — SIGNIFICANT CHANGE UP (ref 1.2–3.4)
LYMPHOCYTES # BLD AUTO: 29.7 % — SIGNIFICANT CHANGE UP (ref 20.5–51.1)
MAGNESIUM SERPL-MCNC: 1.5 MG/DL — LOW (ref 1.8–2.4)
MCHC RBC-ENTMCNC: 25.4 PG — LOW (ref 27–31)
MCHC RBC-ENTMCNC: 30.7 G/DL — LOW (ref 32–37)
MCV RBC AUTO: 82.5 FL — SIGNIFICANT CHANGE UP (ref 80–94)
MONOCYTES # BLD AUTO: 0.61 K/UL — HIGH (ref 0.1–0.6)
MONOCYTES NFR BLD AUTO: 7.2 % — SIGNIFICANT CHANGE UP (ref 1.7–9.3)
NEUTROPHILS # BLD AUTO: 5.06 K/UL — SIGNIFICANT CHANGE UP (ref 1.4–6.5)
NEUTROPHILS NFR BLD AUTO: 59.2 % — SIGNIFICANT CHANGE UP (ref 42.2–75.2)
NRBC # BLD: 0 /100 WBCS — SIGNIFICANT CHANGE UP (ref 0–0)
PHOSPHATE SERPL-MCNC: 3.6 MG/DL — SIGNIFICANT CHANGE UP (ref 2.1–4.9)
PLATELET # BLD AUTO: 277 K/UL — SIGNIFICANT CHANGE UP (ref 130–400)
PMV BLD: 8.7 FL — SIGNIFICANT CHANGE UP (ref 7.4–10.4)
POTASSIUM SERPL-MCNC: 4.5 MMOL/L — SIGNIFICANT CHANGE UP (ref 3.5–5)
POTASSIUM SERPL-SCNC: 4.5 MMOL/L — SIGNIFICANT CHANGE UP (ref 3.5–5)
PROT ?TM UR-MCNC: 13 MG/DLG/24H — SIGNIFICANT CHANGE UP
PROT SERPL-MCNC: 4.7 G/DL — LOW (ref 6–8)
PROT/CREAT UR-RTO: 0.5 RATIO — HIGH (ref 0–0.2)
RBC # BLD: 3.55 M/UL — LOW (ref 4.7–6.1)
RBC # FLD: 20.1 % — HIGH (ref 11.5–14.5)
SODIUM SERPL-SCNC: 140 MMOL/L — SIGNIFICANT CHANGE UP (ref 135–146)
URATE SERPL-MCNC: 8 MG/DL — SIGNIFICANT CHANGE UP (ref 3.4–8.8)
WBC # BLD: 8.53 K/UL — SIGNIFICANT CHANGE UP (ref 4.8–10.8)
WBC # FLD AUTO: 8.53 K/UL — SIGNIFICANT CHANGE UP (ref 4.8–10.8)

## 2023-05-30 PROCEDURE — 99233 SBSQ HOSP IP/OBS HIGH 50: CPT

## 2023-05-30 RX ORDER — MAGNESIUM SULFATE 500 MG/ML
2 VIAL (ML) INJECTION
Refills: 0 | Status: COMPLETED | OUTPATIENT
Start: 2023-05-30 | End: 2023-05-30

## 2023-05-30 RX ORDER — MAGNESIUM SULFATE 500 MG/ML
2 VIAL (ML) INJECTION ONCE
Refills: 0 | Status: DISCONTINUED | OUTPATIENT
Start: 2023-05-30 | End: 2023-05-30

## 2023-05-30 RX ADMIN — IRON SUCROSE 110 MILLIGRAM(S): 20 INJECTION, SOLUTION INTRAVENOUS at 12:51

## 2023-05-30 RX ADMIN — Medication 60 MILLIGRAM(S): at 05:25

## 2023-05-30 RX ADMIN — PANTOPRAZOLE SODIUM 40 MILLIGRAM(S): 20 TABLET, DELAYED RELEASE ORAL at 05:25

## 2023-05-30 RX ADMIN — SIMVASTATIN 40 MILLIGRAM(S): 20 TABLET, FILM COATED ORAL at 21:33

## 2023-05-30 RX ADMIN — Medication 1 MILLIGRAM(S): at 12:51

## 2023-05-30 RX ADMIN — Medication 30 MILLIGRAM(S): at 05:26

## 2023-05-30 RX ADMIN — Medication 25 GRAM(S): at 15:51

## 2023-05-30 RX ADMIN — Medication 25 GRAM(S): at 17:33

## 2023-05-30 NOTE — PATIENT PROFILE ADULT - NSPROPASSIVESMOKEEXPOSURE_GEN_A_NUR
July 3, 2023       Roselyn Duckworth  [id number]  [place] [address]    Dear Ms. Donita,    Your procedure is scheduled with Maryann Bardales MD on August 15, 2023, at [facility]. The start time of your procedure is tentatively scheduled for 8:45 AM. You can expect to be contacted 1 to 3 days prior to the surgery to confirm arrival and surgery time. Occasionally these times may change.    Please arrive to register for your procedure at 6:45 AM. The address of the facility is:      [address]  [id number] [phone]  Please enter through the main doors near the [facility] and check in at Day Surgery Registration.     Please schedule an appointment to see your primary care physician for a pre operative physical before your scheduled surgery. Please make sure that the appointment falls within 30 days of your surgery date or it will  and be invalid. Failure to keep scheduled appointment may cause a delay or cancellation of your procedure.     The following appointment(s) have been scheduled for you:     Preop exam with Dr. Bardales at the [facility] on 2023 at 3:30 PM    Here are instructions for your surgery:   Do not take any anti-inflammatory medications (aspirin, ibuprofen, naproxen, etc) for 10 days before surgery.  Acetaminophen (Tylenol) is acceptable.    Do not take Plavix, or Coumadin/Wafarin medication for 5 days before surgery.     Do not eat or drink after midnight the night before your surgery.    You will need someone to drive you home and remain with you up to 24 hours after you have been discharged.     Starting 10 days prior to your surgery, please do not take any Phentermine or other diet/weight loss products.  Continuing these medications in the 10 days before surgery will likely result in postponement due to anesthesia requirements.  Please consult with your prescribing physicians if you  have any questions.    If you have any work related and/or disability forms that need to be completed, please bring these forms to:  [facility]. It takes approximately 7 to 10 business days to complete these forms.    If you have any questions or need to reschedule, please contact me at the telephone number and extension listed below.     Thank you,    Romi SCOTT [phone]  Surgery Scheduler for Maryann Bardales MD   [facility] Pre-Admit Department    Enclosures: [facility] Booklet       No

## 2023-05-30 NOTE — PROGRESS NOTE ADULT - ASSESSMENT
72 y/o man with PMH of low grade lymphoma, DM type 2, mesenteric lymphangitis dx in 2022 (related to lymphoma and results known to his oncologist), latent TB and on INH, iron deficiency anemia on oral iron and HTN now presents with UE and LE edema and found to have microcytic anemia and MAXX.   previous notes reviewed by me  PMD: Kelsie Lopez    # Microcytic anemia   - s/p 2 units PRBC and Hgb is improved and stable - pt and wife deny BRBPR or melena  - GI consult appreciated - recommend EGD/colonoscopy as outpt   - continue to trend H/H daily   - CT scan of abd/pelvis -noncontrast - no hemorrhage   - Hem/Onc consullt appreciated - obtain records from previus oncologist  - IV venofer x 5 days   - follow up peripheral smear, SPEP, UPEP, f/u serum and urine immunofixation  - keep active T&S    #MAXX   - baseline cr = 1  - holding nephrotoxics- Cr now around 1.8  - renal/bladder US: no hydronephrosis  - no hydronephrosis on CT scan  - check FeUrea  - daily BMP  - monitor I's & O's  - nephrology following    # UE and LE edema   - venous duplex LE negative  - Albumin 2.4 (stable from last year)  - ECHO = grade 1 diastolic dysfunction  - on Lasix 60mg IV q24 as per renal as of 5/29    # h/o low grade lymphoma  -CT scan chest: Multiple prominent subcentimeter lymph nodes within the prevascular, paraaortic and lower paratracheal regions. Additional prominent lymph   nodes within the bilateral axilla.  -CT scan abd/pelvis: Multiple enlarged mesenteric lymphadenopathy. Additional enlarged retroperitoneal lymph nodes.  seen by [facility]  -obtain pathology report from [facility] - discussed with medical resident     # Latent TB   - on isoniazid    #DM II  - A1C 5.6  - outpatient follow up     #HTN   - on nifedipine XL 30mg daily    Progress Note Handoff  Pending Consults: heme/onc  Pending Tests: labs  Pending Results: labs  Family Discussion: Discussed labs, meds, obtaining old records and overall plan of care with pt and medical staff. All questions answered.   Disposition: Home_____/SNF______/Other_____/Unknown at this time__x___  Spent over 55 min reviewing chart, speaking with patient/family and on coordinating patient care during interdisciplinary rounds

## 2023-05-30 NOTE — PROGRESS NOTE ADULT - ASSESSMENT
72 y/o man with PMH of low grade lymphoma, DM type 2, mesenteric lymphangitis dx in 2022 (related to lymphoma and results known to his oncologist), latent TB and on INH, iron deficiency anemia on oral iron and HTN now presents with UE and LE edema and found to have microcytic anemia and MAXX.   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  - pt's wife states that he had EGD/colonoscopy 1 year ago here but I do not see any report in EMR (pt and wife are not good historians - denied h/o lymphoma initially)  - rule out blood loss, anemia due to BM suppression, worsening of lymphoma  - GI consult appreciated - recommend EGD/colonoscopy as outpt - continue to trend H/H - if dropping, then reconsider as inpt  - CT scan of abd/pelvis -noncontrast ----- no hemorrhage   - unable to do CTA due to MAXX  - HemOnc consulted - case discussed with the fellow - f/u recommendations - IV iron x 5 days ordered  - obtain pathology report from [facility] - biopsy by Dr. Albaro Ramos which was remarkable for low grade lymphoma.. Called the office 05/30. will send me the records via fax. Also called PCP. As per PCP he does not have any new record regarding his lymphoma nad anemia. Only recommended to stop Isoniazid as he was taking it for a long time   - check peripheral smear, SPEP, UPEP, f/u serum and urine immunofixation, TLS labs (uric acid, CMP, phos, LDH)  - CBC in AM  - keep active T&S  - transfuse for Hgb <7    # MAXX - Cr now around 1.7  - Nephrology consulted : cr stable, UA noted / please check pr/ cr ratio / albumin 2.3 due to ? continue lasix . non oliguric ,bp controlled .please check k/l , IF .ph at goal .sat elevated 33 ? need for venofer   - will follow     # UE and LE edema - previous notes reviewed and pt without edema per last admission  -on furosemide 20mg daily at home  -f/u venous duplex LE-- negative   - ddimer 235  -Albumin 2.4 (stable from last year)  - Echo EF: 59%    # h/o low grade lymphoma  -CT scan chest: Multiple prominent subcentimeter lymph nodes within the prevascular, paraaortic and lower paratracheal regions. Additional prominent lymph nodes within the bilateral axilla.  -CT scan abd/pelvis: Multiple enlarged mesenteric lymphadenopathy. Additional enlarged retroperitoneal lymph nodes.  - [place] [facility]  - obtain pathology report from [facility]---  Called the office 05/30. will send me the records via fax    #Latent TB   - on isoniazid - clarify start date of therapy  - Called PCP. as per PCP can stop now. Stopped 05/30    #DM type 2   - monitor FS - A1C 5.6    #HTN   - on nifedipine XL 30mg daily - would not increase dose since this can worsen peripheral edema (not a new med per chart review)      Diet: dash/halal  DVT prophylaxis - SCD   code : full    Pending:  obtain records from pt's oncologist, fu SPEP/UPEP, IF, urine P/C

## 2023-05-30 NOTE — PROGRESS NOTE ADULT - ASSESSMENT
72-year-old male w/ hx of lymphoma, necrotic mesenteric lymphadenitis, HLD, DMA, and SHAI, known to Dr. Iglesias (pcp), presents for Upper and lower b/l extremity swelling  Cr ~ 2 was 0.7 July 2022  cr stable   UA noted / please check pr/ cr ratio / albumin 2.3 due to ?  continue lasix   non oliguric   bp controlled   please check k/l , IF   ph at goal   sat elevated 33 ? need for venofer   will follow

## 2023-05-31 LAB
ALBUMIN SERPL ELPH-MCNC: 2.5 G/DL — LOW (ref 3.5–5.2)
ALP SERPL-CCNC: 61 U/L — SIGNIFICANT CHANGE UP (ref 30–115)
ALT FLD-CCNC: 14 U/L — SIGNIFICANT CHANGE UP (ref 0–41)
ANION GAP SERPL CALC-SCNC: 6 MMOL/L — LOW (ref 7–14)
AST SERPL-CCNC: 46 U/L — HIGH (ref 0–41)
BASOPHILS # BLD AUTO: 0.05 K/UL — SIGNIFICANT CHANGE UP (ref 0–0.2)
BASOPHILS NFR BLD AUTO: 0.6 % — SIGNIFICANT CHANGE UP (ref 0–1)
BILIRUB SERPL-MCNC: 0.2 MG/DL — SIGNIFICANT CHANGE UP (ref 0.2–1.2)
BUN SERPL-MCNC: 34 MG/DL — HIGH (ref 10–20)
CALCIUM SERPL-MCNC: 8.2 MG/DL — LOW (ref 8.4–10.5)
CHLORIDE SERPL-SCNC: 108 MMOL/L — SIGNIFICANT CHANGE UP (ref 98–110)
CO2 SERPL-SCNC: 26 MMOL/L — SIGNIFICANT CHANGE UP (ref 17–32)
CREAT SERPL-MCNC: 2 MG/DL — HIGH (ref 0.7–1.5)
DEPRECATED KAPPA LC FREE/LAMBDA SER: 5.27 RATIO — SIGNIFICANT CHANGE UP (ref 0.7–6.02)
EGFR: 35 ML/MIN/1.73M2 — LOW
EOSINOPHIL # BLD AUTO: 0.19 K/UL — SIGNIFICANT CHANGE UP (ref 0–0.7)
EOSINOPHIL NFR BLD AUTO: 2.4 % — SIGNIFICANT CHANGE UP (ref 0–8)
FOLATE SERPL-MCNC: 13.2 NG/ML — SIGNIFICANT CHANGE UP
GLUCOSE SERPL-MCNC: 103 MG/DL — HIGH (ref 70–99)
HCT VFR BLD CALC: 27 % — LOW (ref 42–52)
HGB BLD-MCNC: 8.4 G/DL — LOW (ref 14–18)
IGA FLD-MCNC: 233 MG/DL — SIGNIFICANT CHANGE UP (ref 84–499)
IGG FLD-MCNC: 783 MG/DL — SIGNIFICANT CHANGE UP (ref 610–1660)
IGM SERPL-MCNC: 15 MG/DL — LOW (ref 35–242)
IMM GRANULOCYTES NFR BLD AUTO: 0.5 % — HIGH (ref 0.1–0.3)
KAPPA LC SER QL IFE: 16.35 MG/DL — HIGH (ref 0.33–1.94)
KAPPA LC SER QL IFE: 16.35 MG/DL — HIGH (ref 0.33–1.94)
KAPPA LC UR-MCNC: 131.1 MG/L — HIGH
KAPPA/LAMBDA FREE LIGHT CHAIN RATIO, SERUM: 1.06 RATIO — SIGNIFICANT CHANGE UP (ref 0.26–1.65)
KAPPA/LAMBDA FREE LIGHT CHAIN RATIO, SERUM: 1.06 RATIO — SIGNIFICANT CHANGE UP (ref 0.26–1.65)
LAMBDA LC SER QL IFE: 15.47 MG/DL — HIGH (ref 0.57–2.63)
LAMBDA LC SER QL IFE: 15.47 MG/DL — HIGH (ref 0.57–2.63)
LAMBDA LC UR-MCNC: 24.86 MG/L — HIGH
LYMPHOCYTES # BLD AUTO: 2.38 K/UL — SIGNIFICANT CHANGE UP (ref 1.2–3.4)
LYMPHOCYTES # BLD AUTO: 29.8 % — SIGNIFICANT CHANGE UP (ref 20.5–51.1)
MAGNESIUM SERPL-MCNC: 1.8 MG/DL — SIGNIFICANT CHANGE UP (ref 1.8–2.4)
MCHC RBC-ENTMCNC: 25.3 PG — LOW (ref 27–31)
MCHC RBC-ENTMCNC: 31.1 G/DL — LOW (ref 32–37)
MCV RBC AUTO: 81.3 FL — SIGNIFICANT CHANGE UP (ref 80–94)
MONOCYTES # BLD AUTO: 0.56 K/UL — SIGNIFICANT CHANGE UP (ref 0.1–0.6)
MONOCYTES NFR BLD AUTO: 7 % — SIGNIFICANT CHANGE UP (ref 1.7–9.3)
NEUTROPHILS # BLD AUTO: 4.76 K/UL — SIGNIFICANT CHANGE UP (ref 1.4–6.5)
NEUTROPHILS NFR BLD AUTO: 59.7 % — SIGNIFICANT CHANGE UP (ref 42.2–75.2)
NRBC # BLD: 0 /100 WBCS — SIGNIFICANT CHANGE UP (ref 0–0)
PLATELET # BLD AUTO: 250 K/UL — SIGNIFICANT CHANGE UP (ref 130–400)
PMV BLD: 8.8 FL — SIGNIFICANT CHANGE UP (ref 7.4–10.4)
POTASSIUM SERPL-MCNC: 4.7 MMOL/L — SIGNIFICANT CHANGE UP (ref 3.5–5)
POTASSIUM SERPL-SCNC: 4.7 MMOL/L — SIGNIFICANT CHANGE UP (ref 3.5–5)
PROT SERPL-MCNC: 4.3 G/DL — LOW (ref 6–8.3)
PROT SERPL-MCNC: 4.3 G/DL — LOW (ref 6–8.3)
PROT SERPL-MCNC: 4.4 G/DL — LOW (ref 6–8)
RBC # BLD: 3.32 M/UL — LOW (ref 4.7–6.1)
RBC # FLD: 19.9 % — HIGH (ref 11.5–14.5)
SODIUM SERPL-SCNC: 140 MMOL/L — SIGNIFICANT CHANGE UP (ref 135–146)
VIT B12 SERPL-MCNC: 295 PG/ML — SIGNIFICANT CHANGE UP (ref 232–1245)
WBC # BLD: 7.98 K/UL — SIGNIFICANT CHANGE UP (ref 4.8–10.8)
WBC # FLD AUTO: 7.98 K/UL — SIGNIFICANT CHANGE UP (ref 4.8–10.8)

## 2023-05-31 PROCEDURE — 99232 SBSQ HOSP IP/OBS MODERATE 35: CPT

## 2023-05-31 RX ORDER — NIFEDIPINE 30 MG
30 TABLET, EXTENDED RELEASE 24 HR ORAL ONCE
Refills: 0 | Status: COMPLETED | OUTPATIENT
Start: 2023-05-31 | End: 2023-05-31

## 2023-05-31 RX ORDER — FUROSEMIDE 40 MG
40 TABLET ORAL DAILY
Refills: 0 | Status: DISCONTINUED | OUTPATIENT
Start: 2023-06-01 | End: 2023-06-02

## 2023-05-31 RX ORDER — NIFEDIPINE 30 MG
60 TABLET, EXTENDED RELEASE 24 HR ORAL DAILY
Refills: 0 | Status: DISCONTINUED | OUTPATIENT
Start: 2023-06-01 | End: 2023-06-07

## 2023-05-31 RX ADMIN — Medication 1 MILLIGRAM(S): at 12:34

## 2023-05-31 RX ADMIN — SIMVASTATIN 40 MILLIGRAM(S): 20 TABLET, FILM COATED ORAL at 21:53

## 2023-05-31 RX ADMIN — Medication 30 MILLIGRAM(S): at 05:23

## 2023-05-31 RX ADMIN — Medication 60 MILLIGRAM(S): at 05:22

## 2023-05-31 RX ADMIN — PANTOPRAZOLE SODIUM 40 MILLIGRAM(S): 20 TABLET, DELAYED RELEASE ORAL at 05:47

## 2023-05-31 RX ADMIN — Medication 30 MILLIGRAM(S): at 12:34

## 2023-05-31 NOTE — PROGRESS NOTE ADULT - ASSESSMENT
70 y/o man with PMH of low grade lymphoma, DM type 2, mesenteric lymphangitis dx in 2022 (related to lymphoma and results known to his oncologist), latent TB and on INH, iron deficiency anemia on oral iron and HTN now presents with UE and LE edema and found to have microcytic anemia and MAXX.   previous notes reviewed by me  PMD: Kelsie Lopez    # Microcytic anemia - iron stores are adequate  - s/p 2 units PRBC and Hgb is improved and stable - pt and wife deny BRBPR or melena  - GI consult appreciated - recommend EGD/colonoscopy as outpt   - continue to trend H/H daily   - CT scan of abd/pelvis -noncontrast - no hemorrhage   - Hem/Onc consullt appreciated - obtain records from previus oncologist  - follow up peripheral smear, SPEP, UPEP, f/u serum and urine immunofixation  - keep active T&S    #MAXX   - baseline cr = 1  - holding nephrotoxics- Cr now around 1.8  - renal/bladder US: no hydronephrosis  - no hydronephrosis on CT scan  - check FeUrea  - daily BMP  - monitor I's & O's  - nephrology following    # UE and LE edema   - venous duplex LE negative  - Albumin 2.4 (stable from last year)  - ECHO = grade 1 diastolic dysfunction  - reduce Lasix 40mg qd    # h/o low grade lymphoma  -CT scan chest: Multiple prominent subcentimeter lymph nodes within the prevascular, paraaortic and lower paratracheal regions. Additional prominent lymph   nodes within the bilateral axilla.  -CT scan abd/pelvis: Multiple enlarged mesenteric lymphadenopathy. Additional enlarged retroperitoneal lymph nodes.  seen by [facility]  -obtain pathology report from [facility] - follow up     # Latent TB   - on isoniazid    #DM II  - A1C 5.6  - outpatient follow up     #HTN   - increase nifedipine XL 60mg daily      Diet: dash/halal  DVT prophylaxis - SCD   code : full    Pending:  obtain records from pt's oncologist, diuresis, creatinine

## 2023-05-31 NOTE — PROGRESS NOTE ADULT - ASSESSMENT
72-year-old male w/ hx of lymphoma, necrotic mesenteric lymphadenitis, HLD, DMA, and SHAI, known to Dr. Iglesias (pcp), presents for Upper and lower b/l extremity swelling  Cr ~ 2 was 0.7 July 2022  cr stable   UA noted / urine pr/cr ratio 0.5g/g  albumin 2.3 due to ?  resume lasix, can change to 40mg po daily  non oliguric   k/l ok, f/u SIFE  ph at goal   will follow

## 2023-05-31 NOTE — PROGRESS NOTE ADULT - ASSESSMENT
72 y/o man with PMH of low grade lymphoma, DM type 2, mesenteric lymphangitis dx in 2022 (related to lymphoma and results known to his oncologist), latent TB and on INH, iron deficiency anemia on oral iron and HTN now presents with UE and LE edema and found to have microcytic anemia and MAXX.   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  - pt's wife states that he had EGD/colonoscopy 1 year ago here but I do not see any report in EMR (pt and wife are not good historians - denied h/o lymphoma initially)  - rule out blood loss, anemia due to BM suppression, worsening of lymphoma  - GI consult appreciated - recommend EGD/colonoscopy as outpt - continue to trend H/H - if dropping, then reconsider as inpt  - CT scan of abd/pelvis -noncontrast ----- no hemorrhage   - unable to do CTA due to MAXX  - HemOnc consulted - case discussed with the fellow - f/u recommendations - IV iron x 5 days ordered  - obtain pathology report from [facility] - biopsy by Dr. Albaro Ramos which was remarkable for low grade lymphoma.. Called the office 05/30. will send me the records via fax. Also called PCP. As per PCP he does not have any new record regarding his lymphoma nad anemia. Only recommended to stop Isoniazid as he was taking it for a long time   - check peripheral smear, SPEP, UPEP, f/u serum and urine immunofixation, TLS labs (uric acid, CMP, phos, LDH)  - CBC in AM  - keep active T&S  - transfuse for Hgb <7    # MAXX - Cr now around 1.7  - Nephrology consulted : cr stable, UA noted / please check pr/ cr ratio / albumin 2.3 due to ? continue lasix . non oliguric ,bp controlled .please check k/l , IF .ph at goal .sat elevated 33 ? need for venofer   - will follow     # UE and LE edema - previous notes reviewed and pt without edema per last admission  -on furosemide 20mg daily at home. started on 60mg IV Lasix here   -f/u venous duplex LE-- negative   - ddimer 235  - Albumin 2.4 (stable from last year)  - Echo EF: 59%    # h/o low grade lymphoma  -CT scan chest: Multiple prominent subcentimeter lymph nodes within the prevascular, paraaortic and lower paratracheal regions. Additional prominent lymph nodes within the bilateral axilla.  -CT scan abd/pelvis: Multiple enlarged mesenteric lymphadenopathy. Additional enlarged retroperitoneal lymph nodes.  - seen by [facility]  - obtain pathology report from [facility]---  Called the office 05/30. will send me the records via fax    #Latent TB   - on isoniazid - clarify start date of therapy  - Called PCP. as per PCP can stop now. Stopped 05/30    #DM type 2   - monitor FS - A1C 5.6    #HTN   - on nifedipine XL 30mg daily - would not increase dose since this can worsen peripheral edema (not a new med per chart review)      Diet: dash/halal  DVT prophylaxis - SCD   code : full    Pending:  obtain records from pt's oncologist, fu SPEP/UPEP, IF,

## 2023-06-01 LAB
% ALBUMIN: 48.1 % — SIGNIFICANT CHANGE UP
% ALBUMIN: SIGNIFICANT CHANGE UP %
% ALPHA 1: 6.6 % — SIGNIFICANT CHANGE UP
% ALPHA 1: SIGNIFICANT CHANGE UP %
% ALPHA 2: 12.8 % — SIGNIFICANT CHANGE UP
% ALPHA 2: SIGNIFICANT CHANGE UP %
% BETA: 23.6 % — SIGNIFICANT CHANGE UP
% BETA: SIGNIFICANT CHANGE UP %
% GAMMA: 8.9 % — SIGNIFICANT CHANGE UP
% GAMMA: SIGNIFICANT CHANGE UP %
% M SPIKE: 3.9 % — SIGNIFICANT CHANGE UP
ALBUMIN SERPL ELPH-MCNC: 2.2 G/DL — LOW (ref 3.6–5.5)
ALBUMIN SERPL ELPH-MCNC: 2.4 G/DL — LOW (ref 3.5–5.2)
ALBUMIN SERPL ELPH-MCNC: SIGNIFICANT CHANGE UP G/DL (ref 3.6–5.5)
ALBUMIN/GLOB SERPL ELPH: 0.9 RATIO — SIGNIFICANT CHANGE UP
ALP SERPL-CCNC: 86 U/L — SIGNIFICANT CHANGE UP (ref 30–115)
ALPHA1 GLOB SERPL ELPH-MCNC: 0.3 G/DL — SIGNIFICANT CHANGE UP (ref 0.1–0.4)
ALPHA1 GLOB SERPL ELPH-MCNC: SIGNIFICANT CHANGE UP G/DL (ref 0.1–0.4)
ALPHA2 GLOB SERPL ELPH-MCNC: 0.6 G/DL — SIGNIFICANT CHANGE UP (ref 0.5–1)
ALPHA2 GLOB SERPL ELPH-MCNC: SIGNIFICANT CHANGE UP G/DL (ref 0.5–1)
ALT FLD-CCNC: 16 U/L — SIGNIFICANT CHANGE UP (ref 0–41)
ANION GAP SERPL CALC-SCNC: 10 MMOL/L — SIGNIFICANT CHANGE UP (ref 7–14)
ANION GAP SERPL CALC-SCNC: 11 MMOL/L — SIGNIFICANT CHANGE UP (ref 7–14)
AST SERPL-CCNC: 55 U/L — HIGH (ref 0–41)
B-GLOBULIN SERPL ELPH-MCNC: 1.1 G/DL — HIGH (ref 0.5–1)
B-GLOBULIN SERPL ELPH-MCNC: SIGNIFICANT CHANGE UP G/DL (ref 0.5–1)
BASOPHILS # BLD AUTO: 0.07 K/UL — SIGNIFICANT CHANGE UP (ref 0–0.2)
BASOPHILS NFR BLD AUTO: 0.5 % — SIGNIFICANT CHANGE UP (ref 0–1)
BILIRUB SERPL-MCNC: 0.3 MG/DL — SIGNIFICANT CHANGE UP (ref 0.2–1.2)
BLD GP AB SCN SERPL QL: SIGNIFICANT CHANGE UP
BUN SERPL-MCNC: 41 MG/DL — HIGH (ref 10–20)
BUN SERPL-MCNC: 41 MG/DL — HIGH (ref 10–20)
CALCIUM SERPL-MCNC: 8.1 MG/DL — LOW (ref 8.4–10.5)
CALCIUM SERPL-MCNC: 8.2 MG/DL — LOW (ref 8.4–10.4)
CHLORIDE SERPL-SCNC: 105 MMOL/L — SIGNIFICANT CHANGE UP (ref 98–110)
CHLORIDE SERPL-SCNC: 106 MMOL/L — SIGNIFICANT CHANGE UP (ref 98–110)
CO2 SERPL-SCNC: 23 MMOL/L — SIGNIFICANT CHANGE UP (ref 17–32)
CO2 SERPL-SCNC: 25 MMOL/L — SIGNIFICANT CHANGE UP (ref 17–32)
CREAT SERPL-MCNC: 2.1 MG/DL — HIGH (ref 0.7–1.5)
CREAT SERPL-MCNC: 2.2 MG/DL — HIGH (ref 0.7–1.5)
EGFR: 31 ML/MIN/1.73M2 — LOW
EGFR: 33 ML/MIN/1.73M2 — LOW
EOSINOPHIL # BLD AUTO: 0.23 K/UL — SIGNIFICANT CHANGE UP (ref 0–0.7)
EOSINOPHIL NFR BLD AUTO: 1.7 % — SIGNIFICANT CHANGE UP (ref 0–8)
GAMMA GLOBULIN: 0.4 G/DL — LOW (ref 0.6–1.6)
GAMMA GLOBULIN: SIGNIFICANT CHANGE UP G/DL (ref 0.6–1.6)
GLUCOSE BLDC GLUCOMTR-MCNC: 160 MG/DL — HIGH (ref 70–99)
GLUCOSE BLDC GLUCOMTR-MCNC: 185 MG/DL — HIGH (ref 70–99)
GLUCOSE SERPL-MCNC: 129 MG/DL — HIGH (ref 70–99)
GLUCOSE SERPL-MCNC: 205 MG/DL — HIGH (ref 70–99)
HCT VFR BLD CALC: 28.1 % — LOW (ref 42–52)
HGB BLD-MCNC: 8.9 G/DL — LOW (ref 14–18)
IMM GRANULOCYTES NFR BLD AUTO: 0.3 % — SIGNIFICANT CHANGE UP (ref 0.1–0.3)
INTERPRETATION SERPL IFE-IMP: SIGNIFICANT CHANGE UP
INTERPRETATION SERPL IFE-IMP: SIGNIFICANT CHANGE UP
LYMPHOCYTES # BLD AUTO: 31.5 % — SIGNIFICANT CHANGE UP (ref 20.5–51.1)
LYMPHOCYTES # BLD AUTO: 4.28 K/UL — HIGH (ref 1.2–3.4)
M-SPIKE: 0.2 G/DL — HIGH (ref 0–0)
MAGNESIUM SERPL-MCNC: 1.7 MG/DL — LOW (ref 1.8–2.4)
MCHC RBC-ENTMCNC: 25.9 PG — LOW (ref 27–31)
MCHC RBC-ENTMCNC: 31.7 G/DL — LOW (ref 32–37)
MCV RBC AUTO: 81.9 FL — SIGNIFICANT CHANGE UP (ref 80–94)
MONOCYTES # BLD AUTO: 0.71 K/UL — HIGH (ref 0.1–0.6)
MONOCYTES NFR BLD AUTO: 5.2 % — SIGNIFICANT CHANGE UP (ref 1.7–9.3)
NEUTROPHILS # BLD AUTO: 8.24 K/UL — HIGH (ref 1.4–6.5)
NEUTROPHILS NFR BLD AUTO: 60.8 % — SIGNIFICANT CHANGE UP (ref 42.2–75.2)
NRBC # BLD: 0 /100 WBCS — SIGNIFICANT CHANGE UP (ref 0–0)
PLATELET # BLD AUTO: 271 K/UL — SIGNIFICANT CHANGE UP (ref 130–400)
PMV BLD: 8.7 FL — SIGNIFICANT CHANGE UP (ref 7.4–10.4)
POTASSIUM SERPL-MCNC: 4.6 MMOL/L — SIGNIFICANT CHANGE UP (ref 3.5–5)
POTASSIUM SERPL-MCNC: 4.8 MMOL/L — SIGNIFICANT CHANGE UP (ref 3.5–5)
POTASSIUM SERPL-SCNC: 4.6 MMOL/L — SIGNIFICANT CHANGE UP (ref 3.5–5)
POTASSIUM SERPL-SCNC: 4.8 MMOL/L — SIGNIFICANT CHANGE UP (ref 3.5–5)
PROT ?TM UR-MCNC: 20 MG/DL — HIGH (ref 0–12)
PROT PATTERN SERPL ELPH-IMP: SIGNIFICANT CHANGE UP
PROT PATTERN SERPL ELPH-IMP: SIGNIFICANT CHANGE UP
PROT SERPL-MCNC: 4.8 G/DL — LOW (ref 6–8)
RBC # BLD: 3.43 M/UL — LOW (ref 4.7–6.1)
RBC # FLD: 20.1 % — HIGH (ref 11.5–14.5)
SODIUM SERPL-SCNC: 140 MMOL/L — SIGNIFICANT CHANGE UP (ref 135–146)
SODIUM SERPL-SCNC: 140 MMOL/L — SIGNIFICANT CHANGE UP (ref 135–146)
WBC # BLD: 13.57 K/UL — HIGH (ref 4.8–10.8)
WBC # FLD AUTO: 13.57 K/UL — HIGH (ref 4.8–10.8)

## 2023-06-01 PROCEDURE — 99232 SBSQ HOSP IP/OBS MODERATE 35: CPT

## 2023-06-01 RX ORDER — MAGNESIUM SULFATE 500 MG/ML
2 VIAL (ML) INJECTION
Refills: 0 | Status: DISCONTINUED | OUTPATIENT
Start: 2023-06-01 | End: 2023-06-04

## 2023-06-01 RX ADMIN — Medication 40 MILLIGRAM(S): at 05:37

## 2023-06-01 RX ADMIN — SIMVASTATIN 40 MILLIGRAM(S): 20 TABLET, FILM COATED ORAL at 22:23

## 2023-06-01 RX ADMIN — Medication 25 GRAM(S): at 12:00

## 2023-06-01 RX ADMIN — PANTOPRAZOLE SODIUM 40 MILLIGRAM(S): 20 TABLET, DELAYED RELEASE ORAL at 08:19

## 2023-06-01 RX ADMIN — Medication 1 MILLIGRAM(S): at 12:00

## 2023-06-01 RX ADMIN — Medication 60 MILLIGRAM(S): at 05:36

## 2023-06-01 NOTE — PROGRESS NOTE ADULT - ASSESSMENT
70 y/o man with PMH of low grade lymphoma, DM type 2, mesenteric lymphangitis dx in 2022 (related to lymphoma and results known to his oncologist), latent TB and on INH, iron deficiency anemia on oral iron and HTN now presents with UE and LE edema and found to have microcytic anemia and MAXX.   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  - pt's wife states that he had EGD/colonoscopy 1 year ago here but I do not see any report in EMR (pt and wife are not good historians - denied h/o lymphoma initially)  - rule out blood loss, anemia due to BM suppression, worsening of lymphoma  - GI consult appreciated - recommend EGD/colonoscopy as outpt - continue to trend H/H - if dropping, then reconsider as inpt  - CT scan of abd/pelvis -noncontrast ----- no hemorrhage   - unable to do CTA due to MAXX  - HemOnc consulted - case discussed with the fellow - f/u recommendations - IV iron x 5 days ordered  - obtain pathology report from [facility] - biopsy by Dr. Albaro Ramos which was remarkable for low grade lymphoma.. Called the office 05/30. will send me the records via fax. Also called PCP. As per PCP he does not have any new record regarding his lymphoma nad anemia. Only recommended to stop Isoniazid as he was taking it for a long time   - check peripheral smear, SPEP, UPEP, f/u serum and urine immunofixation, TLS labs (uric acid, CMP, phos, LDH)  - CBC in AM  - keep active T&S  - transfuse for Hgb <7    # MAXX - Cr now around 1.7  - Nephrology consulted : cr stable, UA noted / please check pr/ cr ratio / albumin 2.3 due to ? continue lasix . non oliguric ,bp controlled .please check k/l , IF .ph at goal .sat elevated 33 ? need for venofer   - will follow     # UE and LE edema - previous notes reviewed and pt without edema per last admission  -on furosemide 20mg daily at home. started on 60mg IV Lasix here   -f/u venous duplex LE-- negative   - ddimer 235  - Albumin 2.4 (stable from last year)  - Echo EF: 59%    # h/o low grade lymphoma  -CT scan chest: Multiple prominent subcentimeter lymph nodes within the prevascular, paraaortic and lower paratracheal regions. Additional prominent lymph nodes within the bilateral axilla.  -CT scan abd/pelvis: Multiple enlarged mesenteric lymphadenopathy. Additional enlarged retroperitoneal lymph nodes.  - seen by [facility]  - obtain pathology report from [facility]---  Called the office 05/30. will send me the records via fax    #Latent TB   - on isoniazid - clarify start date of therapy  - Called PCP. as per PCP can stop now. Stopped 05/30    #DM type 2   - monitor FS - A1C 5.6    #HTN   - on nifedipine XL 30mg daily - would not increase dose since this can worsen peripheral edema (not a new med per chart review)      Diet: dash/halal  DVT prophylaxis - SCD   code : full    Pending:  obtain records from pt's oncologist, fu SPEP/UPEP, IF 70 y/o man with PMH of low grade lymphoma, DM type 2, mesenteric lymphangitis dx in 2022 (related to lymphoma and results known to his oncologist), latent TB and on INH, iron deficiency anemia on oral iron and HTN now presents with UE and LE edema and found to have microcytic anemia and MAXX.   previous notes reviewed by me  PMD: Kelsie Cooper...     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  - pt's wife states that he had EGD/colonoscopy 1 year ago here but I do not see any report in EMR (pt and wife are not good historians - denied h/o lymphoma initially)  - rule out blood loss, anemia due to BM suppression, worsening of lymphoma  - GI consult appreciated - recommend EGD/colonoscopy as outpt - continue to trend H/H - if dropping, then reconsider as inpt  - CT scan of abd/pelvis -noncontrast ----- no hemorrhage   - unable to do CTA due to MAXX  - HemOnc consulted - case discussed with the fellow - f/u recommendations - IV iron x 5 days ordered  - obtain pathology report from [facility] - biopsy by Dr. Albaro Ramos which was remarkable for low grade lymphoma.. Called the office 05/30. will send me the records via fax. Also called PCP. As per PCP he does not have any new record regarding his lymphoma nad anemia. Only recommended to stop Isoniazid as he was taking it for a long time. Recalled patient oncologist office [phone] , will send the record today 06/01/23  - check peripheral smear, SPEP, UPEP, f/u serum and urine immunofixation, TLS labs (uric acid, CMP, phos, LDH)  - CBC in AM  - keep active T&S  - transfuse for Hgb <7    # MAXX - Cr now around 1.7  - Nephrology consulted : cr stable, UA noted / please check pr/ cr ratio / albumin 2.3 due to ? continue lasix . non oliguric ,bp controlled .please check k/l , IF .ph at goal .sat elevated 33 ? need for venofer   - will follow     # UE and LE edema - previous notes reviewed and pt without edema per last admission  -on furosemide 20mg daily at home. started on 60mg IV Lasix here. Called pt pcp dr cooper. As per pcp, pt is taking lasix as it was prescribed by his cardio dr. Patient does not know that he has a cardio dr. PCP is unaware if pt have any hx of heart failure.   -f/u venous duplex LE-- negative   - ddimer 235  -   - Albumin 2.4 (stable from last year)  - Echo EF: 59%. No HF     # h/o low grade lymphoma  -CT scan chest: Multiple prominent subcentimeter lymph nodes within the prevascular, paraaortic and lower paratracheal regions. Additional prominent lymph nodes within the bilateral axilla.  -CT scan abd/pelvis: Multiple enlarged mesenteric lymphadenopathy. Additional enlarged retroperitoneal lymph nodes.  - seen by [facility]  - obtain pathology report from [facility]---  Called the office 05/30. will send me the records via fax    #Latent TB   - on isoniazid - clarify start date of therapy  - Called PCP. as per PCP can stop now. Stopped 05/30    #DM type 2   - monitor FS - A1C 5.6    #HTN   - on nifedipine XL 30mg daily - would not increase dose since this can worsen peripheral edema (not a new med per chart review)      Diet: dash/halal  DVT prophylaxis - SCD   code : full    Pending:  obtain records from pt's oncologist, fu SPEP/UPEP, IF

## 2023-06-01 NOTE — PROGRESS NOTE ADULT - ASSESSMENT
72-year-old male w/ hx of lymphoma, necrotic mesenteric lymphadenitis, HLD, DMA, and SHAI, known to Dr. Iglesias (pcp), presents for Upper and lower b/l extremity swelling  Cr ~ 2 was 0.7 July 2022  cr noted / check repeat today   UA noted / urine pr/cr ratio 0.5g/g  albumin 2.5 due to ?  continue lasix   non oliguric   BP well controlled   k/l ok, f/u SIFE  ph at goal   will follow

## 2023-06-01 NOTE — PROGRESS NOTE ADULT - ASSESSMENT
72 y/o man with PMH of low grade lymphoma, DM type 2, mesenteric lymphangitis dx in 2022 (related to lymphoma and results known to his oncologist), latent TB and on INH, iron deficiency anemia on oral iron and HTN now presents with UE and LE edema and found to have microcytic anemia and MAXX.   previous notes reviewed by me  PMD: Kelsie Lopez    # Microcytic anemia - iron stores are adequate  - s/p 2 units PRBC and Hgb is improved and stable - pt and wife deny BRBPR or melena  - GI consult appreciated - recommend EGD/colonoscopy as outpt   - continue to trend H/H daily   - CT scan of abd/pelvis -noncontrast - no hemorrhage   - Hem/Onc consullt appreciated - obtain records from previus oncologist  - keep active T&S    #MAXX   - baseline cr = 1-->2.1  - holding nephrotoxics- Cr now around 2.1  - renal/bladder US: no hydronephrosis  - no hydronephrosis on CT scan  - check FeUrea  - daily BMP  - monitor I's & O's  - nephrology following    # UE and LE edema - improving   - venous duplex LE negative  - Albumin 2.4 (stable from last year)  - ECHO = grade 1 diastolic dysfunction  - continue Lasix 40mg qd    # h/o low grade lymphoma  -CT scan chest: Multiple prominent subcentimeter lymph nodes within the prevascular, paraaortic and lower paratracheal regions. Additional prominent lymph   nodes within the bilateral axilla.  -CT scan abd/pelvis: Multiple enlarged mesenteric lymphadenopathy. Additional enlarged retroperitoneal lymph nodes.  seen by [facility]  -obtain pathology report from [facility] - follow up     # Latent TB   - on isoniazid    #DM II  - A1C 5.6  - outpatient follow up     #HTN   - increase nifedipine XL 60mg daily      Diet: dash/halal  DVT prophylaxis - SCD   code : full    Pending:  obtain records from pt's oncologist, diuresis, creatinine

## 2023-06-02 LAB
ALBUMIN SERPL ELPH-MCNC: 2.7 G/DL — LOW (ref 3.5–5.2)
ALP SERPL-CCNC: 100 U/L — SIGNIFICANT CHANGE UP (ref 30–115)
ALT FLD-CCNC: 16 U/L — SIGNIFICANT CHANGE UP (ref 0–41)
ANION GAP SERPL CALC-SCNC: 11 MMOL/L — SIGNIFICANT CHANGE UP (ref 7–14)
AST SERPL-CCNC: 54 U/L — HIGH (ref 0–41)
BASOPHILS # BLD AUTO: 0.06 K/UL — SIGNIFICANT CHANGE UP (ref 0–0.2)
BASOPHILS NFR BLD AUTO: 0.6 % — SIGNIFICANT CHANGE UP (ref 0–1)
BILIRUB SERPL-MCNC: 0.3 MG/DL — SIGNIFICANT CHANGE UP (ref 0.2–1.2)
BUN SERPL-MCNC: 41 MG/DL — HIGH (ref 10–20)
CALCIUM SERPL-MCNC: 8.3 MG/DL — LOW (ref 8.4–10.4)
CHLORIDE SERPL-SCNC: 107 MMOL/L — SIGNIFICANT CHANGE UP (ref 98–110)
CO2 SERPL-SCNC: 25 MMOL/L — SIGNIFICANT CHANGE UP (ref 17–32)
CREAT SERPL-MCNC: 2.1 MG/DL — HIGH (ref 0.7–1.5)
EGFR: 33 ML/MIN/1.73M2 — LOW
EOSINOPHIL # BLD AUTO: 0.25 K/UL — SIGNIFICANT CHANGE UP (ref 0–0.7)
EOSINOPHIL NFR BLD AUTO: 2.6 % — SIGNIFICANT CHANGE UP (ref 0–8)
GLUCOSE SERPL-MCNC: 114 MG/DL — HIGH (ref 70–99)
HCT VFR BLD CALC: 26.2 % — LOW (ref 42–52)
HGB BLD-MCNC: 8.2 G/DL — LOW (ref 14–18)
IMM GRANULOCYTES NFR BLD AUTO: 0.3 % — SIGNIFICANT CHANGE UP (ref 0.1–0.3)
LYMPHOCYTES # BLD AUTO: 3.57 K/UL — HIGH (ref 1.2–3.4)
LYMPHOCYTES # BLD AUTO: 36.4 % — SIGNIFICANT CHANGE UP (ref 20.5–51.1)
MAGNESIUM SERPL-MCNC: 1.8 MG/DL — SIGNIFICANT CHANGE UP (ref 1.8–2.4)
MCHC RBC-ENTMCNC: 25.6 PG — LOW (ref 27–31)
MCHC RBC-ENTMCNC: 31.3 G/DL — LOW (ref 32–37)
MCV RBC AUTO: 81.9 FL — SIGNIFICANT CHANGE UP (ref 80–94)
MONOCYTES # BLD AUTO: 0.63 K/UL — HIGH (ref 0.1–0.6)
MONOCYTES NFR BLD AUTO: 6.4 % — SIGNIFICANT CHANGE UP (ref 1.7–9.3)
NEUTROPHILS # BLD AUTO: 5.26 K/UL — SIGNIFICANT CHANGE UP (ref 1.4–6.5)
NEUTROPHILS NFR BLD AUTO: 53.7 % — SIGNIFICANT CHANGE UP (ref 42.2–75.2)
NRBC # BLD: 0 /100 WBCS — SIGNIFICANT CHANGE UP (ref 0–0)
PHOSPHATE SERPL-MCNC: 3.4 MG/DL — SIGNIFICANT CHANGE UP (ref 2.1–4.9)
PLATELET # BLD AUTO: 289 K/UL — SIGNIFICANT CHANGE UP (ref 130–400)
PMV BLD: 9.2 FL — SIGNIFICANT CHANGE UP (ref 7.4–10.4)
POTASSIUM SERPL-MCNC: 4.5 MMOL/L — SIGNIFICANT CHANGE UP (ref 3.5–5)
POTASSIUM SERPL-SCNC: 4.5 MMOL/L — SIGNIFICANT CHANGE UP (ref 3.5–5)
PROT SERPL-MCNC: 4.9 G/DL — LOW (ref 6–8)
RBC # BLD: 3.2 M/UL — LOW (ref 4.7–6.1)
RBC # FLD: 20.1 % — HIGH (ref 11.5–14.5)
SODIUM SERPL-SCNC: 143 MMOL/L — SIGNIFICANT CHANGE UP (ref 135–146)
WBC # BLD: 9.8 K/UL — SIGNIFICANT CHANGE UP (ref 4.8–10.8)
WBC # FLD AUTO: 9.8 K/UL — SIGNIFICANT CHANGE UP (ref 4.8–10.8)

## 2023-06-02 PROCEDURE — 99232 SBSQ HOSP IP/OBS MODERATE 35: CPT

## 2023-06-02 RX ORDER — FUROSEMIDE 40 MG
40 TABLET ORAL DAILY
Refills: 0 | Status: DISCONTINUED | OUTPATIENT
Start: 2023-06-03 | End: 2023-06-05

## 2023-06-02 RX ORDER — FUROSEMIDE 40 MG
40 TABLET ORAL ONCE
Refills: 0 | Status: COMPLETED | OUTPATIENT
Start: 2023-06-02 | End: 2023-06-02

## 2023-06-02 RX ADMIN — PANTOPRAZOLE SODIUM 40 MILLIGRAM(S): 20 TABLET, DELAYED RELEASE ORAL at 07:10

## 2023-06-02 RX ADMIN — Medication 40 MILLIGRAM(S): at 07:11

## 2023-06-02 RX ADMIN — Medication 1 MILLIGRAM(S): at 12:04

## 2023-06-02 RX ADMIN — Medication 40 MILLIGRAM(S): at 12:04

## 2023-06-02 RX ADMIN — SIMVASTATIN 40 MILLIGRAM(S): 20 TABLET, FILM COATED ORAL at 21:36

## 2023-06-02 RX ADMIN — Medication 60 MILLIGRAM(S): at 07:11

## 2023-06-02 NOTE — PROGRESS NOTE ADULT - ASSESSMENT
70 y/o man with PMH of low grade lymphoma, DM type 2, mesenteric lymphangitis dx in 2022 (related to lymphoma and results known to his oncologist), latent TB and on INH, iron deficiency anemia on oral iron and HTN now presents with UE and LE edema and found to have microcytic anemia and MAXX.   previous notes reviewed by me  PMD: Kelsie Lopez    # Microcytic anemia - iron stores are adequate  - s/p 2 units PRBC and Hgb is improved and stable - pt and wife deny BRBPR or melena  - GI consult appreciated - recommend EGD/colonoscopy as outpt   - continue to trend H/H daily   - CT scan of abd/pelvis and chest  -noncontrast - significant lymphadenopathy   - Hem/Onc consullt appreciated - obtain records from previus oncologist  - keep active T&S    #MAXX   - baseline cr = 1-->2.1  - holding nephrotoxics- Cr now around 2.1  - renal/bladder US: no hydronephrosis  - no hydronephrosis on CT scan  - check FeUrea  - daily BMP  - monitor I's & O's  - nephrology following    # UE and LE edema - improving   - venous duplex LE negative  - Albumin 2.4 (stable from last year)  - ECHO = grade 1 diastolic dysfunction  - increased to IV lasix 40mg 6/2    # h/o low grade lymphoma  -CT scan chest: Multiple prominent subcentimeter lymph nodes within the prevascular, paraaortic and lower paratracheal regions. Additional prominent lymph   nodes within the bilateral axilla.  -CT scan abd/pelvis: Multiple enlarged mesenteric lymphadenopathy. Additional enlarged retroperitoneal lymph nodes.  [facility]  -obtain pathology report from [facility] - follow up     # Latent TB   - on isoniazid    #DM II  - A1C 5.6  - outpatient follow up     #HTN   - continue nifedipine XL 60mg daily      Diet: dash/halal  DVT prophylaxis - SCD   code : full    Pending:  obtain records from pt's oncologist, diuresis, creatinine

## 2023-06-02 NOTE — PROGRESS NOTE ADULT - ASSESSMENT
72-year-old male w/ hx of lymphoma, necrotic mesenteric lymphadenitis, HLD, DMA, and SHAI, known to Dr. Iglesias (pcp), presents for Upper and lower b/l extremity swelling  Cr ~ 2 was 0.7 July 2022  cr stable past days  UA noted / urine pr/cr ratio 0.5g/g  albumin 2.5 due to ?  continue lasix   non oliguric   BP well controlled   k/l ok, f/u SIFE  ph at goal   will follow

## 2023-06-02 NOTE — PROGRESS NOTE ADULT - ASSESSMENT
70 y/o man with PMH of low grade lymphoma, DM type 2, mesenteric lymphangitis dx in 2022 (related to lymphoma and results known to his oncologist), latent TB and on INH, iron deficiency anemia on oral iron and HTN now presents with UE and LE edema and found to have microcytic anemia and MAXX.   previous notes reviewed by me  PMD: Kelsie Cooper...     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  - pt's wife states that he had EGD/colonoscopy 1 year ago here but I do not see any report in EMR (pt and wife are not good historians - denied h/o lymphoma initially)  - rule out blood loss, anemia due to BM suppression, worsening of lymphoma  - GI consult appreciated - recommend EGD/colonoscopy as outpt - continue to trend H/H - if dropping, then reconsider as inpt  - CT scan of abd/pelvis -noncontrast ----- no hemorrhage   - unable to do CTA due to MAXX  - HemOnc consulted - case discussed with the fellow - f/u recommendations - IV iron x 5 days ordered  - obtain pathology report from [facility] - biopsy by Dr. Albaro Ramos which was remarkable for low grade lymphoma.. Called the office 05/30. will send me the records via fax. Also called PCP. As per PCP he does not have any new record regarding his lymphoma nad anemia. Only recommended to stop Isoniazid as he was taking it for a long time. Recalled patient oncologist office [phone] , will send the record today 06/01/23  - check peripheral smear, SPEP, UPEP, f/u serum and urine immunofixation, TLS labs (uric acid, CMP, phos, LDH)  - CBC in AM  - keep active T&S  - transfuse for Hgb <7    # MAXX - Cr now around 1.7  - Nephrology consulted : cr stable, UA noted / please check pr/ cr ratio / albumin 2.3 due to ? continue lasix . non oliguric ,bp controlled .please check k/l , IF .ph at goal .sat elevated 33 ? need for venofer   - will follow     # UE and LE edema - previous notes reviewed and pt without edema per last admission  -on furosemide 20mg daily at home. started on 60mg IV Lasix here. Called pt pcp dr cooper. As per pcp, pt is taking lasix as it was prescribed by his cardio dr. Patient does not know that he has a cardio dr. PCP is unaware if pt have any hx of heart failure.   -f/u venous duplex LE-- negative   - ddimer 235  -   - Albumin 2.4 (stable from last year)  - Echo EF: 59%. No HF     # h/o low grade lymphoma  -CT scan chest: Multiple prominent subcentimeter lymph nodes within the prevascular, paraaortic and lower paratracheal regions. Additional prominent lymph nodes within the bilateral axilla.  -CT scan abd/pelvis: Multiple enlarged mesenteric lymphadenopathy. Additional enlarged retroperitoneal lymph nodes.  - seen by [facility]  - obtain pathology report from [facility]---  Called the office 05/30. will send me the records via fax  - on 06/02, I called Dr. Albaro Ramos oncologist office again today >> their office was closed today and   recorded my message for on call doctor, I gave them them unit fax number as well [phone], and they said on call doctor will contact and send pathology report    #Latent TB   - on isoniazid - clarify start date of therapy  - Called PCP. as per PCP can stop now. Stopped 05/30    #DM type 2   - monitor FS - A1C 5.6    #HTN   - on nifedipine XL 30mg daily - would not increase dose since this can worsen peripheral edema (not a new med per chart review)      Diet: dash/halal  DVT prophylaxis - SCD   code : full    Pending:  obtain records from pt's oncologist, fu SPEP/UPEP 72 y/o man with PMH of low grade lymphoma, DM type 2, mesenteric lymphangitis dx in 2022 (related to lymphoma and results known to his oncologist), latent TB and on INH, iron deficiency anemia on oral iron and HTN now presents with UE and LE edema and found to have microcytic anemia and MAXX.   previous notes reviewed by me  PMD: Kelsie Cooper...     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  - pt's wife states that he had EGD/colonoscopy 1 year ago here but I do not see any report in EMR (pt and wife are not good historians - denied h/o lymphoma initially)  - rule out blood loss, anemia due to BM suppression, worsening of lymphoma  - GI consult appreciated - recommend EGD/colonoscopy as outpt - continue to trend H/H - if dropping, then reconsider as inpt  - CT scan of abd/pelvis -noncontrast ----- no hemorrhage   - unable to do CTA due to MAXX  - HemOnc consulted - case discussed with the fellow - f/u recommendations - IV iron x 5 days ordered  - obtain pathology report from [facility] - biopsy by Dr. Albaro Ramos which was remarkable for low grade lymphoma.. Called the office 05/30. will send me the records via fax. Also called PCP. As per PCP he does not have any new record regarding his lymphoma nad anemia. Only recommended to stop Isoniazid as he was taking it for a long time. Recalled patient oncologist office [phone] , will send the record today 06/01/23  - check peripheral smear, SPEP, UPEP, f/u serum and urine immunofixation, TLS labs (uric acid, CMP, phos, LDH)  - keep active T&S  - transfuse for Hgb <7    # MAXX - Cr now around 1.7  - Nephrology consulted : cr stable, UA noted / please check pr/ cr ratio / albumin 2.3 due to ? continue lasix . non oliguric ,bp controlled .please check k/l , IF .ph at goal .sat elevated 33 ? need for venofer   - will follow     # UE and LE edema - previous notes reviewed and pt without edema per last admission  -on furosemide 20mg daily at home. started on 60mg IV Lasix here. Called pt pcp dr cooper. As per pcp, pt is taking lasix as it was prescribed by his cardio dr. Patient does not know that he has a cardio dr. PCP is unaware if pt have any hx of heart failure.   -venous duplex LE-- negative   - ddimer 235  -   - Albumin 2.4 (stable from last year)  - Echo EF: 59%. No HF   - Lasix 40mg IV daily  for now    # h/o low grade lymphoma  -CT scan chest: Multiple prominent subcentimeter lymph nodes within the prevascular, paraaortic and lower paratracheal regions. Additional prominent lymph nodes within the bilateral axilla.  -CT scan abd/pelvis: Multiple enlarged mesenteric lymphadenopathy. Additional enlarged retroperitoneal lymph nodes.  - seen by [facility]  - obtain pathology report from [facility]---  Called the office 05/30. will send me the records via fax  - on 06/02, I called Dr. Albaro Ramos oncologist office again today >> their office was closed today and   recorded my message for on call doctor, I gave them them unit fax number as well [phone], and they said on call doctor will contact and send pathology report    #Latent TB   - on isoniazid - clarify start date of therapy  - Called PCP. as per PCP can stop now. Stopped 05/30    #DM type 2   - monitor FS - A1C 5.6    #HTN   - on nifedipine XL 30mg daily - would not increase dose since this can worsen peripheral edema (not a new med per chart review)      Diet: dash/halal  DVT prophylaxis - SCD   code : full    Pending:  obtain records from pt's oncologist, fu SPEP/UPEP

## 2023-06-03 LAB
ALBUMIN SERPL ELPH-MCNC: 2.5 G/DL — LOW (ref 3.5–5.2)
ALP SERPL-CCNC: 112 U/L — SIGNIFICANT CHANGE UP (ref 30–115)
ALT FLD-CCNC: 18 U/L — SIGNIFICANT CHANGE UP (ref 0–41)
ANION GAP SERPL CALC-SCNC: 9 MMOL/L — SIGNIFICANT CHANGE UP (ref 7–14)
AST SERPL-CCNC: 52 U/L — HIGH (ref 0–41)
BASOPHILS # BLD AUTO: 0.05 K/UL — SIGNIFICANT CHANGE UP (ref 0–0.2)
BASOPHILS NFR BLD AUTO: 0.7 % — SIGNIFICANT CHANGE UP (ref 0–1)
BILIRUB SERPL-MCNC: 0.3 MG/DL — SIGNIFICANT CHANGE UP (ref 0.2–1.2)
BUN SERPL-MCNC: 40 MG/DL — HIGH (ref 10–20)
CALCIUM SERPL-MCNC: 8.1 MG/DL — LOW (ref 8.4–10.5)
CHLORIDE SERPL-SCNC: 103 MMOL/L — SIGNIFICANT CHANGE UP (ref 98–110)
CO2 SERPL-SCNC: 27 MMOL/L — SIGNIFICANT CHANGE UP (ref 17–32)
CREAT SERPL-MCNC: 2.2 MG/DL — HIGH (ref 0.7–1.5)
EGFR: 31 ML/MIN/1.73M2 — LOW
EOSINOPHIL # BLD AUTO: 0.21 K/UL — SIGNIFICANT CHANGE UP (ref 0–0.7)
EOSINOPHIL NFR BLD AUTO: 2.9 % — SIGNIFICANT CHANGE UP (ref 0–8)
GLUCOSE SERPL-MCNC: 168 MG/DL — HIGH (ref 70–99)
HCT VFR BLD CALC: 25.7 % — LOW (ref 42–52)
HGB BLD-MCNC: 8 G/DL — LOW (ref 14–18)
IMM GRANULOCYTES NFR BLD AUTO: 0.4 % — HIGH (ref 0.1–0.3)
LYMPHOCYTES # BLD AUTO: 1.99 K/UL — SIGNIFICANT CHANGE UP (ref 1.2–3.4)
LYMPHOCYTES # BLD AUTO: 27.1 % — SIGNIFICANT CHANGE UP (ref 20.5–51.1)
MAGNESIUM SERPL-MCNC: 1.6 MG/DL — LOW (ref 1.8–2.4)
MCHC RBC-ENTMCNC: 25.9 PG — LOW (ref 27–31)
MCHC RBC-ENTMCNC: 31.1 G/DL — LOW (ref 32–37)
MCV RBC AUTO: 83.2 FL — SIGNIFICANT CHANGE UP (ref 80–94)
MONOCYTES # BLD AUTO: 0.59 K/UL — SIGNIFICANT CHANGE UP (ref 0.1–0.6)
MONOCYTES NFR BLD AUTO: 8 % — SIGNIFICANT CHANGE UP (ref 1.7–9.3)
NEUTROPHILS # BLD AUTO: 4.48 K/UL — SIGNIFICANT CHANGE UP (ref 1.4–6.5)
NEUTROPHILS NFR BLD AUTO: 60.9 % — SIGNIFICANT CHANGE UP (ref 42.2–75.2)
NRBC # BLD: 0 /100 WBCS — SIGNIFICANT CHANGE UP (ref 0–0)
PLATELET # BLD AUTO: 291 K/UL — SIGNIFICANT CHANGE UP (ref 130–400)
PMV BLD: 9.3 FL — SIGNIFICANT CHANGE UP (ref 7.4–10.4)
POTASSIUM SERPL-MCNC: 3.8 MMOL/L — SIGNIFICANT CHANGE UP (ref 3.5–5)
POTASSIUM SERPL-SCNC: 3.8 MMOL/L — SIGNIFICANT CHANGE UP (ref 3.5–5)
PROT SERPL-MCNC: 4.7 G/DL — LOW (ref 6–8)
RBC # BLD: 3.09 M/UL — LOW (ref 4.7–6.1)
RBC # FLD: 20 % — HIGH (ref 11.5–14.5)
SODIUM SERPL-SCNC: 139 MMOL/L — SIGNIFICANT CHANGE UP (ref 135–146)
WBC # BLD: 7.35 K/UL — SIGNIFICANT CHANGE UP (ref 4.8–10.8)
WBC # FLD AUTO: 7.35 K/UL — SIGNIFICANT CHANGE UP (ref 4.8–10.8)

## 2023-06-03 PROCEDURE — 71045 X-RAY EXAM CHEST 1 VIEW: CPT | Mod: 26

## 2023-06-03 PROCEDURE — 99232 SBSQ HOSP IP/OBS MODERATE 35: CPT

## 2023-06-03 RX ADMIN — Medication 40 MILLIGRAM(S): at 06:24

## 2023-06-03 RX ADMIN — SIMVASTATIN 40 MILLIGRAM(S): 20 TABLET, FILM COATED ORAL at 22:49

## 2023-06-03 RX ADMIN — Medication 60 MILLIGRAM(S): at 06:24

## 2023-06-03 RX ADMIN — PANTOPRAZOLE SODIUM 40 MILLIGRAM(S): 20 TABLET, DELAYED RELEASE ORAL at 06:25

## 2023-06-03 RX ADMIN — Medication 1 MILLIGRAM(S): at 16:18

## 2023-06-03 NOTE — PROGRESS NOTE ADULT - ASSESSMENT
72-year-old male w/ hx of lymphoma, necrotic mesenteric lymphadenitis, HLD, DMA, and SHAI, known to Dr. Iglesias (pcp), presents for Upper and lower b/l extremity swelling  Cr ~ 2 was 0.7 July 2022  cr stable   UA noted / urine pr/cr ratio 0.5g/g  albumin 2.5 due to ?  continue lasix   non oliguric   BP controlled   k/l ok  ph at goal   will follow

## 2023-06-03 NOTE — PROGRESS NOTE ADULT - ASSESSMENT
70 y/o man with PMH of low grade lymphoma, DM type 2, mesenteric lymphangitis dx in 2022 (related to lymphoma and results known to his oncologist), latent TB and on INH, iron deficiency anemia on oral iron and HTN now presents with UE and LE edema and found to have microcytic anemia and MAXX.   previous notes reviewed by me  PMD: Kelsie Lopez    # Microcytic anemia - iron stores are adequate  - s/p 2 units PRBC and Hgb is improved and stable - pt and wife deny BRBPR or melena  - GI consult appreciated - recommend EGD/colonoscopy as outpt   - continue to trend H/H daily   - CT scan of abd/pelvis and chest  -noncontrast - significant lymphadenopathy   - Hem/Onc consullt appreciated - [facility]; 6/3 spoke to gillian Gandhi [phone] and she said will try to fax pathology report   - keep active T&S    #MAXX   - baseline cr = 1-->2.1  - holding nephrotoxics- Cr now around 2.1  - renal/bladder US: no hydronephrosis  - no hydronephrosis on CT scan  - check FeUrea  - daily BMP  - monitor I's & O's  - nephrology following    # UE and LE edema - improving   - venous duplex LE negative  - Albumin 2.4 (stable from last year)  - ECHO = grade 1 diastolic dysfunction  - continue IV lasix 40mg    # h/o low grade lymphoma  -CT scan chest: Multiple prominent subcentimeter lymph nodes within the prevascular, paraaortic and lower paratracheal regions. Additional prominent lymph   nodes within the bilateral axilla.  -CT scan abd/pelvis: Multiple enlarged mesenteric lymphadenopathy. Additional enlarged retroperitoneal lymph nodes.  seen by [facility]  -obtain pathology report from [facility] - follow up     # Latent TB   - on isoniazid    #DM II  - A1C 5.6  - outpatient follow up     #HTN   - continue nifedipine XL 60mg daily      Diet: dash/halal  DVT prophylaxis - SCD   code : full    Pending:  obtain records from pt's oncologist, diuresis, creatinine

## 2023-06-04 PROCEDURE — 99232 SBSQ HOSP IP/OBS MODERATE 35: CPT

## 2023-06-04 RX ORDER — MAGNESIUM SULFATE 500 MG/ML
2 VIAL (ML) INJECTION ONCE
Refills: 0 | Status: COMPLETED | OUTPATIENT
Start: 2023-06-04 | End: 2023-06-04

## 2023-06-04 RX ADMIN — Medication 25 GRAM(S): at 21:16

## 2023-06-04 RX ADMIN — Medication 60 MILLIGRAM(S): at 06:17

## 2023-06-04 RX ADMIN — Medication 40 MILLIGRAM(S): at 06:18

## 2023-06-04 RX ADMIN — Medication 1 MILLIGRAM(S): at 12:00

## 2023-06-04 RX ADMIN — SIMVASTATIN 40 MILLIGRAM(S): 20 TABLET, FILM COATED ORAL at 21:15

## 2023-06-04 RX ADMIN — Medication 650 MILLIGRAM(S): at 18:13

## 2023-06-04 RX ADMIN — PANTOPRAZOLE SODIUM 40 MILLIGRAM(S): 20 TABLET, DELAYED RELEASE ORAL at 06:18

## 2023-06-04 NOTE — PROGRESS NOTE ADULT - ASSESSMENT
Pt with lymphoma with necrotic mesenteric lymphadenitis , fluid overload edema , continue Lasix and monitor , hemoglobin 8 iron sat 33 , may require transfusion per hemonc , creatinine 2.2 , MAXX on CKD

## 2023-06-04 NOTE — PROGRESS NOTE ADULT - ASSESSMENT
72 y/o man with PMH of low grade lymphoma, DM type 2, mesenteric lymphangitis dx in 2022 (related to lymphoma and results known to his oncologist), latent TB and on INH, iron deficiency anemia on oral iron and HTN now presents with UE and LE edema and found to have microcytic anemia and MAXX.   previous notes reviewed by me  PMD: Kelsie Julio Cedwardo    # Microcytic anemia - iron stores are adequate  - s/p 2 units PRBC and Hgb is improved and stable - pt and wife deny BRBPR or melena  - GI consult appreciated - recommend EGD/colonoscopy as outpt   - continue to trend H/H daily   - CT scan of abd/pelvis and chest  -noncontrast - significant lymphadenopathy   - Hem/Onc consullt appreciated [facility]; 6/3 spoke to gillian Gandhi [phone] and she said will try to fax pathology report   - keep active T&S    #MAXX   - baseline cr = 1-->2.1-->2.2   - holding nephrotoxics- Cr now around 2.1  - renal/bladder US: no hydronephrosis  - no hydronephrosis on CT scan  - check FeUrea  - daily BMP  - monitor I's & O's  - nephrology following    # UE and LE edema - improving   - venous duplex LE negative  - Albumin 2.4 (stable from last year)  - ECHO = grade 1 diastolic dysfunction  - continue IV lasix 40mg    # h/o low grade lymphoma  -CT scan chest: Multiple prominent subcentimeter lymph nodes within the prevascular, paraaortic and lower paratracheal regions. Additional prominent lymph   nodes within the bilateral axilla.  -CT scan abd/pelvis: Multiple enlarged mesenteric lymphadenopathy. Additional enlarged retroperitoneal lymph nodes.  seen by [facility]  -obtain pathology report from [facility] - follow up     # Latent TB   - on isoniazid    #DM II  - A1C 5.6  - outpatient follow up     #HTN   - continue nifedipine XL 60mg daily      Diet: dash/halal  DVT prophylaxis - SCD   code : full    Pending:  obtain records from pt's oncologist, diuresis, creatinine

## 2023-06-05 LAB
ALBUMIN SERPL ELPH-MCNC: 2.3 G/DL — LOW (ref 3.5–5.2)
ALP SERPL-CCNC: 182 U/L — HIGH (ref 30–115)
ALT FLD-CCNC: 25 U/L — SIGNIFICANT CHANGE UP (ref 0–41)
ANION GAP SERPL CALC-SCNC: 8 MMOL/L — SIGNIFICANT CHANGE UP (ref 7–14)
AST SERPL-CCNC: 72 U/L — HIGH (ref 0–41)
BASOPHILS # BLD AUTO: 0.04 K/UL — SIGNIFICANT CHANGE UP (ref 0–0.2)
BASOPHILS NFR BLD AUTO: 0.6 % — SIGNIFICANT CHANGE UP (ref 0–1)
BILIRUB SERPL-MCNC: 0.2 MG/DL — SIGNIFICANT CHANGE UP (ref 0.2–1.2)
BLD GP AB SCN SERPL QL: SIGNIFICANT CHANGE UP
BUN SERPL-MCNC: 39 MG/DL — HIGH (ref 10–20)
CALCIUM SERPL-MCNC: 8.1 MG/DL — LOW (ref 8.4–10.5)
CHLORIDE SERPL-SCNC: 107 MMOL/L — SIGNIFICANT CHANGE UP (ref 98–110)
CO2 SERPL-SCNC: 28 MMOL/L — SIGNIFICANT CHANGE UP (ref 17–32)
CREAT SERPL-MCNC: 2.3 MG/DL — HIGH (ref 0.7–1.5)
EGFR: 29 ML/MIN/1.73M2 — LOW
EOSINOPHIL # BLD AUTO: 0.18 K/UL — SIGNIFICANT CHANGE UP (ref 0–0.7)
EOSINOPHIL NFR BLD AUTO: 2.9 % — SIGNIFICANT CHANGE UP (ref 0–8)
GLUCOSE SERPL-MCNC: 114 MG/DL — HIGH (ref 70–99)
HCT VFR BLD CALC: 23.9 % — LOW (ref 42–52)
HGB BLD-MCNC: 7.6 G/DL — LOW (ref 14–18)
IMM GRANULOCYTES NFR BLD AUTO: 0.3 % — SIGNIFICANT CHANGE UP (ref 0.1–0.3)
LYMPHOCYTES # BLD AUTO: 1.98 K/UL — SIGNIFICANT CHANGE UP (ref 1.2–3.4)
LYMPHOCYTES # BLD AUTO: 31.6 % — SIGNIFICANT CHANGE UP (ref 20.5–51.1)
MAGNESIUM SERPL-MCNC: 1.8 MG/DL — SIGNIFICANT CHANGE UP (ref 1.8–2.4)
MCHC RBC-ENTMCNC: 26.6 PG — LOW (ref 27–31)
MCHC RBC-ENTMCNC: 31.8 G/DL — LOW (ref 32–37)
MCV RBC AUTO: 83.6 FL — SIGNIFICANT CHANGE UP (ref 80–94)
MONOCYTES # BLD AUTO: 0.58 K/UL — SIGNIFICANT CHANGE UP (ref 0.1–0.6)
MONOCYTES NFR BLD AUTO: 9.3 % — SIGNIFICANT CHANGE UP (ref 1.7–9.3)
NEUTROPHILS # BLD AUTO: 3.46 K/UL — SIGNIFICANT CHANGE UP (ref 1.4–6.5)
NEUTROPHILS NFR BLD AUTO: 55.3 % — SIGNIFICANT CHANGE UP (ref 42.2–75.2)
NRBC # BLD: 0 /100 WBCS — SIGNIFICANT CHANGE UP (ref 0–0)
PLATELET # BLD AUTO: 315 K/UL — SIGNIFICANT CHANGE UP (ref 130–400)
PMV BLD: 9 FL — SIGNIFICANT CHANGE UP (ref 7.4–10.4)
POTASSIUM SERPL-MCNC: 3.9 MMOL/L — SIGNIFICANT CHANGE UP (ref 3.5–5)
POTASSIUM SERPL-SCNC: 3.9 MMOL/L — SIGNIFICANT CHANGE UP (ref 3.5–5)
PROT SERPL-MCNC: 4.4 G/DL — LOW (ref 6–8)
RBC # BLD: 2.86 M/UL — LOW (ref 4.7–6.1)
RBC # FLD: 19.3 % — HIGH (ref 11.5–14.5)
SODIUM SERPL-SCNC: 143 MMOL/L — SIGNIFICANT CHANGE UP (ref 135–146)
WBC # BLD: 6.26 K/UL — SIGNIFICANT CHANGE UP (ref 4.8–10.8)
WBC # FLD AUTO: 6.26 K/UL — SIGNIFICANT CHANGE UP (ref 4.8–10.8)

## 2023-06-05 PROCEDURE — 99232 SBSQ HOSP IP/OBS MODERATE 35: CPT

## 2023-06-05 RX ORDER — FUROSEMIDE 40 MG
40 TABLET ORAL DAILY
Refills: 0 | Status: DISCONTINUED | OUTPATIENT
Start: 2023-06-06 | End: 2023-06-07

## 2023-06-05 RX ORDER — FUROSEMIDE 40 MG
40 TABLET ORAL DAILY
Refills: 0 | Status: DISCONTINUED | OUTPATIENT
Start: 2023-06-05 | End: 2023-06-05

## 2023-06-05 RX ADMIN — SIMVASTATIN 40 MILLIGRAM(S): 20 TABLET, FILM COATED ORAL at 21:25

## 2023-06-05 RX ADMIN — PANTOPRAZOLE SODIUM 40 MILLIGRAM(S): 20 TABLET, DELAYED RELEASE ORAL at 05:27

## 2023-06-05 RX ADMIN — Medication 1 MILLIGRAM(S): at 12:24

## 2023-06-05 RX ADMIN — Medication 60 MILLIGRAM(S): at 05:27

## 2023-06-05 RX ADMIN — Medication 40 MILLIGRAM(S): at 05:27

## 2023-06-05 NOTE — PROGRESS NOTE ADULT - ASSESSMENT
72 y/o man with PMH of low grade lymphoma, DM type 2, mesenteric lymphangitis dx in 2022 (related to lymphoma and results known to his oncologist), latent TB and on INH, iron deficiency anemia on oral iron and HTN now presents with UE and LE edema and found to have microcytic anemia and MAXX.   previous notes reviewed by me  PMD: Kelsie Lopez    # Microcytic anemia - iron stores are adequate  - s/p 2 units PRBC and Hgb is improved and stable - pt and wife deny BRBPR or melena  - GI consult appreciated - recommend EGD/colonoscopy as outpt   - continue to trend H/H daily   - CT scan of abd/pelvis and chest  -noncontrast - significant lymphadenopathy   - Hem/Onc consullt appreciated - [facility]; 6/3 spoke to gillian Gandhi [phone] and she said will try to fax pathology report   - keep active T&S    #MAXX   - baseline cr = 1-->2.1-->2.2 -->2.3  - holding nephrotoxics- Cr now around 2.1  - renal/bladder US: no hydronephrosis  - no hydronephrosis on CT scan  - check FeUrea  - daily BMP  - monitor I's & O's  - nephrology following    # UE and LE edema - improving   - venous duplex LE negative  - Albumin 2.4 (stable from last year)  - ECHO = grade 1 diastolic dysfunction  - reduce to PO lasix 40mg    # h/o low grade lymphoma  -CT scan chest: Multiple prominent subcentimeter lymph nodes within the prevascular, paraaortic and lower paratracheal regions. Additional prominent lymph   nodes within the bilateral axilla.  -CT scan abd/pelvis: Multiple enlarged mesenteric lymphadenopathy. Additional enlarged retroperitoneal lymph nodes.  seen by [facility]  -obtain pathology report from [facility] - follow up     # Latent TB   - on isoniazid    #DM II  - A1C 5.6  - outpatient follow up     #HTN   - continue nifedipine XL 60mg daily      Diet: dash/halal  DVT prophylaxis - SCD   code : full    Pending:  obtain records from pt's oncologist, diuresis, creatinine, h/h

## 2023-06-05 NOTE — PROGRESS NOTE ADULT - ASSESSMENT
72-year-old male w/ hx of lymphoma, necrotic mesenteric lymphadenitis, HLD, DMA, and SHAI, known to Dr. Iglesias (pcp), presents for Upper and lower b/l extremity swelling    non oliguric  creat stable ~ 2 (0.7 July 2022)  UA noted / urine pr/cr ratio 0.5g/g  continue lasix / change to po  BP controlled   k/l ok  ph at goal   needs outpatient nephrology f/u for further w/u and monitoring.    Nephrology signing off.  Recall as needed.     72-year-old male w/ hx of lymphoma, necrotic mesenteric lymphadenitis, HLD, DMA, and SHAI, known to Dr. Iglesias (pcp), presents for Upper and lower b/l extremity swelling, with hypoalbuminemia    non oliguric  creat stable ~ 2 (0.7 July 2022)  UA noted / urine pr/cr ratio 0.5g/g  continue lasix / change to po  BP controlled   k/l ok  ph at goal   needs outpatient nephrology f/u for further w/u and monitoring.  Gave pt information to schedule f/u appt at [phone]    Nephrology signing off.  Recall as needed.

## 2023-06-05 NOTE — PROGRESS NOTE ADULT - ASSESSMENT
70 y/o man with PMH of low grade lymphoma, DM type 2, mesenteric lymphangitis dx in 2022 (related to lymphoma and results known to his oncologist), latent TB and on INH, iron deficiency anemia on oral iron and HTN now presents with UE and LE edema and found to have microcytic anemia and MAXX.   previous notes reviewed by me  PMD: Kelsie Cooper...     #Microcytic anemia   - Now s/p 2 units PRBC and Hgb is improved and stable - pt and wife deny BRBPR or melena  - retic count 2, , haptoglobin WNL  - iron studies WNL but done after blood transfusion  - ferritin 19  - pt's wife states that he had EGD/colonoscopy 1 year ago here but I do not see any report in EMR (pt and wife are not good historians - denied h/o lymphoma initially)  - rule out blood loss, anemia due to BM suppression, worsening of lymphoma  - GI consult appreciated - recommend EGD/colonoscopy as outpt - continue to trend H/H - if dropping, then reconsider as inpt  - CT scan of abd/pelvis -noncontrast ----- no hemorrhage   - unable to do CTA due to MAXX  - HemOnc consulted - case discussed with the fellow - f/u recommendations - IV iron x 5 days ordered  - obtain pathology report from [facility] - biopsy by Dr. Albaro Ramos which was remarkable for low grade lymphoma.. medicine team Called the office 05/30. will send the records via fax. Team Also called PCP. As per PCP he does not have any new record regarding his lymphoma nad anemia. Only recommended to stop Isoniazid as he was taking it for a long time. Team Recalled patient oncologist office [phone] , to send record 06/01/23  - keep active T&S  - transfuse for Hgb <7  - I tried calling gillian Gandhi [phone] to fax pathology report, however she did not answer phone. will call again       # MAXX   - renal/bladder US: no hydronephrosis  - no hydronephrosis on CT scan  - check FeUrea  - daily BMP  - monitor I's & O's    # UE and LE edema   - pt without edema per last admission  -on furosemide 20mg daily at home. started on 60mg IV Lasix here. Called pt pcp dr cooper. As per pcp, pt is taking lasix as it was prescribed by his cardio dr. Patient does not know that he has a cardio dr. PCP is unaware if pt have any hx of heart failure.   -venous duplex LE-- negative   - ddimer 235  -   - Albumin 2.4 (stable from last year)  - Echo EF: 59%. No HF   - changed lasix from IV to PO 40 mg qd    # h/o low grade lymphoma  -CT scan chest: Multiple prominent subcentimeter lymph nodes within the prevascular, paraaortic and lower paratracheal regions. Additional prominent lymph nodes within the bilateral axilla.  -CT scan abd/pelvis: Multiple enlarged mesenteric lymphadenopathy. Additional enlarged retroperitoneal lymph nodes.  - seen by [facility]  - obtain pathology report from [facility]--- team Called the office 05/30. will send the records via fax  - on 06/02, team called Dr. Albaro Ramos oncologist office again today >> their office was closed today and   recorded my message for on call doctor, team gave them them unit fax number as well [phone], and they said on call doctor will contact and send pathology report  -- I tried calling gillian Gandhi [phone] to fax pathology report, however she did not answer phone. will call again    #Latent TB   - on isoniazid - clarify start date of therapy  - Called PCP. as per PCP can stop now. Stopped 05/30    #DM type 2   - monitor FS - A1C 5.6    #HTN   - on nifedipine XL60 mg qd      Diet: dash/halal  DVT prophylaxis - SCD   code : full 72 y/o man with PMH of low grade lymphoma, DM type 2, mesenteric lymphangitis dx in 2022 (related to lymphoma and results known to his oncologist), latent TB and on INH, iron deficiency anemia on oral iron and HTN now presents with UE and LE edema and found to have microcytic anemia and MAXX.   previous notes reviewed by me  PMD: Kelsie Cooper...     #Microcytic anemia   - Now s/p 2 units PRBC and Hgb is improved and stable - pt and wife deny BRBPR or melena  - retic count 2, , haptoglobin WNL  - iron studies WNL but done after blood transfusion  - ferritin 19  - pt's wife states that he had EGD/colonoscopy 1 year ago here but I do not see any report in EMR (pt and wife are not good historians - denied h/o lymphoma initially)  - rule out blood loss, anemia due to BM suppression, worsening of lymphoma  - GI consult appreciated - recommend EGD/colonoscopy as outpt - continue to trend H/H - if dropping, then reconsider as inpt  - CT scan of abd/pelvis -noncontrast ----- no hemorrhage   - unable to do CTA due to MAXX  - HemOnc consulted - case discussed with the fellow - f/u recommendations - IV iron x 5 days ordered  - obtain pathology report from [facility] - biopsy by Dr. Albaro Ramos which was remarkable for low grade lymphoma.. medicine team Called the office 05/30. will send the records via fax. Team Also called PCP. As per PCP he does not have any new record regarding his lymphoma nad anemia. Only recommended to stop Isoniazid as he was taking it for a long time. Team Recalled patient oncologist office [phone] , to send record 06/01/23  - keep active T&S  - transfuse for Hgb <7  - I tried calling gillian Gandhi [phone] to fax pathology report, however she did not answer phone. will call again       # MAXX   - renal/bladder US: no hydronephrosis  - no hydronephrosis on CT scan  - check FeUrea  - daily BMP  - monitor I's & O's    #transaminitis  -monitor     # UE and LE edema   - pt without edema per last admission  -on furosemide 20mg daily at home. started on 60mg IV Lasix here. Called pt pcp dr cooper. As per pcp, pt is taking lasix as it was prescribed by his cardio dr. Patient does not know that he has a cardio dr. PCP is unaware if pt have any hx of heart failure.   -venous duplex LE-- negative   - ddimer 235  -   - Albumin 2.4 (stable from last year)  - Echo EF: 59%. No HF   - changed lasix from IV to PO 40 mg qd    # h/o low grade lymphoma  -CT scan chest: Multiple prominent subcentimeter lymph nodes within the prevascular, paraaortic and lower paratracheal regions. Additional prominent lymph nodes within the bilateral axilla.  -CT scan abd/pelvis: Multiple enlarged mesenteric lymphadenopathy. Additional enlarged retroperitoneal lymph nodes.  - seen by [facility]  - obtain pathology report from [facility]--- team Called the office 05/30. will send the records via fax  - on 06/02, team called Dr. Albaro Ramos oncologist office again today >> their office was closed today and   recorded my message for on call doctor, team gave them them unit fax number as well [phone], and they said on call doctor will contact and send pathology report  -- I tried calling gillian Gandhi [phone] to fax pathology report, however she did not answer phone. will call again    #Latent TB   - on isoniazid - clarify start date of therapy  - Called PCP. as per PCP can stop now. Stopped 05/30    #DM type 2   - monitor FS - A1C 5.6    #HTN   - on nifedipine XL60 mg qd      Diet: dash/halal  DVT prophylaxis - SCD   code : full

## 2023-06-06 LAB
ALBUMIN SERPL ELPH-MCNC: 2.3 G/DL — LOW (ref 3.5–5.2)
ALP SERPL-CCNC: 161 U/L — HIGH (ref 30–115)
ALT FLD-CCNC: 24 U/L — SIGNIFICANT CHANGE UP (ref 0–41)
ANION GAP SERPL CALC-SCNC: 10 MMOL/L — SIGNIFICANT CHANGE UP (ref 7–14)
AST SERPL-CCNC: 56 U/L — HIGH (ref 0–41)
BASOPHILS # BLD AUTO: 0.05 K/UL — SIGNIFICANT CHANGE UP (ref 0–0.2)
BASOPHILS NFR BLD AUTO: 0.8 % — SIGNIFICANT CHANGE UP (ref 0–1)
BILIRUB SERPL-MCNC: <0.2 MG/DL — SIGNIFICANT CHANGE UP (ref 0.2–1.2)
BUN SERPL-MCNC: 34 MG/DL — HIGH (ref 10–20)
CALCIUM SERPL-MCNC: 8.2 MG/DL — LOW (ref 8.4–10.5)
CHLORIDE SERPL-SCNC: 104 MMOL/L — SIGNIFICANT CHANGE UP (ref 98–110)
CO2 SERPL-SCNC: 28 MMOL/L — SIGNIFICANT CHANGE UP (ref 17–32)
CREAT SERPL-MCNC: 1.8 MG/DL — HIGH (ref 0.7–1.5)
EGFR: 39 ML/MIN/1.73M2 — LOW
EOSINOPHIL # BLD AUTO: 0.2 K/UL — SIGNIFICANT CHANGE UP (ref 0–0.7)
EOSINOPHIL NFR BLD AUTO: 3.3 % — SIGNIFICANT CHANGE UP (ref 0–8)
GLUCOSE SERPL-MCNC: 102 MG/DL — HIGH (ref 70–99)
HCT VFR BLD CALC: 23.2 % — LOW (ref 42–52)
HGB BLD-MCNC: 7.2 G/DL — LOW (ref 14–18)
IMM GRANULOCYTES NFR BLD AUTO: 0.3 % — SIGNIFICANT CHANGE UP (ref 0.1–0.3)
INTERPRETATION 24H UR IFE-IMP: SIGNIFICANT CHANGE UP
INTERPRETATION 24H UR IFE-IMP: SIGNIFICANT CHANGE UP
LYMPHOCYTES # BLD AUTO: 1.86 K/UL — SIGNIFICANT CHANGE UP (ref 1.2–3.4)
LYMPHOCYTES # BLD AUTO: 30.5 % — SIGNIFICANT CHANGE UP (ref 20.5–51.1)
MAGNESIUM SERPL-MCNC: 1.6 MG/DL — LOW (ref 1.8–2.4)
MCHC RBC-ENTMCNC: 26 PG — LOW (ref 27–31)
MCHC RBC-ENTMCNC: 31 G/DL — LOW (ref 32–37)
MCV RBC AUTO: 83.8 FL — SIGNIFICANT CHANGE UP (ref 80–94)
MONOCYTES # BLD AUTO: 0.5 K/UL — SIGNIFICANT CHANGE UP (ref 0.1–0.6)
MONOCYTES NFR BLD AUTO: 8.2 % — SIGNIFICANT CHANGE UP (ref 1.7–9.3)
NEUTROPHILS # BLD AUTO: 3.47 K/UL — SIGNIFICANT CHANGE UP (ref 1.4–6.5)
NEUTROPHILS NFR BLD AUTO: 56.9 % — SIGNIFICANT CHANGE UP (ref 42.2–75.2)
NRBC # BLD: 0 /100 WBCS — SIGNIFICANT CHANGE UP (ref 0–0)
PLATELET # BLD AUTO: 320 K/UL — SIGNIFICANT CHANGE UP (ref 130–400)
PMV BLD: 9 FL — SIGNIFICANT CHANGE UP (ref 7.4–10.4)
POTASSIUM SERPL-MCNC: 3.6 MMOL/L — SIGNIFICANT CHANGE UP (ref 3.5–5)
POTASSIUM SERPL-SCNC: 3.6 MMOL/L — SIGNIFICANT CHANGE UP (ref 3.5–5)
PROT SERPL-MCNC: 4.4 G/DL — LOW (ref 6–8)
RBC # BLD: 2.77 M/UL — LOW (ref 4.7–6.1)
RBC # FLD: 19.1 % — HIGH (ref 11.5–14.5)
SODIUM SERPL-SCNC: 142 MMOL/L — SIGNIFICANT CHANGE UP (ref 135–146)
WBC # BLD: 6.1 K/UL — SIGNIFICANT CHANGE UP (ref 4.8–10.8)
WBC # FLD AUTO: 6.1 K/UL — SIGNIFICANT CHANGE UP (ref 4.8–10.8)

## 2023-06-06 PROCEDURE — 99232 SBSQ HOSP IP/OBS MODERATE 35: CPT

## 2023-06-06 RX ORDER — MAGNESIUM SULFATE 500 MG/ML
2 VIAL (ML) INJECTION
Refills: 0 | Status: COMPLETED | OUTPATIENT
Start: 2023-06-06 | End: 2023-06-06

## 2023-06-06 RX ADMIN — Medication 40 MILLIGRAM(S): at 05:46

## 2023-06-06 RX ADMIN — Medication 25 GRAM(S): at 18:41

## 2023-06-06 RX ADMIN — Medication 25 GRAM(S): at 17:42

## 2023-06-06 RX ADMIN — Medication 1 MILLIGRAM(S): at 12:03

## 2023-06-06 RX ADMIN — Medication 60 MILLIGRAM(S): at 05:44

## 2023-06-06 RX ADMIN — SIMVASTATIN 40 MILLIGRAM(S): 20 TABLET, FILM COATED ORAL at 21:42

## 2023-06-06 RX ADMIN — PANTOPRAZOLE SODIUM 40 MILLIGRAM(S): 20 TABLET, DELAYED RELEASE ORAL at 05:44

## 2023-06-06 NOTE — PROGRESS NOTE ADULT - PROVIDER SPECIALTY LIST ADULT
Hospitalist
Hospitalist
Internal Medicine
Hospitalist
Internal Medicine
Nephrology
Hospitalist
Internal Medicine
Internal Medicine
Nephrology
Hospitalist
Nephrology

## 2023-06-06 NOTE — PROGRESS NOTE ADULT - ASSESSMENT
70 y/o man with PMH of low grade lymphoma, DM type 2, mesenteric lymphangitis dx in 2022 (related to lymphoma and results known to his oncologist), latent TB and on INH, iron deficiency anemia on oral iron and HTN now presents with UE and LE edema and found to have microcytic anemia and MAXX.   previous notes reviewed by me  PMD: Kelsie Nikitanichol    # Microcytic anemia - iron stores are adequate  - s/p 2 units PRBC and Hgb is improved and stable - pt and wife deny BRBPR or melena  - GI consult appreciated - recommend EGD/colonoscopy as outpt   - continue to trend H/H daily   - CT scan of abd/pelvis and chest  -noncontrast - significant lymphadenopathy   - Hem/Onc consullt appreciated - obtain records from previus oncologist; 6/3 spoke to gillian Gandhi [phone] and she said will try to fax pathology report   - keep active T&S  - 6/6 giving 1 UPRBC    #MAXX   - baseline cr = 1-->2.1-->2.2 -->2.3-->1.8  - holding nephrotoxics-  - renal/bladder US: no hydronephrosis  - no hydronephrosis on CT scan  - check FeUrea  - daily BMP  - monitor I's & O's  - nephrology following    # UE and LE edema - improving - EDEMA is thought 2/2 to lymphoma not CHF  - venous duplex LE negative  - Albumin 2.4 (stable from last year)  - ECHO = grade 1 diastolic dysfunction  - continue PO lasix 40mg    # h/o low grade lymphoma  -CT scan chest: Multiple prominent subcentimeter lymph nodes within the prevascular, paraaortic and lower paratracheal regions. Additional prominent lymph   nodes within the bilateral axilla.  -CT scan abd/pelvis: Multiple enlarged mesenteric lymphadenopathy. Additional enlarged retroperitoneal lymph nodes.  seen by HemOnc  -obtain pathology report from [facility] - follow up     # Latent TB   - on isoniazid    #DM II  - A1C 5.6  - outpatient follow up     #HTN   - continue nifedipine XL 60mg daily      Diet: dash/halal  DVT prophylaxis - SCD   code : full    Pending:  obtain records from pt's oncologist, anticipate dc home 6/7 after unit of blood; plan continued lasix diuresis at home and patient will follow up with OP oncology

## 2023-06-06 NOTE — PHYSICAL THERAPY INITIAL EVALUATION ADULT - PERTINENT HX OF CURRENT PROBLEM, REHAB EVAL
72-year-old male w/ hx of lymphoma, necrotic mesenteric lymphadenitis, HLD, DMA, and SHAI, known to Dr. Iglesias (pcp), presents for Upper and lower b/l extremity swelling. Pt reports that he first noticed symptoms 1 week ago and they have progressively worsened. He denies urinary retention dysuria, hematuria, or any previous events of swelling in the extremities. Denies f,c,n,v,sob,cp,abd pain, diarrhea.    In the Ed, vitals 144/65, HR 82, temp 97.6F; labs s/f hgb 6.3, creat 0.7-->2, BUN 48, CXR nonremarkable, EKG NSR, hemolyzed potassium, other unremarkable labs, UA neg for LE, Nit, positive for glucosuria, INR and PTT within normal limits.  Pt received blood transfusion and is being admitted for further workup of acute anemia in the setting of MAXX.

## 2023-06-06 NOTE — PHYSICAL THERAPY INITIAL EVALUATION ADULT - ADDITIONAL COMMENTS
Patient lves with spouse in house with 4 steps to enter. As per patient spouse works 4x/week. Patient claims to be assisted in ADLs by wife before going to work. He was independent in home ambulation using straight cane. Very minimal community ambulation.

## 2023-06-06 NOTE — PROGRESS NOTE ADULT - REASON FOR ADMISSION
Extremity Swelling

## 2023-06-06 NOTE — PROGRESS NOTE ADULT - SUBJECTIVE AND OBJECTIVE BOX
BELLA DIETRICHSHAWN  72y Male    INTERVAL HPI/OVERNIGHT EVENTS:    pt feels OK - no c/o pain, SOB, N/V  no fever  able to ambulate with cane per RN - ambulating better today than yesterday  still with significant LE edema  urinating    T(F): 98.6 (05-28-23 @ 07:31), Max: 98.6 (05-28-23 @ 07:31)  HR: 82 (05-28-23 @ 07:31) (65 - 83)  BP: 133/61 (05-28-23 @ 07:31) (133/61 - 145/65)  RR: 18 (05-28-23 @ 07:31) (18 - 18)  SpO2: 99% (05-28-23 @ 07:31) (99% - 100%) on RA    PHYSICAL EXAM:  GENERAL: NAD  HEAD:  Normocephalic  EYES:  conjunctiva and sclera clear  ENMT: Moist mucous membranes  NERVOUS SYSTEM:  Alert, awake, Good concentration  CHEST/LUNG: decreased BS at bases b/l  HEART: Regular rate and rhythm  ABDOMEN: Soft, Nontender, Nondistended  EXTREMITIES: 2+ UE and 3+ LE edema  SKIN: warm, dry    Consultant(s) Notes Reviewed:  [x ] YES  [ ] NO  Care Discussed with Consultants/Other Providers [ x] YES  [ ] NO    MEDICATIONS  (STANDING):  folic acid 1 milliGRAM(s) Oral daily  furosemide    Tablet 20 milliGRAM(s) Oral daily  iron sucrose IVPB 200 milliGRAM(s) IV Intermittent every 24 hours  isoniazid 300 milliGRAM(s) Oral daily  NIFEdipine XL 30 milliGRAM(s) Oral daily  pantoprazole    Tablet 40 milliGRAM(s) Oral before breakfast  simvastatin 40 milliGRAM(s) Oral at bedtime    MEDICATIONS  (PRN):  acetaminophen     Tablet .. 650 milliGRAM(s) Oral every 6 hours PRN Temp greater or equal to 38C (100.4F), Mild Pain (1 - 3)  albuterol    90 MICROgram(s) HFA Inhaler 1 Puff(s) Inhalation every 8 hours PRN Shortness of Breath and/or Wheezing  aluminum hydroxide/magnesium hydroxide/simethicone Suspension 30 milliLiter(s) Oral every 4 hours PRN Dyspepsia  melatonin 3 milliGRAM(s) Oral at bedtime PRN Insomnia  ondansetron Injectable 4 milliGRAM(s) IV Push every 8 hours PRN Nausea and/or Vomiting      LABS:                        8.5    6.12  )-----------( 250      ( 28 May 2023 11:00 )             26.8     05-28    133<L>  |  105  |  36<H>  ----------------------------<  188<H>  4.7   |  21  |  1.7<H>    Ca    7.6<L>      28 May 2023 11:00  Mg     1.8     05-27    TPro  4.3<L>  /  Alb  2.3<L>  /  TBili  <0.2  /  DBili  x   /  AST  49<H>  /  ALT  15  /  AlkPhos  53  05-27      < from: US Kidney and Bladder (05.27.23 @ 16:26) >  IMPRESSION:    No sonographic evidence of hydronephrosis.    < end of copied text >          Care discussed with pt's family        
24H events:    Patient is a 72y old Male who presents with a chief complaint of Extremity Swelling (26 May 2023 04:06)    Primary diagnosis of MAXX (acute kidney injury)       Today is hospital day 1d.      Patient seen and examined at bedside. Reports no active complaints.     PAST MEDICAL & SURGICAL HISTORY  Diabetes mellitus    Hyperlipidemia    Lymphoma    No significant past surgical history      SOCIAL HISTORY:  Negative for smoking/alcohol/drug use.     ALLERGIES:  No Known Allergies    MEDICATIONS:  STANDING MEDICATIONS  simvastatin 40 milliGRAM(s) Oral at bedtime    PRN MEDICATIONS  acetaminophen     Tablet .. 650 milliGRAM(s) Oral every 6 hours PRN  albuterol    90 MICROgram(s) HFA Inhaler 1 Puff(s) Inhalation every 8 hours PRN  aluminum hydroxide/magnesium hydroxide/simethicone Suspension 30 milliLiter(s) Oral every 4 hours PRN  melatonin 3 milliGRAM(s) Oral at bedtime PRN  ondansetron Injectable 4 milliGRAM(s) IV Push every 8 hours PRN    VITALS:   T(F): 98  HR: 78  BP: 157/68  RR: 18  SpO2: 97%    LABS:                        9.3    6.08  )-----------( 280      ( 26 May 2023 08:00 )             28.8     05-    137  |  110  |  44<H>  ----------------------------<  72  4.8   |  19  |  2.0<H>    Ca    8.3<L>      26 May 2023 08:00  Mg     1.8     -    TPro  4.4<L>  /  Alb  2.3<L>  /  TBili  0.2  /  DBili  x   /  AST  45<H>  /  ALT  15  /  AlkPhos  57  05-    PT/INR - ( 26 May 2023 08:00 )   PT: 11.10 sec;   INR: 0.97 ratio         PTT - ( 26 May 2023 08:00 )  PTT:30.8 sec  Urinalysis Basic - ( 25 May 2023 21:46 )    Color: Light Yellow / Appearance: Clear / S.012 / pH: x  Gluc: x / Ketone: Negative  / Bili: Negative / Urobili: <2 mg/dL   Blood: x / Protein: Trace / Nitrite: Negative   Leuk Esterase: Negative / RBC: 3 /HPF / WBC 1 /HPF   Sq Epi: x / Non Sq Epi: x / Bacteria: Negative        Troponin T, Serum: 0.06 ng/mL *HH* (23 @ 08:00)      CARDIAC MARKERS ( 26 May 2023 08:00 )  x     / 0.06 ng/mL / x     / x     / x          RADIOLOGY:    PHYSICAL EXAM:  GENERAL: NAD, lying in bed comfortably, receiving blood transfusion  HEAD:  Atraumatic, normocephalic  EYES: EOMI, PERRLA, conjunctiva pale, and sclera clear  ENT: Moist mucous membranes  NECK: Supple, no JVD  HEART: Regular rate and rhythm, no murmurs, rubs, or gallops  LUNGS: Unlabored respirations.  Clear to auscultation bilaterally, no crackles, wheezing, or rhonchi  ABDOMEN: Soft, nontender, nondistended, +BS  EXTREMITIES: b/l upper and lower extremity swelling  NERVOUS SYSTEM:  A&Ox3, no focal deficits   SKIN: No rashes or lesions    
Nephrology Progress Note    ROSS DIETRICH  MRN-[id number]  72y  Male    S:  Patient is seen and examined, events over the last 24h noted.    O:  Allergies:  No Known Allergies    Hospital Medications:   MEDICATIONS  (STANDING):  folic acid 1 milliGRAM(s) Oral daily  furosemide   Injectable 40 milliGRAM(s) IV Push daily  NIFEdipine XL 60 milliGRAM(s) Oral daily  pantoprazole    Tablet 40 milliGRAM(s) Oral before breakfast  simvastatin 40 milliGRAM(s) Oral at bedtime    MEDICATIONS  (PRN):  acetaminophen     Tablet .. 650 milliGRAM(s) Oral every 6 hours PRN Temp greater or equal to 38C (100.4F), Mild Pain (1 - 3)  albuterol    90 MICROgram(s) HFA Inhaler 1 Puff(s) Inhalation every 8 hours PRN Shortness of Breath and/or Wheezing  aluminum hydroxide/magnesium hydroxide/simethicone Suspension 30 milliLiter(s) Oral every 4 hours PRN Dyspepsia  melatonin 3 milliGRAM(s) Oral at bedtime PRN Insomnia  ondansetron Injectable 4 milliGRAM(s) IV Push every 8 hours PRN Nausea and/or Vomiting    Home Medications:  Albuterol (Eqv-ProAir HFA) 90 mcg/inh inhalation aerosol: 1 puff(s) inhaled every 6 hours (26 May 2023 12:53)  aspirin 81 mg oral tablet: 1 tab(s) orally once a day (26 May 2023 12:59)  famotidine 40 mg oral tablet: 1 tab(s) orally once a day (at bedtime) (26 May 2023 12:59)  ferrous sulfate 325 mg (65 mg elemental iron) oral tablet: BID (26 May 2023 12:59)  folic acid 1 mg oral tablet: 1 tab(s) orally once a day (26 May 2023 12:59)  isoniazid 300 mg oral tablet: 1 tab(s) orally once a day (26 May 2023 12:58)  Lasix 20 mg oral tablet: 1 tab(s) orally once a day (26 May 2023 12:59)  losartan 25 mg oral tablet: 1 tab(s) orally once a day (26 May 2023 12:59)  pioglitazone 30 mg oral tablet: 1 tab(s) orally once a day (26 May 2023 12:59)  Synjardy 12.5 mg-1000 mg oral tablet: 1 tab(s) orally 2 times a day (26 May 2023 12:59)  Trulicity Pen 1.5 mg/0.5 mL subcutaneous solution:  (26 May 2023 12:59)      VITALS:  Daily Weight in k.5 (2023 05:20)  T(F): 97.1 (23 @ 08:18), Max: 97.6 (23 @ 00:23)  HR: 81 (23 @ 08:18)  BP: 147/60 (23 @ 08:18)  RR: 18 (23 @ 08:18)  SpO2: 97% (23 @ 08:18)  Wt(kg): --  I&O's Detail    2023 07:  -  2023 07:00  --------------------------------------------------------  IN:    Oral Fluid: 850 mL  Total IN: 850 mL    OUT:    Voided (mL): 700 mL  Total OUT: 700 mL    Total NET: 150 mL      2023 07:  -  2023 14:22  --------------------------------------------------------  IN:    Oral Fluid: 720 mL  Total IN: 720 mL    OUT:  Total OUT: 0 mL    Total NET: 720 mL        I&O's Summary    2023 07:01  -  2023 07:00  --------------------------------------------------------  IN: 850 mL / OUT: 700 mL / NET: 150 mL    2023 07:01  -  2023 14:22  --------------------------------------------------------  IN: 720 mL / OUT: 0 mL / NET: 720 mL          PHYSICAL EXAM:  Gen: NAD  Resp: b/l breath sounds  Card: S1/S2  Abd: soft  Extremities: edema      LABS:        143  |  107  |  39<H>  ----------------------------<  114<H>  3.9   |  28  |  2.3<H>    Ca    8.1<L>      2023 07:09  Mg     1.8         TPro  4.4<L>  /  Alb  2.3<L>  /  TBili  0.2  /  DBili      /  AST  72<H>  /  ALT  25  /  AlkPhos  182<H>        Phosphorus Level, Serum: 3.4 mg/dL (23 @ 07:16)  Phosphorus Level, Serum: 3.6 mg/dL (23 @ 17:37)                            7.6    6.26  )-----------( 315      ( 2023 07:09 )             23.9     Mean Cell Volume: 83.6 fL (23 @ 07:09)    Iron Total, Serum: 100 ug/dL (23 @ 10:32)  % Saturation, Iron: 33 % (23 @ 10:32)      Urine Studies:        Creatinine, Random Urine: 24 mg/dL ( @ 16:40)  Protein/Creatinine Ratio Calculation: 0.5 Ratio ( @ 16:40)    Creatinine trend:  Creatinine, Serum: 2.3 mg/dL (23 @ 07:09)  Creatinine, Serum: 2.2 mg/dL (23 @ 08:21)  Creatinine, Serum: 2.1 mg/dL (23 @ 07:16)  Creatinine, Serum: 2.2 mg/dL (23 @ 17:42)  Creatinine, Serum: 2.1 mg/dL (23 @ 07:14)  Creatinine, Serum: 2.0 mg/dL (23 @ 06:57)
Nephrology progress note    Patient was seen and examined, events over the last 24 h noted .  CR stable    Allergies:  No Known Allergies    Hospital Medications:   MEDICATIONS  (STANDING):  folic acid 1 milliGRAM(s) Oral daily  furosemide    Tablet 40 milliGRAM(s) Oral daily  furosemide   Injectable 40 milliGRAM(s) IV Push once  magnesium sulfate  IVPB 2 Gram(s) IV Intermittent every 2 hours  NIFEdipine XL 60 milliGRAM(s) Oral daily  pantoprazole    Tablet 40 milliGRAM(s) Oral before breakfast  simvastatin 40 milliGRAM(s) Oral at bedtime        VITALS:  T(F): 97.8 (06-02-23 @ 07:10), Max: 98 (06-01-23 @ 23:36)  HR: 92 (06-02-23 @ 07:10)  BP: 140/62 (06-02-23 @ 07:10)  RR: 18 (06-02-23 @ 07:10)  SpO2: 98% (06-01-23 @ 15:05)  Wt(kg): --    06-01 @ 07:01  -  06-02 @ 07:00  --------------------------------------------------------  IN: 840 mL / OUT: 1150 mL / NET: -310 mL    06-02 @ 07:01  -  06-02 @ 11:02  --------------------------------------------------------  IN: 400 mL / OUT: 200 mL / NET: 200 mL          PHYSICAL EXAM:  Constitutional: NAD  HEENT: anicteric sclera, oropharynx clear, MMM  Neck: No JVD  Respiratory: CTAB, no wheezes, rales or rhonchi  Cardiovascular: S1, S2, RRR  Gastrointestinal: BS+, soft, NT/ND  Extremities: No cyanosis or clubbing. No peripheral edema  :  No ramirez.   Skin: No rashes    LABS:  06-02    143  |  107  |  41<H>  ----------------------------<  114<H>  4.5   |  25  |  2.1<H>    Ca    8.3<L>      02 Jun 2023 07:16  Phos  3.4     06-02  Mg     1.8     06-02    TPro  4.9<L>  /  Alb  2.7<L>  /  TBili  0.3  /  DBili      /  AST  54<H>  /  ALT  16  /  AlkPhos  100  06-02                          8.2    9.80  )-----------( 289      ( 02 Jun 2023 07:16 )             26.2       Urine Studies:    Creatinine, Random Urine: 24 mg/dL (05-30 @ 16:40)  Protein/Creatinine Ratio Calculation: 0.5 Ratio (05-30 @ 16:40)  Sodium, Random Urine: 74.0 mmoL/L (05-26 @ 16:56)  Chloride, Random Urine: 71 (05-26 @ 16:56)  Potassium, Random Urine: 22 mmol/L (05-26 @ 16:56)  Creatinine, Random Urine: 32 mg/dL (05-26 @ 16:56)    RADIOLOGY & ADDITIONAL STUDIES:  
Patient is a 72y old  Male who presents with a chief complaint of Extremity Swelling (02 Jun 2023 15:07)      Patient seen and examined at bedside.  Patient denies any chest pain or shortness of breath   ALLERGIES:  No Known Allergies    MEDICATIONS:  acetaminophen     Tablet .. 650 milliGRAM(s) Oral every 6 hours PRN  albuterol    90 MICROgram(s) HFA Inhaler 1 Puff(s) Inhalation every 8 hours PRN  aluminum hydroxide/magnesium hydroxide/simethicone Suspension 30 milliLiter(s) Oral every 4 hours PRN  folic acid 1 milliGRAM(s) Oral daily  magnesium sulfate  IVPB 2 Gram(s) IV Intermittent every 2 hours  melatonin 3 milliGRAM(s) Oral at bedtime PRN  NIFEdipine XL 60 milliGRAM(s) Oral daily  ondansetron Injectable 4 milliGRAM(s) IV Push every 8 hours PRN  pantoprazole    Tablet 40 milliGRAM(s) Oral before breakfast  simvastatin 40 milliGRAM(s) Oral at bedtime    Vital Signs Last 24 Hrs  T(F): 99.5 (02 Jun 2023 16:00), Max: 99.5 (02 Jun 2023 16:00)  HR: 90 (02 Jun 2023 16:00) (82 - 92)  BP: 140/65 (02 Jun 2023 16:00) (140/62 - 143/63)  RR: 18 (02 Jun 2023 16:00) (18 - 18)  SpO2: --  I&O's Summary    01 Jun 2023 07:01  -  02 Jun 2023 07:00  --------------------------------------------------------  IN: 840 mL / OUT: 1150 mL / NET: -310 mL    02 Jun 2023 07:01  -  02 Jun 2023 17:40  --------------------------------------------------------  IN: 1000 mL / OUT: 900 mL / NET: 100 mL        PHYSICAL EXAM:  General: NAD, A/O x 3  ENT: MMM  Neck: Supple, No JVD  Lungs: Clear to auscultation bilaterally  Cardio: RRR, S1/S2, 2/6 systolic murmur   Abdomen: Soft, Nontender, Nondistended; Bowel sounds present  Extremities: No cyanosis, +2-+3 edema all limbs     LABS:                        8.2    9.80  )-----------( 289      ( 02 Jun 2023 07:16 )             26.2     06-02    143  |  107  |  41  ----------------------------<  114  4.5   |  25  |  2.1    Ca    8.3      02 Jun 2023 07:16  Phos  3.4     06-02  Mg     1.8     06-02    TPro  4.9  /  Alb  2.7  /  TBili  0.3  /  DBili  x   /  AST  54  /  ALT  16  /  AlkPhos  100  06-02              05-27 Chol 88 mg/dL LDL -- HDL 37 mg/dL Trig 128 mg/dL              POCT Blood Glucose.: 185 mg/dL (01 Jun 2023 21:10)              RADIOLOGY & ADDITIONAL TESTS:    Care Discussed with Consultants/Other Providers: 
Patient is a 72y old  Male who presents with a chief complaint of Extremity Swelling (31 May 2023 14:23)      Patient seen and examined at bedside.  Patient denies any chest pain or shortness of breath  ALLERGIES:  No Known Allergies    MEDICATIONS:  acetaminophen     Tablet .. 650 milliGRAM(s) Oral every 6 hours PRN  albuterol    90 MICROgram(s) HFA Inhaler 1 Puff(s) Inhalation every 8 hours PRN  aluminum hydroxide/magnesium hydroxide/simethicone Suspension 30 milliLiter(s) Oral every 4 hours PRN  folic acid 1 milliGRAM(s) Oral daily  melatonin 3 milliGRAM(s) Oral at bedtime PRN  ondansetron Injectable 4 milliGRAM(s) IV Push every 8 hours PRN  pantoprazole    Tablet 40 milliGRAM(s) Oral before breakfast  simvastatin 40 milliGRAM(s) Oral at bedtime    Vital Signs Last 24 Hrs  T(F): 98 (31 May 2023 15:41), Max: 98 (31 May 2023 15:41)  HR: 88 (31 May 2023 15:41) (85 - 93)  BP: 128/60 (31 May 2023 15:41) (128/60 - 171/75)  RR: 18 (31 May 2023 15:41) (18 - 18)  SpO2: 98% (31 May 2023 08:00) (98% - 99%)  I&O's Summary    30 May 2023 07:01  -  31 May 2023 07:00  --------------------------------------------------------  IN: 0 mL / OUT: 900 mL / NET: -900 mL        PHYSICAL EXAM:  General: NAD, A/O x 3  ENT: MMM  Neck: Supple, No JVD  Lungs: basal crackles  Cardio: RRR, S1/S2, No murmurs  Abdomen: Soft, Nontender, Nondistended; Bowel sounds present  Extremities: No cyanosis, +3 LE edema     LABS:                        8.4    7.98  )-----------( 250      ( 31 May 2023 06:57 )             27.0     05-31    140  |  108  |  34  ----------------------------<  103  4.7   |  26  |  2.0    Ca    8.2      31 May 2023 06:57  Phos  3.6     05-30  Mg     1.8     05-31    TPro  4.4  /  Alb  2.5  /  TBili  0.2  /  DBili  x   /  AST  46  /  ALT  14  /  AlkPhos  61  05-31 05-27 Chol 88 mg/dL LDL -- HDL 37 mg/dL Trig 128 mg/dL                          RADIOLOGY & ADDITIONAL TESTS:    Care Discussed with Consultants/Other Providers: 
SUBJECTIVE:    Patient is a 72y old Male who presents with a chief complaint of Extremity Swelling (05 Jun 2023 14:22)    Currently admitted to medicine with the primary diagnosis of:    Today is hospital day 11d.     Overnight Events:     still present edema     PAST MEDICAL & SURGICAL HISTORY  Diabetes mellitus    Hyperlipidemia    Lymphoma    No significant past surgical history      ALLERGIES:  No Known Allergies    MEDICATIONS:  STANDING MEDICATIONS  folic acid 1 milliGRAM(s) Oral daily  NIFEdipine XL 60 milliGRAM(s) Oral daily  pantoprazole    Tablet 40 milliGRAM(s) Oral before breakfast  simvastatin 40 milliGRAM(s) Oral at bedtime    PRN MEDICATIONS  acetaminophen     Tablet .. 650 milliGRAM(s) Oral every 6 hours PRN  albuterol    90 MICROgram(s) HFA Inhaler 1 Puff(s) Inhalation every 8 hours PRN  aluminum hydroxide/magnesium hydroxide/simethicone Suspension 30 milliLiter(s) Oral every 4 hours PRN  melatonin 3 milliGRAM(s) Oral at bedtime PRN  ondansetron Injectable 4 milliGRAM(s) IV Push every 8 hours PRN    VITALS:   ICU Vital Signs Last 24 Hrs  T(C): 36.2 (05 Jun 2023 08:18), Max: 36.4 (05 Jun 2023 00:23)  T(F): 97.1 (05 Jun 2023 08:18), Max: 97.6 (05 Jun 2023 00:23)  HR: 81 (05 Jun 2023 08:18) (81 - 97)  BP: 147/60 (05 Jun 2023 08:18) (105/53 - 154/69)  BP(mean): --  ABP: --  ABP(mean): --  RR: 18 (05 Jun 2023 08:18) (18 - 18)  SpO2: 97% (05 Jun 2023 08:18) (97% - 97%)    O2 Parameters below as of 05 Jun 2023 08:18  Patient On (Oxygen Delivery Method): room air            LABS:                        7.6    6.26  )-----------( 315      ( 05 Jun 2023 07:09 )             23.9     06-05    143  |  107  |  39<H>  ----------------------------<  114<H>  3.9   |  28  |  2.3<H>    Ca    8.1<L>      05 Jun 2023 07:09  Mg     1.8     06-05    TPro  4.4<L>  /  Alb  2.3<L>  /  TBili  0.2  /  DBili  x   /  AST  72<H>  /  ALT  25  /  AlkPhos  182<H>  06-05                    RADIOLOGY:  < from: Xray Chest 1 View- PORTABLE-Urgent (Xray Chest 1 View- PORTABLE-Urgent .) (06.03.23 @ 09:13) >  No radiographic evidence of acute cardiopulmonary disease.    < end of copied text >    PHYSICAL EXAM:  GEN: laying in bed  LUNGS: clear   HEART: s1 s2  ABD: nontender  EXT: lower extremity edema  NEURO: ao3 
seen and examined  24 h events noted   no new complaints       PAST HISTORY  --------------------------------------------------------------------------------  No significant changes to PMH, PSH, FHx, SHx, unless otherwise noted    ALLERGIES & MEDICATIONS  --------------------------------------------------------------------------------  Allergies    No Known Allergies    Intolerances      Standing Inpatient Medications  folic acid 1 milliGRAM(s) Oral daily  furosemide   Injectable 60 milliGRAM(s) IV Push daily  iron sucrose IVPB 200 milliGRAM(s) IV Intermittent every 24 hours  isoniazid 300 milliGRAM(s) Oral daily  NIFEdipine XL 30 milliGRAM(s) Oral daily  pantoprazole    Tablet 40 milliGRAM(s) Oral before breakfast  simvastatin 40 milliGRAM(s) Oral at bedtime    PRN Inpatient Medications  acetaminophen     Tablet .. 650 milliGRAM(s) Oral every 6 hours PRN  albuterol    90 MICROgram(s) HFA Inhaler 1 Puff(s) Inhalation every 8 hours PRN  aluminum hydroxide/magnesium hydroxide/simethicone Suspension 30 milliLiter(s) Oral every 4 hours PRN  melatonin 3 milliGRAM(s) Oral at bedtime PRN  ondansetron Injectable 4 milliGRAM(s) IV Push every 8 hours PRN        VITALS/PHYSICAL EXAM  --------------------------------------------------------------------------------  T(C): 36.6 (05-30-23 @ 05:21), Max: 36.7 (05-29-23 @ 23:40)  HR: 88 (05-30-23 @ 05:21) (87 - 88)  BP: 132/76 (05-30-23 @ 05:21) (132/76 - 142/63)  RR: 16 (05-30-23 @ 05:21) (16 - 18)  SpO2: 98% (05-30-23 @ 05:21) (98% - 98%)  Wt(kg): --        05-29-23 @ 07:01  -  05-30-23 @ 07:00  --------------------------------------------------------  IN: 0 mL / OUT: 1250 mL / NET: -1250 mL      Physical Exam:  	Gen: NAD,   	Pulm: decrease BS  B/L  	CV:  S1S2; no rub  	Abd: +distended  	LE:  edema      LABS/STUDIES  --------------------------------------------------------------------------------              9.0    8.53  >-----------<  277      [05-30-23 @ 07:36]              29.3     141  |  111  |  34  ----------------------------<  144      [05-29-23 @ 06:33]  4.9   |  22  |  1.8        Ca     7.9     [05-29-23 @ 06:33]      Phos  3.9     [05-29-23 @ 06:33]        Uric acid 7.8      [05-29-23 @ 06:33]  Troponin 0.04      [05-28-23 @ 11:00]    Creatinine Trend:  SCr 1.8 [05-29 @ 06:33]  SCr 1.7 [05-28 @ 11:00]  SCr 1.9 [05-27 @ 07:14]  SCr 2.0 [05-26 @ 08:00]  SCr 2.0 [05-25 @ 18:22]    Urinalysis - [05-25-23 @ 21:46]      Color Light Yellow / Appearance Clear / SG 1.012 / pH 5.5      Gluc 500 mg/dL / Ketone Negative  / Bili Negative / Urobili <2 mg/dL       Blood Small / Protein Trace / Leuk Est Negative / Nitrite Negative      RBC 3 / WBC 1 / Hyaline 2 / Gran  / Sq Epi  / Non Sq Epi  / Bacteria Negative    Urine Creatinine 32      [05-26-23 @ 16:56]  Urine Protein 22      [05-26-23 @ 16:56]  Urine Sodium 74.0      [05-26-23 @ 16:56]  Urine Potassium 22      [05-26-23 @ 16:56]  Urine Chloride 71      [05-26-23 @ 16:56]    Iron 100, TIBC 307, %sat 33      [05-26-23 @ 10:32]  Ferritin 19      [05-26-23 @ 10:32]  Lipid: chol 88, , HDL 37, LDL --      [05-27-23 @ 07:14]      
  ROSS DIETRICH  72y Male    INTERVAL HPI/OVERNIGHT EVENTS:  Patient seen and examined this morning  lying in bed  in nad  states his extremity edema hasn't improved    PHYSICAL EXAM:  GENERAL: NAD  HEAD:  Normocephalic  EYES:  conjunctiva and sclera clear  ENMT: Moist mucous membranes  NERVOUS SYSTEM:  Alert, awake, Good concentration  CHEST/LUNG: decreased BS b/l but clear  HEART: Regular rate and rhythm  ABDOMEN: Soft, Nontender, Nondistended; Bowel sounds present  EXTREMITIES:  2+ UE and 3+ LE edema bilaterally   SKIN: warm, dry    Consultant(s) Notes Reviewed:  [x ] YES  [ ] NO  Care Discussed with Consultants/Other Providers [ x] YES  [ ] NO      LABS:                                   9.0    8.53  )-----------( 277      ( 30 May 2023 07:36 )             29.3       05-30    140  |  107  |  33<H>  ----------------------------<  118<H>  4.5   |  24  |  1.8<H>    Ca    7.9<L>      30 May 2023 07:36  Phos  3.9     05-29  Mg     1.5     05-30    TPro  4.7<L>  /  Alb  2.4<L>  /  TBili  0.2  /  DBili  x   /  AST  52<H>  /  ALT  16  /  AlkPhos  64  05-30        RADIOLOGY & ADDITIONAL TESTS:    Imaging or report Personally Reviewed:  [ x] YES  [ ] NO    < from: TTE Echo Complete w/o Contrast w/ Doppler (05.28.23 @ 13:27) >  Summary:   1. LV Ejection Fraction by Bello's Method with a biplane EF of 59 %.   2. Normal global left ventricular systolic function.   3. Spectral Doppler shows impaired relaxation pattern of left   ventricular myocardial filling (Grade I diastolic dysfunction).   4. Moderately increased LV wall thickness.   5. Normal left atrial size.   6. Normal right atrial size.    < end of copied text >      MEDICATIONS  (STANDING):  folic acid 1 milliGRAM(s) Oral daily  furosemide   Injectable 60 milliGRAM(s) IV Push daily  iron sucrose IVPB 200 milliGRAM(s) IV Intermittent every 24 hours  isoniazid 300 milliGRAM(s) Oral daily  NIFEdipine XL 30 milliGRAM(s) Oral daily  pantoprazole    Tablet 40 milliGRAM(s) Oral before breakfast  simvastatin 40 milliGRAM(s) Oral at bedtime    MEDICATIONS  (PRN):  acetaminophen     Tablet .. 650 milliGRAM(s) Oral every 6 hours PRN Temp greater or equal to 38C (100.4F), Mild Pain (1 - 3)  albuterol    90 MICROgram(s) HFA Inhaler 1 Puff(s) Inhalation every 8 hours PRN Shortness of Breath and/or Wheezing  aluminum hydroxide/magnesium hydroxide/simethicone Suspension 30 milliLiter(s) Oral every 4 hours PRN Dyspepsia  melatonin 3 milliGRAM(s) Oral at bedtime PRN Insomnia  ondansetron Injectable 4 milliGRAM(s) IV Push every 8 hours PRN Nausea and/or Vomiting      
  BELLA DIETRICHSHAWN  72y Male    INTERVAL HPI/OVERNIGHT EVENTS:    pt feels well - has no complaints  feels that UE edema is slightly improved  no fever, SOB, chest pain, N/V, abdominal pain    T(F): 97.3 (05-27-23 @ 05:11), Max: 98 (05-26-23 @ 23:44)  HR: 79 (05-27-23 @ 05:11) (78 - 84)  BP: 145/65 (05-27-23 @ 05:11) (145/65 - 169/75)  RR: 18 (05-27-23 @ 05:11) (18 - 18)  SpO2: 99% (05-27-23 @ 05:11) (97% - 99%) on RA    PHYSICAL EXAM:  GENERAL: NAD  HEAD:  Normocephalic  EYES:  pale conjunctiva   ENMT: Moist mucous membranes  NERVOUS SYSTEM:  Alert, awake, Good concentration  CHEST/LUNG: CTA b/l  HEART: Regular rate and rhythm  ABDOMEN: Soft, Nontender, Nondistended  EXTREMITIES: 2+ UE edema and 2-3+ LE edema  SKIN: warm, dry    Consultant(s) Notes Reviewed:  [x ] YES  [ ] NO  Care Discussed with Consultants/Other Providers [ x] YES  [ ] NO    MEDICATIONS  (STANDING):  ferrous    sulfate 325 milliGRAM(s) Oral two times a day  folic acid 1 milliGRAM(s) Oral daily  furosemide    Tablet 20 milliGRAM(s) Oral daily  isoniazid 300 milliGRAM(s) Oral daily  losartan 25 milliGRAM(s) Oral daily  NIFEdipine XL 30 milliGRAM(s) Oral daily  simvastatin 40 milliGRAM(s) Oral at bedtime    MEDICATIONS  (PRN):  acetaminophen     Tablet .. 650 milliGRAM(s) Oral every 6 hours PRN Temp greater or equal to 38C (100.4F), Mild Pain (1 - 3)  albuterol    90 MICROgram(s) HFA Inhaler 1 Puff(s) Inhalation every 8 hours PRN Shortness of Breath and/or Wheezing  aluminum hydroxide/magnesium hydroxide/simethicone Suspension 30 milliLiter(s) Oral every 4 hours PRN Dyspepsia  melatonin 3 milliGRAM(s) Oral at bedtime PRN Insomnia  ondansetron Injectable 4 milliGRAM(s) IV Push every 8 hours PRN Nausea and/or Vomiting      LABS:                        9.5    7.48  )-----------( 288      ( 27 May 2023 07:14 )             29.6     05-27    132<L>  |  106  |  39<H>  ----------------------------<  86  4.7   |  19  |  1.9<H>    Ca    8.0<L>      27 May 2023 07:14  Mg     1.8     05-27    TPro  4.3<L>  /  Alb  2.3<L>  /  TBili  <0.2  /  DBili  x   /  AST  49<H>  /  ALT  15  /  AlkPhos  53  05-27    PT/INR - ( 26 May 2023 08:00 )   PT: 11.10 sec;   INR: 0.97 ratio         PTT - ( 26 May 2023 08:00 )  PTT:30.8 sec      RADIOLOGY & ADDITIONAL TESTS:    Imaging or report Personally Reviewed:  [ x] YES  [ ] NO    < from: CT Chest No Cont (05.27.23 @ 11:41) >    IMPRESSION:    Multiple prominent subcentimeter lymph nodes within the prevascular,   paraaortic and lower paratracheal regions. Additional prominent lymph   nodes within the bilateral axilla.    < end of copied text >      < from: CT Abdomen and Pelvis No Cont (05.26.23 @ 17:29) >    IMPRESSION:    No CT evidence of retroperitoneal hemorrhage.    Multiple enlarged mesenteric lymphadenopathy. Additional enlarged   retroperitoneal lymph nodes.    < end of copied text >      Case discussed with resident and RN today    Care discussed with pt        
  ROSS DIETRICH  72y Male    INTERVAL HPI/OVERNIGHT EVENTS:    no new complaints  no fever, SOB, pain  remains edematous    T(F): 97.1 (05-29-23 @ 07:57), Max: 97.4 (05-28-23 @ 15:30)  HR: 85 (05-29-23 @ 07:57) (70 - 98)  BP: 156/70 (05-29-23 @ 07:57) (138/62 - 156/70)  RR: 18 (05-29-23 @ 07:57) (18 - 18)  SpO2: 99% (05-29-23 @ 05:00) (98% - 100%) on RA    I&O's Summary    28 May 2023 07:01  -  29 May 2023 07:00  --------------------------------------------------------  IN: 0 mL / OUT: 1750 mL / NET: -1750 mL    29 May 2023 07:01  -  29 May 2023 14:15  --------------------------------------------------------  IN: 0 mL / OUT: 450 mL / NET: -450 mL      PHYSICAL EXAM:  GENERAL: NAD  HEAD:  Normocephalic  EYES:  conjunctiva and sclera clear  ENMT: Moist mucous membranes  NERVOUS SYSTEM:  Alert, awake, Good concentration  CHEST/LUNG: decreased BS b/l but clear  HEART: Regular rate and rhythm  ABDOMEN: Soft, Nontender, Nondistended; Bowel sounds present  EXTREMITIES:  2+ UE and 3+ LE edema  SKIN: warm, dry    Consultant(s) Notes Reviewed:  [x ] YES  [ ] NO  Care Discussed with Consultants/Other Providers [ x] YES  [ ] NO    MEDICATIONS  (STANDING):  folic acid 1 milliGRAM(s) Oral daily  iron sucrose IVPB 200 milliGRAM(s) IV Intermittent every 24 hours  isoniazid 300 milliGRAM(s) Oral daily  NIFEdipine XL 30 milliGRAM(s) Oral daily  pantoprazole    Tablet 40 milliGRAM(s) Oral before breakfast  simvastatin 40 milliGRAM(s) Oral at bedtime    MEDICATIONS  (PRN):  acetaminophen     Tablet .. 650 milliGRAM(s) Oral every 6 hours PRN Temp greater or equal to 38C (100.4F), Mild Pain (1 - 3)  albuterol    90 MICROgram(s) HFA Inhaler 1 Puff(s) Inhalation every 8 hours PRN Shortness of Breath and/or Wheezing  aluminum hydroxide/magnesium hydroxide/simethicone Suspension 30 milliLiter(s) Oral every 4 hours PRN Dyspepsia  melatonin 3 milliGRAM(s) Oral at bedtime PRN Insomnia  ondansetron Injectable 4 milliGRAM(s) IV Push every 8 hours PRN Nausea and/or Vomiting      LABS:                        8.6    6.83  )-----------( 242      ( 29 May 2023 06:33 )             27.6     05-29    141  |  111<H>  |  34<H>  ----------------------------<  144<H>  4.9   |  22  |  1.8<H>    Ca    7.9<L>      29 May 2023 06:33  Phos  3.9     05-29            RADIOLOGY & ADDITIONAL TESTS:    Imaging or report Personally Reviewed:  [ x] YES  [ ] NO    < from: TTE Echo Complete w/o Contrast w/ Doppler (05.28.23 @ 13:27) >  Summary:   1. LV Ejection Fraction by Bello's Method with a biplane EF of 59 %.   2. Normal global left ventricular systolic function.   3. Spectral Doppler shows impaired relaxation pattern of left   ventricular myocardial filling (Grade I diastolic dysfunction).   4. Moderately increased LV wall thickness.   5. Normal left atrial size.   6. Normal right atrial size.    < end of copied text >      Case discussed with residentsand RN today    Care discussed with pt        
  seen and examined   24 h events noted   no distress        PAST HISTORY  --------------------------------------------------------------------------------  No significant changes to PMH, PSH, FHx, SHx, unless otherwise noted    ALLERGIES & MEDICATIONS  --------------------------------------------------------------------------------  Allergies    No Known Allergies    Intolerances      Standing Inpatient Medications  folic acid 1 milliGRAM(s) Oral daily  furosemide   Injectable 40 milliGRAM(s) IV Push daily  magnesium sulfate  IVPB 2 Gram(s) IV Intermittent every 2 hours  NIFEdipine XL 60 milliGRAM(s) Oral daily  pantoprazole    Tablet 40 milliGRAM(s) Oral before breakfast  simvastatin 40 milliGRAM(s) Oral at bedtime    PRN Inpatient Medications  acetaminophen     Tablet .. 650 milliGRAM(s) Oral every 6 hours PRN  albuterol    90 MICROgram(s) HFA Inhaler 1 Puff(s) Inhalation every 8 hours PRN  aluminum hydroxide/magnesium hydroxide/simethicone Suspension 30 milliLiter(s) Oral every 4 hours PRN  melatonin 3 milliGRAM(s) Oral at bedtime PRN  ondansetron Injectable 4 milliGRAM(s) IV Push every 8 hours PRN        VITALS/PHYSICAL EXAM  --------------------------------------------------------------------------------  T(C): 36.6 (06-03-23 @ 07:40), Max: 37.5 (06-02-23 @ 16:00)  HR: 84 (06-03-23 @ 07:40) (84 - 90)  BP: 157/70 (06-03-23 @ 07:40) (140/65 - 161/70)  RR: 18 (06-03-23 @ 07:40) (18 - 18)  SpO2: --  Wt(kg): --        06-02-23 @ 07:01  -  06-03-23 @ 07:00  --------------------------------------------------------  IN: 1100 mL / OUT: 1100 mL / NET: 0 mL      Physical Exam:  	Gen: NAD,  	Pulm: decrease BS  B/L  	CV:  S1S2; no rub  	Abd: soft, nontender/nondistended  	LE:  edema      LABS/STUDIES  --------------------------------------------------------------------------------              8.0    7.35  >-----------<  291      [06-03-23 @ 08:21]              25.7     139  |  103  |  40  ----------------------------<  168      [06-03-23 @ 08:21]  3.8   |  27  |  2.2        Ca     8.1     [06-03-23 @ 08:21]      Mg     1.6     [06-03-23 @ 08:21]      Phos  3.4     [06-02-23 @ 07:16]    TPro  4.7  /  Alb  2.5  /  TBili  0.3  /  DBili  x   /  AST  52  /  ALT  18  /  AlkPhos  112  [06-03-23 @ 08:21]      Creatinine Trend:  SCr 2.2 [06-03 @ 08:21]  SCr 2.1 [06-02 @ 07:16]  SCr 2.2 [06-01 @ 17:42]  SCr 2.1 [06-01 @ 07:14]  SCr 2.0 [05-31 @ 06:57]    Urinalysis - [05-25-23 @ 21:46]      Color Light Yellow / Appearance Clear / SG 1.012 / pH 5.5      Gluc 500 mg/dL / Ketone Negative  / Bili Negative / Urobili <2 mg/dL       Blood Small / Protein Trace / Leuk Est Negative / Nitrite Negative      RBC 3 / WBC 1 / Hyaline 2 / Gran  / Sq Epi  / Non Sq Epi  / Bacteria Negative    Urine Creatinine 24      [05-30-23 @ 16:40]  Urine Protein 13      [05-30-23 @ 16:40]    Iron 100, TIBC 307, %sat 33      [05-26-23 @ 10:32]  Ferritin 19      [05-26-23 @ 10:32]  Lipid: chol 88, , HDL 37, LDL --      [05-27-23 @ 07:14]      Free Light Chains: kappa 16.35, lambda 15.47, ratio = 1.06      [05-30 @ 17:37]  Immunofixation Serum:   See Note      [05-30-23 @ 17:37]  SPEP Interpretation: See Note      [05-30-23 @ 17:37]  
24H events:    Patient is a 72y old Male who presents with a chief complaint of Extremity Swelling (01 Jun 2023 08:13)    Primary diagnosis of MAXX (acute kidney injury)       Today is hospital day 7d.      Patient seen and examined at bedside. Reports no active complaints.     PAST MEDICAL & SURGICAL HISTORY  Diabetes mellitus    Hyperlipidemia    Lymphoma    No significant past surgical history      SOCIAL HISTORY:  Negative for smoking/alcohol/drug use.     ALLERGIES:  No Known Allergies    MEDICATIONS:  STANDING MEDICATIONS  folic acid 1 milliGRAM(s) Oral daily  furosemide    Tablet 40 milliGRAM(s) Oral daily  magnesium sulfate  IVPB 2 Gram(s) IV Intermittent every 2 hours  NIFEdipine XL 60 milliGRAM(s) Oral daily  pantoprazole    Tablet 40 milliGRAM(s) Oral before breakfast  simvastatin 40 milliGRAM(s) Oral at bedtime    PRN MEDICATIONS  acetaminophen     Tablet .. 650 milliGRAM(s) Oral every 6 hours PRN  albuterol    90 MICROgram(s) HFA Inhaler 1 Puff(s) Inhalation every 8 hours PRN  aluminum hydroxide/magnesium hydroxide/simethicone Suspension 30 milliLiter(s) Oral every 4 hours PRN  melatonin 3 milliGRAM(s) Oral at bedtime PRN  ondansetron Injectable 4 milliGRAM(s) IV Push every 8 hours PRN    VITALS:   T(F): 97.5  HR: 89  BP: 130/57  RR: 18  SpO2: 98%    LABS:                        8.9    13.57 )-----------( 271      ( 01 Jun 2023 07:14 )             28.1     06-01    140  |  106  |  41<H>  ----------------------------<  129<H>  4.6   |  23  |  2.1<H>    Ca    8.1<L>      01 Jun 2023 07:14  Phos  3.6     05-30  Mg     1.7     06-01    TPro  4.8<L>  /  Alb  2.4<L>  /  TBili  0.3  /  DBili  x   /  AST  55<H>  /  ALT  16  /  AlkPhos  86  06-01                  RADIOLOGY:    PHYSICAL EXAM:  GENERAL: NAD  HEAD:  Normocephalic  EYES:  conjunctiva and sclera clear  ENMT: Moist mucous membranes  NERVOUS SYSTEM:  Alert, awake, Good concentration  CHEST/LUNG: decreased BS at bases b/l  HEART: Regular rate and rhythm  ABDOMEN: Soft, Nontender, Nondistended  EXTREMITIES: 2+ UE and 3+ LE edema  SKIN: warm, dry          
24H events:    Patient is a 72y old Male who presents with a chief complaint of Extremity Swelling (28 May 2023 13:33)    Primary diagnosis of MAXX (acute kidney injury)       Today is hospital day 4d.      Patient seen and examined at bedside. Reports no active complaints.     PAST MEDICAL & SURGICAL HISTORY  Diabetes mellitus    Hyperlipidemia    Lymphoma    No significant past surgical history      SOCIAL HISTORY:  Negative for smoking/alcohol/drug use.     ALLERGIES:  No Known Allergies    MEDICATIONS:  STANDING MEDICATIONS  folic acid 1 milliGRAM(s) Oral daily  iron sucrose IVPB 200 milliGRAM(s) IV Intermittent every 24 hours  isoniazid 300 milliGRAM(s) Oral daily  NIFEdipine XL 30 milliGRAM(s) Oral daily  pantoprazole    Tablet 40 milliGRAM(s) Oral before breakfast  simvastatin 40 milliGRAM(s) Oral at bedtime    PRN MEDICATIONS  acetaminophen     Tablet .. 650 milliGRAM(s) Oral every 6 hours PRN  albuterol    90 MICROgram(s) HFA Inhaler 1 Puff(s) Inhalation every 8 hours PRN  aluminum hydroxide/magnesium hydroxide/simethicone Suspension 30 milliLiter(s) Oral every 4 hours PRN  melatonin 3 milliGRAM(s) Oral at bedtime PRN  ondansetron Injectable 4 milliGRAM(s) IV Push every 8 hours PRN    VITALS:   T(F): 97.1  HR: 85  BP: 156/70  RR: 18  SpO2: 99%    LABS:                        8.6    6.83  )-----------( 242      ( 29 May 2023 06:33 )             27.6     05-29    141  |  111<H>  |  34<H>  ----------------------------<  144<H>  4.9   |  22  |  1.8<H>    Ca    7.9<L>      29 May 2023 06:33  Phos  3.9     05-29                CARDIAC MARKERS ( 28 May 2023 11:00 )  x     / 0.04 ng/mL / x     / x     / x          RADIOLOGY:    PHYSICAL EXAM:  GENERAL: NAD  HEAD:  Normocephalic  EYES:  conjunctiva and sclera clear  ENMT: Moist mucous membranes  NERVOUS SYSTEM:  Alert, awake, Good concentration  CHEST/LUNG: decreased BS at bases b/l  HEART: Regular rate and rhythm  ABDOMEN: Soft, Nontender, Nondistended  EXTREMITIES: 2+ UE and 3+ LE edema  SKIN: warm, dry    
24H events:    Patient is a 72y old Male who presents with a chief complaint of Extremity Swelling (29 May 2023 13:04)    Primary diagnosis of MAXX (acute kidney injury)       Today is hospital day 4d.      Patient seen and examined at bedside. Reports no active complaints.     PAST MEDICAL & SURGICAL HISTORY  Diabetes mellitus    Hyperlipidemia    Lymphoma    No significant past surgical history      SOCIAL HISTORY:  Negative for smoking/alcohol/drug use.     ALLERGIES:  No Known Allergies    MEDICATIONS:  STANDING MEDICATIONS  folic acid 1 milliGRAM(s) Oral daily  iron sucrose IVPB 200 milliGRAM(s) IV Intermittent every 24 hours  isoniazid 300 milliGRAM(s) Oral daily  NIFEdipine XL 30 milliGRAM(s) Oral daily  pantoprazole    Tablet 40 milliGRAM(s) Oral before breakfast  simvastatin 40 milliGRAM(s) Oral at bedtime    PRN MEDICATIONS  acetaminophen     Tablet .. 650 milliGRAM(s) Oral every 6 hours PRN  albuterol    90 MICROgram(s) HFA Inhaler 1 Puff(s) Inhalation every 8 hours PRN  aluminum hydroxide/magnesium hydroxide/simethicone Suspension 30 milliLiter(s) Oral every 4 hours PRN  melatonin 3 milliGRAM(s) Oral at bedtime PRN  ondansetron Injectable 4 milliGRAM(s) IV Push every 8 hours PRN    VITALS:   T(F): 97.1  HR: 85  BP: 156/70  RR: 18  SpO2: 99%    LABS:                        8.6    6.83  )-----------( 242      ( 29 May 2023 06:33 )             27.6     05-29    141  |  111<H>  |  34<H>  ----------------------------<  144<H>  4.9   |  22  |  1.8<H>    Ca    7.9<L>      29 May 2023 06:33  Phos  3.9     05-29                CARDIAC MARKERS ( 28 May 2023 11:00 )  x     / 0.04 ng/mL / x     / x     / x          RADIOLOGY:    PHYSICAL EXAM:  GENERAL: NAD  HEAD:  Normocephalic  EYES:  conjunctiva and sclera clear  ENMT: Moist mucous membranes  NERVOUS SYSTEM:  Alert, awake, Good concentration  CHEST/LUNG: decreased BS at bases b/l  HEART: Regular rate and rhythm  ABDOMEN: Soft, Nontender, Nondistended  EXTREMITIES: 2+ UE and 3+ LE edema  SKIN: warm, dry      
24H events:    Patient is a 72y old Male who presents with a chief complaint of Extremity Swelling (30 May 2023 11:33)    Primary diagnosis of MAXX (acute kidney injury)       Today is hospital day 5d.      Patient seen and examined at bedside. Reports no active complaints.     PAST MEDICAL & SURGICAL HISTORY  Diabetes mellitus    Hyperlipidemia    Lymphoma    No significant past surgical history      SOCIAL HISTORY:  Negative for smoking/alcohol/drug use.     ALLERGIES:  No Known Allergies    MEDICATIONS:  STANDING MEDICATIONS  folic acid 1 milliGRAM(s) Oral daily  furosemide   Injectable 60 milliGRAM(s) IV Push daily  iron sucrose IVPB 200 milliGRAM(s) IV Intermittent every 24 hours  magnesium sulfate  IVPB 2 Gram(s) IV Intermittent every 2 hours  NIFEdipine XL 30 milliGRAM(s) Oral daily  pantoprazole    Tablet 40 milliGRAM(s) Oral before breakfast  simvastatin 40 milliGRAM(s) Oral at bedtime    PRN MEDICATIONS  acetaminophen     Tablet .. 650 milliGRAM(s) Oral every 6 hours PRN  albuterol    90 MICROgram(s) HFA Inhaler 1 Puff(s) Inhalation every 8 hours PRN  aluminum hydroxide/magnesium hydroxide/simethicone Suspension 30 milliLiter(s) Oral every 4 hours PRN  melatonin 3 milliGRAM(s) Oral at bedtime PRN  ondansetron Injectable 4 milliGRAM(s) IV Push every 8 hours PRN    VITALS:   T(F): 96.9  HR: 86  BP: 129/61  RR: 18  SpO2: 99%    LABS:                        9.0    8.53  )-----------( 277      ( 30 May 2023 07:36 )             29.3     05-30    140  |  107  |  33<H>  ----------------------------<  118<H>  4.5   |  24  |  1.8<H>    Ca    7.9<L>      30 May 2023 07:36  Phos  3.9     05-29  Mg     1.5     05-30    TPro  4.7<L>  /  Alb  2.4<L>  /  TBili  0.2  /  DBili  x   /  AST  52<H>  /  ALT  16  /  AlkPhos  64  05-30                  RADIOLOGY:    PHYSICAL EXAM:  GENERAL: NAD  HEAD:  Normocephalic  EYES:  conjunctiva and sclera clear  ENMT: Moist mucous membranes  NERVOUS SYSTEM:  Alert, awake, Good concentration  CHEST/LUNG: decreased BS at bases b/l  HEART: Regular rate and rhythm  ABDOMEN: Soft, Nontender, Nondistended  EXTREMITIES: 2+ UE and 3+ LE edema  SKIN: warm, dry      
24H events:    Patient is a 72y old Male who presents with a chief complaint of Extremity Swelling (30 May 2023 16:10)    Primary diagnosis of MAXX (acute kidney injury)       Today is hospital day 6d.      Patient seen and examined at bedside. Reports no active complaints.     PAST MEDICAL & SURGICAL HISTORY  Diabetes mellitus    Hyperlipidemia    Lymphoma    No significant past surgical history      SOCIAL HISTORY:  Negative for smoking/alcohol/drug use.     ALLERGIES:  No Known Allergies    MEDICATIONS:  STANDING MEDICATIONS  folic acid 1 milliGRAM(s) Oral daily  furosemide   Injectable 60 milliGRAM(s) IV Push daily  pantoprazole    Tablet 40 milliGRAM(s) Oral before breakfast  simvastatin 40 milliGRAM(s) Oral at bedtime    PRN MEDICATIONS  acetaminophen     Tablet .. 650 milliGRAM(s) Oral every 6 hours PRN  albuterol    90 MICROgram(s) HFA Inhaler 1 Puff(s) Inhalation every 8 hours PRN  aluminum hydroxide/magnesium hydroxide/simethicone Suspension 30 milliLiter(s) Oral every 4 hours PRN  melatonin 3 milliGRAM(s) Oral at bedtime PRN  ondansetron Injectable 4 milliGRAM(s) IV Push every 8 hours PRN    VITALS:   T(F): 97  HR: 85  BP: 171/75  RR: 18  SpO2: 98%    LABS:                        8.4    7.98  )-----------( 250      ( 31 May 2023 06:57 )             27.0     05-31    140  |  108  |  34<H>  ----------------------------<  103<H>  4.7   |  26  |  2.0<H>    Ca    8.2<L>      31 May 2023 06:57  Phos  3.6     05-30  Mg     1.8     05-31    TPro  4.4<L>  /  Alb  2.5<L>  /  TBili  0.2  /  DBili  x   /  AST  46<H>  /  ALT  14  /  AlkPhos  61  05-31                  RADIOLOGY:    PHYSICAL EXAM:  GENERAL: NAD  HEAD:  Normocephalic  EYES:  conjunctiva and sclera clear  ENMT: Moist mucous membranes  NERVOUS SYSTEM:  Alert, awake, Good concentration  CHEST/LUNG: decreased BS at bases b/l  HEART: Regular rate and rhythm  ABDOMEN: Soft, Nontender, Nondistended  EXTREMITIES: 2+ UE and 3+ LE edema  SKIN: warm, dry      
Nephrology Progress Note    ROSS DIETRICH  MRN-[id number]  72y  Male    S:  Patient is seen and examined, events over the last 24h noted.    O:  Allergies:  No Known Allergies    Hospital Medications:   MEDICATIONS  (STANDING):  folic acid 1 milliGRAM(s) Oral daily  pantoprazole    Tablet 40 milliGRAM(s) Oral before breakfast  simvastatin 40 milliGRAM(s) Oral at bedtime    MEDICATIONS  (PRN):  acetaminophen     Tablet .. 650 milliGRAM(s) Oral every 6 hours PRN Temp greater or equal to 38C (100.4F), Mild Pain (1 - 3)  albuterol    90 MICROgram(s) HFA Inhaler 1 Puff(s) Inhalation every 8 hours PRN Shortness of Breath and/or Wheezing  aluminum hydroxide/magnesium hydroxide/simethicone Suspension 30 milliLiter(s) Oral every 4 hours PRN Dyspepsia  melatonin 3 milliGRAM(s) Oral at bedtime PRN Insomnia  ondansetron Injectable 4 milliGRAM(s) IV Push every 8 hours PRN Nausea and/or Vomiting    Home Medications:  Albuterol (Eqv-ProAir HFA) 90 mcg/inh inhalation aerosol: 1 puff(s) inhaled every 6 hours (26 May 2023 12:53)  aspirin 81 mg oral tablet: 1 tab(s) orally once a day (26 May 2023 12:59)  famotidine 40 mg oral tablet: 1 tab(s) orally once a day (at bedtime) (26 May 2023 12:59)  ferrous sulfate 325 mg (65 mg elemental iron) oral tablet: BID (26 May 2023 12:59)  folic acid 1 mg oral tablet: 1 tab(s) orally once a day (26 May 2023 12:59)  isoniazid 300 mg oral tablet: 1 tab(s) orally once a day (26 May 2023 12:58)  Lasix 20 mg oral tablet: 1 tab(s) orally once a day (26 May 2023 12:59)  losartan 25 mg oral tablet: 1 tab(s) orally once a day (26 May 2023 12:59)  pioglitazone 30 mg oral tablet: 1 tab(s) orally once a day (26 May 2023 12:59)  Synjardy 12.5 mg-1000 mg oral tablet: 1 tab(s) orally 2 times a day (26 May 2023 12:59)  Trulicity Pen 1.5 mg/0.5 mL subcutaneous solution:  (26 May 2023 12:59)      VITALS:  T(F): 97 (23 @ 08:00), Max: 97 (23 @ 15:30)  HR: 85 (23 @ 08:00)  BP: 171/75 (23 @ 08:00)  RR: 18 (23 @ 08:00)  SpO2: 98% (23 @ 08:00)  Wt(kg): --  I&O's Detail    30 May 2023 07:  -  31 May 2023 07:00  --------------------------------------------------------  IN:  Total IN: 0 mL    OUT:    Voided (mL): 900 mL  Total OUT: 900 mL    Total NET: -900 mL        I&O's Summary    30 May 2023 07:01  -  31 May 2023 07:00  --------------------------------------------------------  IN: 0 mL / OUT: 900 mL / NET: -900 mL          PHYSICAL EXAM:  Gen: NAD  Resp: b/l breath sounds  Card: S1/S2  Abd: soft  Extremities: edema      LABS:        140  |  108  |  34<H>  ----------------------------<  103<H>  4.7   |  26  |  2.0<H>    Ca    8.2<L>      31 May 2023 06:57  Phos  3.6       Mg     1.8         TPro  4.4<L>  /  Alb  2.5<L>  /  TBili  0.2  /  DBili      /  AST  46<H>  /  ALT  14  /  AlkPhos  61      Phosphorus Level, Serum: 3.6 mg/dL (23 @ 17:37)  Phosphorus Level, Serum: 3.9 mg/dL (23 @ 06:33)                            8.4    7.98  )-----------( 250      ( 31 May 2023 06:57 )             27.0     Mean Cell Volume: 81.3 fL (23 @ 06:57)    Iron Total, Serum: 100 ug/dL (23 @ 10:32)  % Saturation, Iron: 33 % (23 @ 10:32)      Urine Studies:  Urinalysis Basic - ( 25 May 2023 21:46 )    Color: Light Yellow / Appearance: Clear / S.012 / pH:   Gluc:  / Ketone: Negative  / Bili: Negative / Urobili: <2 mg/dL   Blood:  / Protein: Trace / Nitrite: Negative   Leuk Esterase: Negative / RBC: 3 /HPF / WBC 1 /HPF   Sq Epi:  / Non Sq Epi:  / Bacteria: Negative    Protein/Creatinine Ratio Calculation: 0.5 Ratio ( @ 16:40)    Creatinine trend:  Creatinine, Serum: 2.0 mg/dL (23 @ 06:57)  Creatinine, Serum: 1.8 mg/dL (23 @ 07:36)  Creatinine, Serum: 1.8 mg/dL (23 @ 06:33)  Creatinine, Serum: 1.7 mg/dL (23 @ 11:00)  Creatinine, Serum: 1.9 mg/dL (23 @ 07:14)  Creatinine, Serum: 2.0 mg/dL (23 @ 08:00)      LAMAR Kappa: 16.35 mg/dL (23 @ 17:37)  LAMAR Lambda: 15.47 mg/dL (23 @ 17:37)  [place]/Lambda Free Light Chain Ratio, Serum: 1.06 Ratio (23 @ 17:37)      US Kidney and Bladder:   ACC: [id number] EXAM:  US KIDNEYS AND BLADDER   ORDERED BY: OSMAN WHITE     PROCEDURE DATE:  2023          INTERPRETATION:  CLINICAL HISTORY / REASON FOR EXAM: Acute kidney injury    CORRELATION: CT abdomen and pelvis from 2022    PROCEDURE: Retroperitoneal ultrasound was performed.    FINDINGS:    RIGHT KIDNEY: 10.5 cm. No hydronephrosis or calculi. Upper pole   fat-containing lesion measuring 1.6 x 1.6 x 1.7 cm, stable.    LEFT KIDNEY: 12.1 cm. No renal mass, hydronephrosis or calculi.    URINARY BLADDER: No debris or calculus. Bilateral ureteral jets are   visualized. Prevoid volume of approximately 296 mL. Postvoid volume not   performed due to patient preference.      IMPRESSION:    No sonographic evidence of hydronephrosis.    --- End of Report ---            ADAMS NEWTON MD; Attending Radiologist  This document has been electronically signed. May 27 2023 10:27PM (23 @ 16:26)    
Patient is a 72y old  Male who presents with a chief complaint of Extremity Swelling (01 Jun 2023 13:08)      Patient seen and examined at bedside.  patient denies any chest pain or shortness of breath   ALLERGIES:  No Known Allergies    MEDICATIONS:  acetaminophen     Tablet .. 650 milliGRAM(s) Oral every 6 hours PRN  albuterol    90 MICROgram(s) HFA Inhaler 1 Puff(s) Inhalation every 8 hours PRN  aluminum hydroxide/magnesium hydroxide/simethicone Suspension 30 milliLiter(s) Oral every 4 hours PRN  folic acid 1 milliGRAM(s) Oral daily  furosemide    Tablet 40 milliGRAM(s) Oral daily  magnesium sulfate  IVPB 2 Gram(s) IV Intermittent every 2 hours  melatonin 3 milliGRAM(s) Oral at bedtime PRN  NIFEdipine XL 60 milliGRAM(s) Oral daily  ondansetron Injectable 4 milliGRAM(s) IV Push every 8 hours PRN  pantoprazole    Tablet 40 milliGRAM(s) Oral before breakfast  simvastatin 40 milliGRAM(s) Oral at bedtime    Vital Signs Last 24 Hrs  T(F): 97 (01 Jun 2023 15:05), Max: 97.5 (01 Jun 2023 08:00)  HR: 92 (01 Jun 2023 15:05) (89 - 98)  BP: 140/65 (01 Jun 2023 15:05) (126/58 - 140/65)  RR: 18 (01 Jun 2023 15:05) (18 - 18)  SpO2: 98% (01 Jun 2023 15:05) (98% - 98%)  I&O's Summary    01 Jun 2023 07:01  -  01 Jun 2023 17:56  --------------------------------------------------------  IN: 840 mL / OUT: 1150 mL / NET: -310 mL        PHYSICAL EXAM:  General: NAD, A/O x 3  ENT: MMM  Neck: Supple, No JVD  Lungs: Clear to auscultation bilaterally  Cardio: RRR, S1/S2, No murmurs  Abdomen: Soft, Nontender, Nondistended; Bowel sounds present  Extremities: No cyanosis, +2-+3 edema all limbs     LABS:                        8.9    13.57 )-----------( 271      ( 01 Jun 2023 07:14 )             28.1     06-01    140  |  106  |  41  ----------------------------<  129  4.6   |  23  |  2.1    Ca    8.1      01 Jun 2023 07:14  Phos  3.6     05-30  Mg     1.7     06-01    TPro  4.8  /  Alb  2.4  /  TBili  0.3  /  DBili  x   /  AST  55  /  ALT  16  /  AlkPhos  86  06-01              05-27 Chol 88 mg/dL LDL -- HDL 37 mg/dL Trig 128 mg/dL              POCT Blood Glucose.: 160 mg/dL (01 Jun 2023 16:39)              RADIOLOGY & ADDITIONAL TESTS:    Care Discussed with Consultants/Other Providers: 
Patient is a 72y old  Male who presents with a chief complaint of Extremity Swelling (03 Jun 2023 12:51)      Patient seen and examined at bedside.  Patient denies any chest pain or shortness of breath   ALLERGIES:  No Known Allergies    MEDICATIONS:  acetaminophen     Tablet .. 650 milliGRAM(s) Oral every 6 hours PRN  albuterol    90 MICROgram(s) HFA Inhaler 1 Puff(s) Inhalation every 8 hours PRN  aluminum hydroxide/magnesium hydroxide/simethicone Suspension 30 milliLiter(s) Oral every 4 hours PRN  folic acid 1 milliGRAM(s) Oral daily  furosemide   Injectable 40 milliGRAM(s) IV Push daily  magnesium sulfate  IVPB 2 Gram(s) IV Intermittent every 2 hours  melatonin 3 milliGRAM(s) Oral at bedtime PRN  NIFEdipine XL 60 milliGRAM(s) Oral daily  ondansetron Injectable 4 milliGRAM(s) IV Push every 8 hours PRN  pantoprazole    Tablet 40 milliGRAM(s) Oral before breakfast  simvastatin 40 milliGRAM(s) Oral at bedtime    Vital Signs Last 24 Hrs  T(F): 97.9 (03 Jun 2023 07:40), Max: 99.5 (02 Jun 2023 16:00)  HR: 84 (03 Jun 2023 07:40) (84 - 90)  BP: 157/70 (03 Jun 2023 07:40) (140/65 - 161/70)  RR: 18 (03 Jun 2023 07:40) (18 - 18)  SpO2: --  I&O's Summary    02 Jun 2023 07:01  -  03 Jun 2023 07:00  --------------------------------------------------------  IN: 1100 mL / OUT: 1100 mL / NET: 0 mL        PHYSICAL EXAM:  General: NAD, A/O x 3  ENT: MMM  Neck: Supple, No JVD  Lungs: Clear to auscultation bilaterally  Cardio: RRR, S1/S2, No murmurs  Abdomen: Soft, Nontender, Nondistended; Bowel sounds present  Extremities: No cyanosis, UE improving edema +2-+3; LE +4  LABS:                        8.0    7.35  )-----------( 291      ( 03 Jun 2023 08:21 )             25.7     06-03    139  |  103  |  40  ----------------------------<  168  3.8   |  27  |  2.2    Ca    8.1      03 Jun 2023 08:21  Phos  3.4     06-02  Mg     1.6     06-03    TPro  4.7  /  Alb  2.5  /  TBili  0.3  /  DBili  x   /  AST  52  /  ALT  18  /  AlkPhos  112  06-03              05-27 Chol 88 mg/dL LDL -- HDL 37 mg/dL Trig 128 mg/dL                          RADIOLOGY & ADDITIONAL TESTS:    Care Discussed with Consultants/Other Providers: 
Patient is a 72y old  Male who presents with a chief complaint of Extremity Swelling (04 Jun 2023 11:24)      Patient seen and examined at bedside.  Patient denies any chest pain or shortness of breath   ALLERGIES:  No Known Allergies    MEDICATIONS:  acetaminophen     Tablet .. 650 milliGRAM(s) Oral every 6 hours PRN  albuterol    90 MICROgram(s) HFA Inhaler 1 Puff(s) Inhalation every 8 hours PRN  aluminum hydroxide/magnesium hydroxide/simethicone Suspension 30 milliLiter(s) Oral every 4 hours PRN  folic acid 1 milliGRAM(s) Oral daily  furosemide   Injectable 40 milliGRAM(s) IV Push daily  magnesium sulfate  IVPB 2 Gram(s) IV Intermittent once  melatonin 3 milliGRAM(s) Oral at bedtime PRN  NIFEdipine XL 60 milliGRAM(s) Oral daily  ondansetron Injectable 4 milliGRAM(s) IV Push every 8 hours PRN  pantoprazole    Tablet 40 milliGRAM(s) Oral before breakfast  simvastatin 40 milliGRAM(s) Oral at bedtime    Vital Signs Last 24 Hrs  T(F): 97.8 (04 Jun 2023 10:19), Max: 98.7 (04 Jun 2023 05:00)  HR: 85 (04 Jun 2023 10:19) (85 - 95)  BP: 160/72 (04 Jun 2023 10:19) (139/65 - 163/70)  RR: 18 (04 Jun 2023 10:19) (18 - 18)  SpO2: 98% (04 Jun 2023 05:00) (98% - 100%)  I&O's Summary    04 Jun 2023 07:01  -  04 Jun 2023 16:43  --------------------------------------------------------  IN: 240 mL / OUT: 0 mL / NET: 240 mL        PHYSICAL EXAM:  General: NAD, A/O x 3  ENT: MMM  Neck: Supple, No JVD  Lungs: Clear to auscultation bilaterally  Cardio: RRR, S1/S2, No murmurs  Abdomen: Soft, Nontender, Nondistended; Bowel sounds present  Extremities: No cyanosis, +3-+4 LE, UE decreasing significantly     LABS:                        8.0    7.35  )-----------( 291      ( 03 Jun 2023 08:21 )             25.7     06-03    139  |  103  |  40  ----------------------------<  168  3.8   |  27  |  2.2    Ca    8.1      03 Jun 2023 08:21  Phos  3.4     06-02  Mg     1.6     06-03    TPro  4.7  /  Alb  2.5  /  TBili  0.3  /  DBili  x   /  AST  52  /  ALT  18  /  AlkPhos  112  06-03              05-27 Chol 88 mg/dL LDL -- HDL 37 mg/dL Trig 128 mg/dL                          RADIOLOGY & ADDITIONAL TESTS:    Care Discussed with Consultants/Other Providers: 
Patient is a 72y old  Male who presents with a chief complaint of Extremity Swelling (05 Jun 2023 15:18)      Patient seen and examined at bedside.  Patient denies any chest pain or shortness of breath   ALLERGIES:  No Known Allergies    MEDICATIONS:  acetaminophen     Tablet .. 650 milliGRAM(s) Oral every 6 hours PRN  albuterol    90 MICROgram(s) HFA Inhaler 1 Puff(s) Inhalation every 8 hours PRN  aluminum hydroxide/magnesium hydroxide/simethicone Suspension 30 milliLiter(s) Oral every 4 hours PRN  folic acid 1 milliGRAM(s) Oral daily  melatonin 3 milliGRAM(s) Oral at bedtime PRN  NIFEdipine XL 60 milliGRAM(s) Oral daily  ondansetron Injectable 4 milliGRAM(s) IV Push every 8 hours PRN  pantoprazole    Tablet 40 milliGRAM(s) Oral before breakfast  simvastatin 40 milliGRAM(s) Oral at bedtime    Vital Signs Last 24 Hrs  T(F): 97.6 (05 Jun 2023 15:32), Max: 97.6 (05 Jun 2023 00:23)  HR: 60 (05 Jun 2023 15:32) (60 - 90)  BP: 126/60 (05 Jun 2023 15:32) (105/53 - 154/69)  RR: 18 (05 Jun 2023 15:32) (18 - 18)  SpO2: 97% (05 Jun 2023 08:18) (97% - 97%)  I&O's Summary    04 Jun 2023 07:01  -  05 Jun 2023 07:00  --------------------------------------------------------  IN: 850 mL / OUT: 700 mL / NET: 150 mL    05 Jun 2023 07:01  -  05 Jun 2023 16:28  --------------------------------------------------------  IN: 720 mL / OUT: 0 mL / NET: 720 mL        PHYSICAL EXAM:  General: NAD, A/O x 3  ENT: MMM  Neck: Supple, No JVD  Lungs: Clear to auscultation bilaterally  Cardio: RRR, S1/S2, No murmurs  Abdomen: Soft, Nontender, Nondistended; Bowel sounds present  Extremities: No cyanosis, +3-4 LE edema, +2 UE edema     LABS:                        7.6    6.26  )-----------( 315      ( 05 Jun 2023 07:09 )             23.9     06-05    143  |  107  |  39  ----------------------------<  114  3.9   |  28  |  2.3    Ca    8.1      05 Jun 2023 07:09  Mg     1.8     06-05    TPro  4.4  /  Alb  2.3  /  TBili  0.2  /  DBili  x   /  AST  72  /  ALT  25  /  AlkPhos  182  06-05              05-27 Chol 88 mg/dL LDL -- HDL 37 mg/dL Trig 128 mg/dL                          RADIOLOGY & ADDITIONAL TESTS:    Care Discussed with Consultants/Other Providers: 
Patient is a 72y old  Male who presents with a chief complaint of Extremity Swelling (06 Jun 2023 14:24)      Patient seen and examined at bedside.  Patient denies any chest pain or shortness of breath   ALLERGIES:  No Known Allergies    MEDICATIONS:  acetaminophen     Tablet .. 650 milliGRAM(s) Oral every 6 hours PRN  albuterol    90 MICROgram(s) HFA Inhaler 1 Puff(s) Inhalation every 8 hours PRN  aluminum hydroxide/magnesium hydroxide/simethicone Suspension 30 milliLiter(s) Oral every 4 hours PRN  folic acid 1 milliGRAM(s) Oral daily  furosemide    Tablet 40 milliGRAM(s) Oral daily  magnesium sulfate  IVPB 2 Gram(s) IV Intermittent every 2 hours  melatonin 3 milliGRAM(s) Oral at bedtime PRN  NIFEdipine XL 60 milliGRAM(s) Oral daily  ondansetron Injectable 4 milliGRAM(s) IV Push every 8 hours PRN  pantoprazole    Tablet 40 milliGRAM(s) Oral before breakfast  simvastatin 40 milliGRAM(s) Oral at bedtime    Vital Signs Last 24 Hrs  T(F): 98.3 (06 Jun 2023 15:57), Max: 98.7 (05 Jun 2023 23:09)  HR: 86 (06 Jun 2023 15:57) (86 - 88)  BP: 164/67 (06 Jun 2023 15:57) (115/53 - 164/67)  RR: 18 (06 Jun 2023 15:57) (17 - 18)  SpO2: --  I&O's Summary    05 Jun 2023 07:01  -  06 Jun 2023 07:00  --------------------------------------------------------  IN: 720 mL / OUT: 1000 mL / NET: -280 mL        PHYSICAL EXAM:  General: NAD, A/O x 3  ENT: MMM  Neck: Supple, No JVD  Lungs: Clear to auscultation bilaterally  Cardio: RRR, S1/S2, 2/6 murmur   Abdomen: Soft, Nontender, Nondistended; Bowel sounds present  Extremities: No cyanosis, +1 UE edema, LE +3 edema     LABS:                        7.2    6.10  )-----------( 320      ( 06 Jun 2023 06:11 )             23.2     06-06    142  |  104  |  34  ----------------------------<  102  3.6   |  28  |  1.8    Ca    8.2      06 Jun 2023 06:11  Mg     1.6     06-06    TPro  4.4  /  Alb  2.3  /  TBili  <0.2  /  DBili  x   /  AST  56  /  ALT  24  /  AlkPhos  161  06-06              05-27 Chol 88 mg/dL LDL -- HDL 37 mg/dL Trig 128 mg/dL                          RADIOLOGY & ADDITIONAL TESTS:    Care Discussed with Consultants/Other Providers: 
ROSS DIETRICH 72y Male  MRN#: [id number]   Hospital Day: 8d    SUBJECTIVE  Patient is a 72y old Male who presents with a chief complaint of Extremity Swelling (02 Jun 2023 11:02)  Currently admitted to medicine with the primary diagnosis of MAXX (acute kidney injury)      INTERVAL HPI AND OVERNIGHT EVENTS:  Patient was examined and seen at bedside. This morning he is resting comfortably in bed. No issues or overnight events.    OBJECTIVE  PAST MEDICAL & SURGICAL HISTORY  Diabetes mellitus    Hyperlipidemia    Lymphoma    No significant past surgical history      ALLERGIES:  No Known Allergies    MEDICATIONS:  STANDING MEDICATIONS  folic acid 1 milliGRAM(s) Oral daily  magnesium sulfate  IVPB 2 Gram(s) IV Intermittent every 2 hours  NIFEdipine XL 60 milliGRAM(s) Oral daily  pantoprazole    Tablet 40 milliGRAM(s) Oral before breakfast  simvastatin 40 milliGRAM(s) Oral at bedtime    PRN MEDICATIONS  acetaminophen     Tablet .. 650 milliGRAM(s) Oral every 6 hours PRN  albuterol    90 MICROgram(s) HFA Inhaler 1 Puff(s) Inhalation every 8 hours PRN  aluminum hydroxide/magnesium hydroxide/simethicone Suspension 30 milliLiter(s) Oral every 4 hours PRN  melatonin 3 milliGRAM(s) Oral at bedtime PRN  ondansetron Injectable 4 milliGRAM(s) IV Push every 8 hours PRN      VITAL SIGNS: Last 24 Hours  T(C): 36.6 (02 Jun 2023 07:10), Max: 36.7 (01 Jun 2023 23:36)  T(F): 97.8 (02 Jun 2023 07:10), Max: 98 (01 Jun 2023 23:36)  HR: 92 (02 Jun 2023 07:10) (82 - 92)  BP: 140/62 (02 Jun 2023 07:10) (140/62 - 143/63)  BP(mean): --  RR: 18 (02 Jun 2023 07:10) (18 - 18)  SpO2: --    LABS:                        8.2    9.80  )-----------( 289      ( 02 Jun 2023 07:16 )             26.2     06-02    143  |  107  |  41<H>  ----------------------------<  114<H>  4.5   |  25  |  2.1<H>    Ca    8.3<L>      02 Jun 2023 07:16  Phos  3.4     06-02  Mg     1.8     06-02    TPro  4.9<L>  /  Alb  2.7<L>  /  TBili  0.3  /  DBili  x   /  AST  54<H>  /  ALT  16  /  AlkPhos  100  06-02                
SUBJECTIVE:    Patient is a 72y old Male who presents with a chief complaint of Extremity Swelling (05 Jun 2023 16:28)    Currently admitted to medicine with the primary diagnosis of:    Today is hospital day 12d.     Overnight Events:     still working on [employer] report     PAST MEDICAL & SURGICAL HISTORY  Diabetes mellitus    Hyperlipidemia    Lymphoma    No significant past surgical history        ALLERGIES:  No Known Allergies    MEDICATIONS:  STANDING MEDICATIONS  folic acid 1 milliGRAM(s) Oral daily  furosemide    Tablet 40 milliGRAM(s) Oral daily  magnesium sulfate  IVPB 2 Gram(s) IV Intermittent every 2 hours  NIFEdipine XL 60 milliGRAM(s) Oral daily  pantoprazole    Tablet 40 milliGRAM(s) Oral before breakfast  simvastatin 40 milliGRAM(s) Oral at bedtime    PRN MEDICATIONS  acetaminophen     Tablet .. 650 milliGRAM(s) Oral every 6 hours PRN  albuterol    90 MICROgram(s) HFA Inhaler 1 Puff(s) Inhalation every 8 hours PRN  aluminum hydroxide/magnesium hydroxide/simethicone Suspension 30 milliLiter(s) Oral every 4 hours PRN  melatonin 3 milliGRAM(s) Oral at bedtime PRN  ondansetron Injectable 4 milliGRAM(s) IV Push every 8 hours PRN    VITALS:   ICU Vital Signs Last 24 Hrs  T(C): 36.6 (06 Jun 2023 08:05), Max: 37.1 (05 Jun 2023 23:09)  T(F): 97.8 (06 Jun 2023 08:05), Max: 98.7 (05 Jun 2023 23:09)  HR: 87 (06 Jun 2023 08:05) (60 - 88)  BP: 115/53 (06 Jun 2023 08:05) (115/53 - 160/70)  BP(mean): --  ABP: --  ABP(mean): --  RR: 17 (06 Jun 2023 08:05) (17 - 18)  SpO2: --        LABS:                        7.2    6.10  )-----------( 320      ( 06 Jun 2023 06:11 )             23.2     06-06    142  |  104  |  34<H>  ----------------------------<  102<H>  3.6   |  28  |  1.8<H>    Ca    8.2<L>      06 Jun 2023 06:11  Mg     1.6     06-06    TPro  4.4<L>  /  Alb  2.3<L>  /  TBili  <0.2  /  DBili  x   /  AST  56<H>  /  ALT  24  /  AlkPhos  161<H>  06-06                    RADIOLOGY:  < from: Xray Chest 1 View- PORTABLE-Urgent (Xray Chest 1 View- PORTABLE-Urgent .) (06.03.23 @ 09:13) >  No radiographic evidence of acute cardiopulmonary disease.    < end of copied text >    PHYSICAL EXAM:  GEN: mobile   LUNGS: clear   HEART: s1 s2  ABD: nontender  EXT: edema lower extremity    NEURO: ao3
seen and examined  24 h events noted       PAST HISTORY  --------------------------------------------------------------------------------  No significant changes to PMH, PSH, FHx, SHx, unless otherwise noted    ALLERGIES & MEDICATIONS  --------------------------------------------------------------------------------  Allergies    No Known Allergies    Intolerances      Standing Inpatient Medications  folic acid 1 milliGRAM(s) Oral daily  furosemide    Tablet 40 milliGRAM(s) Oral daily  NIFEdipine XL 60 milliGRAM(s) Oral daily  pantoprazole    Tablet 40 milliGRAM(s) Oral before breakfast  simvastatin 40 milliGRAM(s) Oral at bedtime    PRN Inpatient Medications  acetaminophen     Tablet .. 650 milliGRAM(s) Oral every 6 hours PRN  albuterol    90 MICROgram(s) HFA Inhaler 1 Puff(s) Inhalation every 8 hours PRN  aluminum hydroxide/magnesium hydroxide/simethicone Suspension 30 milliLiter(s) Oral every 4 hours PRN  melatonin 3 milliGRAM(s) Oral at bedtime PRN  ondansetron Injectable 4 milliGRAM(s) IV Push every 8 hours PRN        VITALS/PHYSICAL EXAM  --------------------------------------------------------------------------------  T(C): 36.2 (05-31-23 @ 23:17), Max: 36.7 (05-31-23 @ 15:41)  HR: 98 (05-31-23 @ 23:17) (88 - 98)  BP: 126/58 (05-31-23 @ 23:17) (126/58 - 128/60)  RR: 18 (05-31-23 @ 23:17) (18 - 18)  SpO2: --  Wt(kg): --        Physical Exam:  	Gen: NAD,   	Pulm: CTA B/L  	CV:  S1S2; no rub  	Abd:  soft, nontender/nondistended  	LE:  edema      LABS/STUDIES  --------------------------------------------------------------------------------              8.9    13.57 >-----------<  271      [06-01-23 @ 07:14]              28.1     140  |  108  |  34  ----------------------------<  103      [05-31-23 @ 06:57]  4.7   |  26  |  2.0        Ca     8.2     [05-31-23 @ 06:57]      Mg     1.8     [05-31-23 @ 06:57]      Phos  3.6     [05-30-23 @ 17:37]    TPro  4.4  /  Alb  2.5  /  TBili  0.2  /  DBili  x   /  AST  46  /  ALT  14  /  AlkPhos  61  [05-31-23 @ 06:57]    Uric acid 8.0      [05-30-23 @ 17:37]        [05-30-23 @ 17:37]    Creatinine Trend:  SCr 2.0 [05-31 @ 06:57]  SCr 1.8 [05-30 @ 07:36]  SCr 1.8 [05-29 @ 06:33]  SCr 1.7 [05-28 @ 11:00]  SCr 1.9 [05-27 @ 07:14]    Urinalysis - [05-25-23 @ 21:46]      Color Light Yellow / Appearance Clear / SG 1.012 / pH 5.5      Gluc 500 mg/dL / Ketone Negative  / Bili Negative / Urobili <2 mg/dL       Blood Small / Protein Trace / Leuk Est Negative / Nitrite Negative      RBC 3 / WBC 1 / Hyaline 2 / Gran  / Sq Epi  / Non Sq Epi  / Bacteria Negative    Urine Creatinine 24      [05-30-23 @ 16:40]  Urine Protein 13      [05-30-23 @ 16:40]  Urine Sodium 74.0      [05-26-23 @ 16:56]  Urine Potassium 22      [05-26-23 @ 16:56]  Urine Chloride 71      [05-26-23 @ 16:56]    Iron 100, TIBC 307, %sat 33      [05-26-23 @ 10:32]  Ferritin 19      [05-26-23 @ 10:32]  Lipid: chol 88, , HDL 37, LDL --      [05-27-23 @ 07:14]      Free Light Chains: kappa 16.35, lambda 15.47, ratio = 1.06      [05-30 @ 17:37]  
 [facility] FOLLOW UP NOTE  --------------------------------------------------------------------------------  Chief Complaint:    24 hour events/subjective:        PAST HISTORY  --------------------------------------------------------------------------------  No significant changes to PMH, PSH, FHx, SHx, unless otherwise noted    ALLERGIES & MEDICATIONS  --------------------------------------------------------------------------------  Allergies    No Known Allergies    Intolerances      Standing Inpatient Medications  folic acid 1 milliGRAM(s) Oral daily  furosemide   Injectable 40 milliGRAM(s) IV Push daily  magnesium sulfate  IVPB 2 Gram(s) IV Intermittent every 2 hours  NIFEdipine XL 60 milliGRAM(s) Oral daily  pantoprazole    Tablet 40 milliGRAM(s) Oral before breakfast  simvastatin 40 milliGRAM(s) Oral at bedtime    PRN Inpatient Medications  acetaminophen     Tablet .. 650 milliGRAM(s) Oral every 6 hours PRN  albuterol    90 MICROgram(s) HFA Inhaler 1 Puff(s) Inhalation every 8 hours PRN  aluminum hydroxide/magnesium hydroxide/simethicone Suspension 30 milliLiter(s) Oral every 4 hours PRN  melatonin 3 milliGRAM(s) Oral at bedtime PRN  ondansetron Injectable 4 milliGRAM(s) IV Push every 8 hours PRN      REVIEW OF SYSTEMS  --------------------------------------------------------------------------------  Gen: No weight changes, fatigue, fevers/chills, weakness  Skin: No rashes  Head/Eyes/Ears/Mouth: No headache; Normal hearing; Normal vision w/o blurriness; No sinus pain/discomfort, sore throat  Respiratory: No dyspnea, cough, wheezing, hemoptysis  CV: No chest pain, PND, orthopnea  GI: No abdominal pain, diarrhea, constipation, nausea, vomiting, melena, hematochezia  : No increased frequency, dysuria, hematuria, nocturia  MSK: No joint pain/swelling; no back pain; no edema  Neuro: No dizziness/lightheadedness, weakness, seizures, numbness, tingling  Heme: No easy bruising or bleeding  Endo: No heat/cold intolerance  Psych: No significant nervousness, anxiety, stress, depression    All other systems were reviewed and are negative, except as noted.    VITALS/PHYSICAL EXAM  --------------------------------------------------------------------------------  T(C): 36.6 (06-04-23 @ 10:19), Max: 37.1 (06-04-23 @ 05:00)  HR: 85 (06-04-23 @ 10:19) (85 - 95)  BP: 160/72 (06-04-23 @ 10:19) (136/62 - 163/70)  RR: 18 (06-04-23 @ 10:19) (18 - 18)  SpO2: 98% (06-04-23 @ 05:00) (98% - 100%)  Wt(kg): --        Physical Exam:  	Gen: NAD, well-appearing  	HEENT: PERRL, supple neck, clear oropharynx  	Pulm: CTA B/L  	CV: RRR, S1S2; no rub  	Back: No spinal or CVA tenderness; no sacral edema  	Abd: +BS, soft, nontender/nondistended  	: No suprapubic tenderness  	UE: Warm, FROM, no clubbing, intact strength; no edema; no asterixis  	LE: Warm, FROM, no clubbing, intact strength; no edema  	Neuro: No focal deficits, intact gait  	Psych: Normal affect and mood  	Skin: Warm, without rashes  	Vascular access:    LABS/STUDIES  --------------------------------------------------------------------------------              8.0    7.35  >-----------<  291      [06-03-23 @ 08:21]              25.7     139  |  103  |  40  ----------------------------<  168      [06-03-23 @ 08:21]  3.8   |  27  |  2.2        Ca     8.1     [06-03-23 @ 08:21]      Mg     1.6     [06-03-23 @ 08:21]    TPro  4.7  /  Alb  2.5  /  TBili  0.3  /  DBili  x   /  AST  52  /  ALT  18  /  AlkPhos  112  [06-03-23 @ 08:21]          Creatinine Trend:  SCr 2.2 [06-03 @ 08:21]  SCr 2.1 [06-02 @ 07:16]  SCr 2.2 [06-01 @ 17:42]  SCr 2.1 [06-01 @ 07:14]  SCr 2.0 [05-31 @ 06:57]    Urinalysis - [05-25-23 @ 21:46]      Color Light Yellow / Appearance Clear / SG 1.012 / pH 5.5      Gluc 500 mg/dL / Ketone Negative  / Bili Negative / Urobili <2 mg/dL       Blood Small / Protein Trace / Leuk Est Negative / Nitrite Negative      RBC 3 / WBC 1 / Hyaline 2 / Gran  / Sq Epi  / Non Sq Epi  / Bacteria Negative    Urine Creatinine 24      [05-30-23 @ 16:40]  Urine Protein 13      [05-30-23 @ 16:40]    Iron 100, TIBC 307, %sat 33      [05-26-23 @ 10:32]  Ferritin 19      [05-26-23 @ 10:32]  Lipid: chol 88, , HDL 37, LDL --      [05-27-23 @ 07:14]      Free Light Chains: kappa 16.35, lambda 15.47, ratio = 1.06      [05-30 @ 17:37]  Immunofixation Serum:   See Note      [05-30-23 @ 17:37]  SPEP Interpretation: See Note      [05-30-23 @ 17:37]

## 2023-06-06 NOTE — PROGRESS NOTE ADULT - ASSESSMENT
70 y/o man with PMH of low grade lymphoma, DM type 2, mesenteric lymphangitis dx in 2022 (related to lymphoma and results known to his oncologist), latent TB and on INH, iron deficiency anemia on oral iron and HTN now presents with UE and LE edema and found to have microcytic anemia and MAXX.   previous notes reviewed by me  PMD: Kelsie Cooper...     #Microcytic anemia  - retic count 2, , haptoglobin WNL  - iron studies WNL but done after blood transfusion  - ferritin 19  - pt's wife states that he had EGD/colonoscopy 1 year ago here but I do not see any report in EMR (pt and wife are not good historians - denied h/o lymphoma initially)  - rule out blood loss, anemia due to BM suppression, worsening of lymphoma  - GI consult appreciated - recommend EGD/colonoscopy as outpt - continue to trend H/H - if dropping, then reconsider as inpt  - CT scan of abd/pelvis -noncontrast ----- no hemorrhage   - unable to do CTA due to MAXX  - HemOnc consulted - case discussed with the fellow - f/u recommendations - IV iron x 5 days ordered  - obtain pathology report from [facility] - biopsy by Dr. Albaro Ramos which was remarkable for low grade lymphoma.. medicine team Called the office 05/30. will send the records via fax. Team Also called PCP. As per PCP he does not have any new record regarding his lymphoma nad anemia. Only recommended to stop Isoniazid as he was taking it for a long time. Team Recalled patient oncologist office [phone] , to send record 06/01/23  - keep active T&S  - I tried calling gillian Gandhi [phone] to fax pathology report, however she did not answer phone.   -6/6 ordered 3rd prbc    # MAXX   - renal/bladder US: no hydronephrosis  - no hydronephrosis on CT scan  - check FeUrea  - daily BMP  - monitor I's & O's    #transaminitis  -monitor     # UE and LE edema   - pt without edema per last admission  -on furosemide 20mg daily at home. started on 60mg IV Lasix here. Called pt pcp dr cooper. As per pcp, pt is taking lasix as it was prescribed by his cardio dr. Patient does not know that he has a cardio dr. PCP is unaware if pt have any hx of heart failure.   -venous duplex LE-- negative   - ddimer 235  -   - Albumin 2.4 (stable from last year)  - Echo EF: 59%. No HF   - changed lasix from IV to PO 40 mg qd    # h/o low grade lymphoma  -CT scan chest: Multiple prominent subcentimeter lymph nodes within the prevascular, paraaortic and lower paratracheal regions. Additional prominent lymph nodes within the bilateral axilla.  -CT scan abd/pelvis: Multiple enlarged mesenteric lymphadenopathy. Additional enlarged retroperitoneal lymph nodes.  - seen by [facility]  - obtain pathology report from [facility]--- team Called the office 05/30. will send the records via fax  - on 06/02, team called Dr. Albaro Ramos oncologist office again today >> their office was closed today and   recorded my message for on call doctor, team gave them them unit fax number as well [phone], and they said on call doctor will contact and send pathology report  -- I tried calling gillian Gandhi [phone] to fax pathology report, however she did not answer phone. will call again    #Latent TB   - on isoniazid - clarify start date of therapy  - Called PCP. as per PCP can stop now. Stopped 05/30    #DM type 2   - monitor FS - A1C 5.6    #HTN   - on nifedipine XL60 mg qd      Diet: dash/halal  DVT prophylaxis - SCD   code : full

## 2023-06-07 ENCOUNTER — TRANSCRIPTION ENCOUNTER (OUTPATIENT)
Age: 73
End: 2023-06-07

## 2023-06-07 VITALS
TEMPERATURE: 98 F | RESPIRATION RATE: 18 BRPM | OXYGEN SATURATION: 97 % | HEART RATE: 83 BPM | SYSTOLIC BLOOD PRESSURE: 179 MMHG | DIASTOLIC BLOOD PRESSURE: 77 MMHG

## 2023-06-07 LAB
ALBUMIN SERPL ELPH-MCNC: 2.7 G/DL — LOW (ref 3.5–5.2)
ALP SERPL-CCNC: 213 U/L — HIGH (ref 30–115)
ALT FLD-CCNC: 28 U/L — SIGNIFICANT CHANGE UP (ref 0–41)
ANION GAP SERPL CALC-SCNC: 11 MMOL/L — SIGNIFICANT CHANGE UP (ref 7–14)
AST SERPL-CCNC: 66 U/L — HIGH (ref 0–41)
BASOPHILS # BLD AUTO: 0.07 K/UL — SIGNIFICANT CHANGE UP (ref 0–0.2)
BASOPHILS NFR BLD AUTO: 0.9 % — SIGNIFICANT CHANGE UP (ref 0–1)
BILIRUB SERPL-MCNC: 0.3 MG/DL — SIGNIFICANT CHANGE UP (ref 0.2–1.2)
BUN SERPL-MCNC: 29 MG/DL — HIGH (ref 10–20)
CALCIUM SERPL-MCNC: 8.6 MG/DL — SIGNIFICANT CHANGE UP (ref 8.4–10.4)
CHLORIDE SERPL-SCNC: 103 MMOL/L — SIGNIFICANT CHANGE UP (ref 98–110)
CO2 SERPL-SCNC: 26 MMOL/L — SIGNIFICANT CHANGE UP (ref 17–32)
CREAT SERPL-MCNC: 1.6 MG/DL — HIGH (ref 0.7–1.5)
EGFR: 45 ML/MIN/1.73M2 — LOW
EOSINOPHIL # BLD AUTO: 0.26 K/UL — SIGNIFICANT CHANGE UP (ref 0–0.7)
EOSINOPHIL NFR BLD AUTO: 3.2 % — SIGNIFICANT CHANGE UP (ref 0–8)
GLUCOSE SERPL-MCNC: 142 MG/DL — HIGH (ref 70–99)
HCT VFR BLD CALC: 31.8 % — LOW (ref 42–52)
HGB BLD-MCNC: 9.9 G/DL — LOW (ref 14–18)
IMM GRANULOCYTES NFR BLD AUTO: 0.4 % — HIGH (ref 0.1–0.3)
LYMPHOCYTES # BLD AUTO: 2.4 K/UL — SIGNIFICANT CHANGE UP (ref 1.2–3.4)
LYMPHOCYTES # BLD AUTO: 29.7 % — SIGNIFICANT CHANGE UP (ref 20.5–51.1)
MAGNESIUM SERPL-MCNC: 2 MG/DL — SIGNIFICANT CHANGE UP (ref 1.8–2.4)
MCHC RBC-ENTMCNC: 25.6 PG — LOW (ref 27–31)
MCHC RBC-ENTMCNC: 31.1 G/DL — LOW (ref 32–37)
MCV RBC AUTO: 82.4 FL — SIGNIFICANT CHANGE UP (ref 80–94)
MONOCYTES # BLD AUTO: 0.58 K/UL — SIGNIFICANT CHANGE UP (ref 0.1–0.6)
MONOCYTES NFR BLD AUTO: 7.2 % — SIGNIFICANT CHANGE UP (ref 1.7–9.3)
NEUTROPHILS # BLD AUTO: 4.73 K/UL — SIGNIFICANT CHANGE UP (ref 1.4–6.5)
NEUTROPHILS NFR BLD AUTO: 58.6 % — SIGNIFICANT CHANGE UP (ref 42.2–75.2)
NRBC # BLD: 0 /100 WBCS — SIGNIFICANT CHANGE UP (ref 0–0)
PLATELET # BLD AUTO: 394 K/UL — SIGNIFICANT CHANGE UP (ref 130–400)
PMV BLD: 9 FL — SIGNIFICANT CHANGE UP (ref 7.4–10.4)
POTASSIUM SERPL-MCNC: 3.6 MMOL/L — SIGNIFICANT CHANGE UP (ref 3.5–5)
POTASSIUM SERPL-SCNC: 3.6 MMOL/L — SIGNIFICANT CHANGE UP (ref 3.5–5)
PROT SERPL-MCNC: 5.2 G/DL — LOW (ref 6–8)
RBC # BLD: 3.86 M/UL — LOW (ref 4.7–6.1)
RBC # FLD: 18.4 % — HIGH (ref 11.5–14.5)
SODIUM SERPL-SCNC: 140 MMOL/L — SIGNIFICANT CHANGE UP (ref 135–146)
WBC # BLD: 8.07 K/UL — SIGNIFICANT CHANGE UP (ref 4.8–10.8)
WBC # FLD AUTO: 8.07 K/UL — SIGNIFICANT CHANGE UP (ref 4.8–10.8)

## 2023-06-07 PROCEDURE — 99239 HOSP IP/OBS DSCHRG MGMT >30: CPT

## 2023-06-07 RX ORDER — FERROUS SULFATE 325(65) MG
0 TABLET ORAL
Qty: 0 | Refills: 0 | DISCHARGE

## 2023-06-07 RX ORDER — FUROSEMIDE 40 MG
1 TABLET ORAL
Qty: 30 | Refills: 0
Start: 2023-06-07 | End: 2023-07-06

## 2023-06-07 RX ORDER — FUROSEMIDE 40 MG
1 TABLET ORAL
Qty: 0 | Refills: 0 | DISCHARGE

## 2023-06-07 RX ORDER — ALBUTEROL 90 UG/1
1 AEROSOL, METERED ORAL
Qty: 0 | Refills: 0 | DISCHARGE
Start: 2023-06-07

## 2023-06-07 RX ORDER — NIFEDIPINE 30 MG
1 TABLET, EXTENDED RELEASE 24 HR ORAL
Qty: 30 | Refills: 0
Start: 2023-06-07 | End: 2023-07-06

## 2023-06-07 RX ORDER — HEXAVITAMINS
1 TABLET ORAL
Refills: 0 | DISCHARGE

## 2023-06-07 RX ORDER — ALBUTEROL 90 UG/1
1 AEROSOL, METERED ORAL
Refills: 0 | DISCHARGE

## 2023-06-07 RX ORDER — LOSARTAN POTASSIUM 100 MG/1
1 TABLET, FILM COATED ORAL
Qty: 0 | Refills: 0 | DISCHARGE

## 2023-06-07 RX ORDER — SIMVASTATIN 20 MG/1
1 TABLET, FILM COATED ORAL
Qty: 30 | Refills: 0
Start: 2023-06-07 | End: 2023-07-06

## 2023-06-07 RX ADMIN — Medication 1 MILLIGRAM(S): at 10:48

## 2023-06-07 RX ADMIN — Medication 40 MILLIGRAM(S): at 05:59

## 2023-06-07 RX ADMIN — PANTOPRAZOLE SODIUM 40 MILLIGRAM(S): 20 TABLET, DELAYED RELEASE ORAL at 06:46

## 2023-06-07 RX ADMIN — Medication 60 MILLIGRAM(S): at 05:59

## 2023-06-07 NOTE — DISCHARGE NOTE NURSING/CASE MANAGEMENT/SOCIAL WORK - PATIENT PORTAL LINK FT
You can access the FollowMyHealth Patient Portal offered by Health system by registering at the following website: [email or web address]. By joining [facility]’s FollowMyHealth portal, you will also be able to view your health information using other applications (apps) compatible with our system.

## 2023-06-07 NOTE — DISCHARGE NOTE PROVIDER - HOSPITAL COURSE
72 y/o man with PMH of low grade lymphoma, DM type 2, mesenteric lymphangitis dx in 2022 (related to lymphoma and results known to his oncologist), latent TB and on INH, iron deficiency anemia on oral iron and HTN now presents with UE and LE edema and found to have microcytic anemia and MAXX.  PMD: Kelsie Lopez...     #Microcytic anemia  - retic count 2, , haptoglobin WNL  - iron studies WNL but done after blood transfusion  - ferritin 19  - pt's wife states that he had EGD/colonoscopy 1 year ago here but I do not see any report in EMR (pt and wife are not good historians - denied h/o lymphoma initially)  - rule out blood loss, anemia due to BM suppression, worsening of lymphoma  - GI consult appreciated - recommend EGD/colonoscopy as outpt  - CT scan of abd/pelvis -noncontrast ----- no hemorrhage   - unable to do CTA due to MAXX  - received IV iron x 5 days  - f/u as outpatient pathology report from [facility] - biopsy by Dr. Albaro Ramos which was remarkable for low grade lymphoma  - stopped Isoniazid as he was taking it for a long time.  - during hospital course received 3 prbc    # MAXX   - renal/bladder US: no hydronephrosis  - no hydronephrosis on CT scan  - downtrending    #transaminitis  -repeat labs as outpatient     # UE and LE edema   - pt without edema per last admission  -on furosemide 20mg daily at home. started on 60mg IV Lasix here, now on PO lasix 40 daily  -venous duplex LE-- negative   - ddimer 235  -   - Albumin 2.4 (stable from last year)  - Echo EF: 59%. No HF     # h/o low grade lymphoma  -CT scan chest: Multiple prominent subcentimeter lymph nodes within the prevascular, paraaortic and lower paratracheal regions. Additional prominent lymph nodes within the bilateral axilla.  -CT scan abd/pelvis: Multiple enlarged mesenteric lymphadenopathy. Additional enlarged retroperitoneal lymph nodes.  - f/u as outpatient pathology report from [facility] / Dr. Albaro Ramos     #Latent TB   -  - on admission was recieving Called PCP. as per PCP can stop now. Stopped 05/30    #DM type 2   - monitor FS - A1C 5.6    #HTN   - on nifedipine XL60 mg qd      Diet: dash/halal  DVT prophylaxis - SCD   code : full 70 y/o man with PMH of low grade lymphoma, DM type 2, mesenteric lymphangitis dx in 2022 (related to lymphoma and results known to his oncologist), latent TB and on INH, iron deficiency anemia on oral iron and HTN now presents with UE and LE edema and found to have microcytic anemia and MAXX.  PMD: Kelsie Lopez...     #Microcytic anemia  - retic count 2, , haptoglobin WNL  - iron studies WNL but done after blood transfusion  - ferritin 19  - pt's wife states that he had EGD/colonoscopy 1 year ago here but I do not see any report in EMR (pt and wife are not good historians - denied h/o lymphoma initially)  - rule out blood loss, anemia due to BM suppression, worsening of lymphoma  - GI consult appreciated - recommend EGD/colonoscopy as outpt  - CT scan of abd/pelvis -noncontrast ----- no hemorrhage   - unable to do CTA due to MAXX  - received IV iron x 5 days  - f/u as outpatient pathology report from [facility] - biopsy by Dr. Albaro Ramos which was remarkable for low grade lymphoma  - stopped Isoniazid as he was taking it for a long time.  - during hospital course received 3 prbc    # MAXX   - renal/bladder US: no hydronephrosis  - no hydronephrosis on CT scan  - downtrending    #transaminitis  -repeat labs as outpatient     # UE and LE edema   - pt without edema per last admission  -on furosemide 20mg daily at home. started on 60mg IV Lasix here, now on PO lasix 40 daily  -venous duplex LE-- negative   - ddimer 235  -   - Albumin 2.4 (stable from last year)  - Echo EF: 59%. No HF     # h/o low grade lymphoma  -CT scan chest: Multiple prominent subcentimeter lymph nodes within the prevascular, paraaortic and lower paratracheal regions. Additional prominent lymph nodes within the bilateral axilla.  -CT scan abd/pelvis: Multiple enlarged mesenteric lymphadenopathy. Additional enlarged retroperitoneal lymph nodes.  - f/u as outpatient pathology report from [facility] / Dr. Albaro Ramos     #Latent TB  - on admission was recieving Called PCP. as per PCP can stop now. Stopped 05/30 Medical attending -    Patient seen and examiend this morning  lying in bed  in nad  s/p transfusion on 6/6    Vital Signs Last 24 Hrs  T(C): 36.4 (07 Jun 2023 08:03), Max: 36.8 (06 Jun 2023 15:57)  T(F): 97.6 (07 Jun 2023 08:03), Max: 98.3 (06 Jun 2023 15:57)  HR: 83 (07 Jun 2023 08:03) (83 - 86)  BP: 179/77 (07 Jun 2023 08:03) (164/67 - 179/77)  BP(mean): --  RR: 18 (07 Jun 2023 08:03) (18 - 18)  SpO2: 97% (07 Jun 2023 08:03) (97% - 97%)    Parameters below as of 07 Jun 2023 08:03  Patient On (Oxygen Delivery Method): room air      72 y/o man with PMH of low grade lymphoma, DM type 2, mesenteric lymphangitis dx in 2022 (related to lymphoma and results known to his oncologist), latent TB and on INH, iron deficiency anemia on oral iron and HTN now presents with UE and LE edema and found to have microcytic anemia and MAXX.  PMD: Kelsie Lopez        #Microcytic anemia  - retic count 2, , haptoglobin WNL  - iron studies WNL but done after blood transfusion  - ferritin 19  - pt's wife states that he had EGD/colonoscopy 1 year ago here but I do not see any report in EMR (pt and wife are not good historians - denied h/o lymphoma initially)  - rule out blood loss, anemia due to BM suppression, worsening of lymphoma  - GI consult appreciated - recommend EGD/colonoscopy as outpt  - CT scan of abd/pelvis -noncontrast ----- no hemorrhage   - unable to do CTA due to MAXX  - received IV iron x 5 days  - f/u as outpatient pathology report from [facility] - biopsy by Dr. Albaro Ramos which was remarkable for low grade lymphoma  - stopped Isoniazid as he was taking it for a long time.  - during hospital course received 3 prbc - last transfusion on 6/6    # MAXX - resolved   - renal/bladder US: no hydronephrosis  - no hydronephrosis on CT scan  - downtrending    #transaminitis  -repeat labs as outpatient     # UE and LE edema - likely due to lymphoma  - pt without edema per last admission  -on furosemide 20mg daily at home. started on 60mg IV Lasix here, now on PO lasix 40 daily  -venous duplex LE-- negative   - ddimer 235  -   - Albumin 2.4 (stable from last year)  - Echo EF: 59%. No HF     # h/o low grade lymphoma  -CT scan chest: Multiple prominent subcentimeter lymph nodes within the prevascular, paraaortic and lower paratracheal regions. Additional prominent lymph nodes within the bilateral axilla.  -CT scan abd/pelvis: Multiple enlarged mesenteric lymphadenopathy. Additional enlarged retroperitoneal lymph nodes.  - f/u as outpatient pathology report from [facility] / Dr. Albaro Ramos     #Latent TB  - on admission was receiving Called PCP. as per PCP can stop now. Stopped 05/30    D/C today; d/c planning took over 75 min  d/c papers done by me  discussed d/c plan and all instructions in detail

## 2023-06-07 NOTE — DISCHARGE NOTE PROVIDER - NSDCCPCAREPLAN_GEN_ALL_CORE_FT
PRINCIPAL DISCHARGE DIAGNOSIS  Diagnosis: MAXX (acute kidney injury)  Assessment and Plan of Treatment: You where admitted to the hospital with an acute kidney injury, anemia and edema. Your MAXX has been improving, you need to follow up with nephrology as an outpatient. For your anemia you recieved 3 blood transfussion, you will need to see gastroenterology as an outpatient. Your edema may be due to your lymphoma, you need to follow up outpatient with oncology.     PRINCIPAL DISCHARGE DIAGNOSIS  Diagnosis: MAXX (acute kidney injury)  Assessment and Plan of Treatment: You where admitted to the hospital with an acute kidney injury, anemia and edema. Your MAXX has been improving, you need to follow up with nephrology as an outpatient. For your anemia you recieved 3 blood transfusion, you will need to see gastroenterology as an outpatient. Your edema may be due to your lymphoma, you need to follow up outpatient with oncology.

## 2023-06-07 NOTE — DISCHARGE NOTE PROVIDER - NPI NUMBER (FOR SYSADMIN USE ONLY) :
[[id number]],[UNKNOWN],[[id number]] [[id number]],[UNKNOWN]

## 2023-06-07 NOTE — DISCHARGE NOTE PROVIDER - NSDCMRMEDTOKEN_GEN_ALL_CORE_FT
Albuterol (Eqv-ProAir HFA) 90 mcg/inh inhalation aerosol: 1 puff(s) inhaled every 6 hours  aspirin 81 mg oral tablet: 1 tab(s) orally once a day  famotidine 40 mg oral tablet: 1 tab(s) orally once a day (at bedtime)  ferrous sulfate 325 mg (65 mg elemental iron) oral tablet: BID  folic acid 1 mg oral tablet: 1 tab(s) orally once a day  isoniazid 300 mg oral tablet: 1 tab(s) orally once a day  Lasix 20 mg oral tablet: 1 tab(s) orally once a day  losartan 25 mg oral tablet: 1 tab(s) orally once a day  pioglitazone 30 mg oral tablet: 1 tab(s) orally once a day  Synjardy 12.5 mg-1000 mg oral tablet: 1 tab(s) orally 2 times a day  Trulicity Pen 1.5 mg/0.5 mL subcutaneous solution:    albuterol 90 mcg/inh inhalation aerosol: 1 puff(s) inhaled every 8 hours As needed Shortness of Breath and/or Wheezing  aspirin 81 mg oral tablet: 1 tab(s) orally once a day  famotidine 40 mg oral tablet: 1 tab(s) orally once a day (at bedtime)  folic acid 1 mg oral tablet: 1 tab(s) orally once a day  Lasix 20 mg oral tablet: 1 tab(s) orally once a day  pioglitazone 30 mg oral tablet: 1 tab(s) orally once a day  Synjardy 12.5 mg-1000 mg oral tablet: 1 tab(s) orally 2 times a day  Trulicity Pen 1.5 mg/0.5 mL subcutaneous solution:    albuterol 90 mcg/inh inhalation aerosol: 1 puff(s) inhaled every 8 hours As needed Shortness of Breath and/or Wheezing  aspirin 81 mg oral tablet: 1 tab(s) orally once a day  famotidine 40 mg oral tablet: 1 tab(s) orally once a day (at bedtime)  folic acid 1 mg oral tablet: 1 tab(s) orally once a day  Lasix 40 mg oral tablet: 1 tab(s) orally once a day  NIFEdipine 60 mg oral tablet, extended release: 1 tab(s) orally once a day  pioglitazone 30 mg oral tablet: 1 tab(s) orally once a day  simvastatin 40 mg oral tablet: 1 tab(s) orally once a day (at bedtime)  Synjardy 12.5 mg-1000 mg oral tablet: 1 tab(s) orally 2 times a day  Trulicity Pen 1.5 mg/0.5 mL subcutaneous solution:

## 2023-06-07 NOTE — DISCHARGE NOTE PROVIDER - INSTRUCTIONS
Diet, Renal Restrictions:   For patients receiving Renal Replacement - No Protein Restr, No Conc K, No Conc Phos, Low Sodium  1200mL Fluid Restriction

## 2023-06-07 NOTE — DISCHARGE NOTE NURSING/CASE MANAGEMENT/SOCIAL WORK - NSDCPEFALRISK_GEN_ALL_CORE
For information on Fall & Injury Prevention, visit: [email or web address] OR  [email or web address] OR  https://www.cdc.gov/steadi/patient.html

## 2023-06-07 NOTE — DISCHARGE NOTE PROVIDER - CARE PROVIDER_API CALL
Kelsie Lopez  Family Medicine  [address] [place] [address]  Phone: [phone]  Fax: [phone]  [facility] Patient  Follow Up Time: 1 week    ANILA ARROYO  [address] [place] [address]  Phone: ()-  Fax: ()-  Follow Up Time: 2 weeks    Willard Carney  Gastroenterology  [address] [place] [address]  Phone: [phone]  Fax: [phone]  Follow Up Time: 1 week    yohannes lorenzana  [phone]  Phone: (   )    -  Fax: (   )    -  Follow Up Time:     Fan Menendez  Medical Oncology  [address] [address]  Phone: [phone]  Fax: [phone]  Follow Up Time: 1 week   Kelsie Lopez  Family Medicine  [address] [place] [address]  Phone: [phone]  Fax: [phone]  Established Patient  Follow Up Time: 1 week    ANILA ARROYO  [address] [place] [address]  Phone: ()-  Fax: ()-  Follow Up Time: 1 week    Willard Carney  Gastroenterology  [address] [place] [address]  Phone: [phone]  Fax: [phone]  Follow Up Time: 1 week    Fan Menendez  Medical Oncology  [address] [place] [address]  Phone: [phone]  Fax: [phone]  Follow Up Time: 1 week    yohannes lorenzana  [phone]  Phone: (   )    -  Fax: (   )    -  Follow Up Time: 1 week

## 2023-06-15 DIAGNOSIS — Z22.7 LATENT TUBERCULOSIS: ICD-10-CM

## 2023-06-15 DIAGNOSIS — D50.9 IRON DEFICIENCY ANEMIA, UNSPECIFIED: ICD-10-CM

## 2023-06-15 DIAGNOSIS — C85.90 NON-HODGKIN LYMPHOMA, UNSPECIFIED, UNSPECIFIED SITE: ICD-10-CM

## 2023-06-15 DIAGNOSIS — N17.9 ACUTE KIDNEY FAILURE, UNSPECIFIED: ICD-10-CM

## 2023-06-15 DIAGNOSIS — I89.1 LYMPHANGITIS: ICD-10-CM

## 2023-06-15 DIAGNOSIS — Z79.84 LONG TERM (CURRENT) USE OF ORAL HYPOGLYCEMIC DRUGS: ICD-10-CM

## 2023-06-15 DIAGNOSIS — Z79.82 LONG TERM (CURRENT) USE OF ASPIRIN: ICD-10-CM

## 2023-06-15 DIAGNOSIS — I10 ESSENTIAL (PRIMARY) HYPERTENSION: ICD-10-CM

## 2023-06-15 DIAGNOSIS — E11.9 TYPE 2 DIABETES MELLITUS WITHOUT COMPLICATIONS: ICD-10-CM

## 2023-06-15 DIAGNOSIS — D63.0 ANEMIA IN NEOPLASTIC DISEASE: ICD-10-CM

## 2023-06-15 DIAGNOSIS — E78.5 HYPERLIPIDEMIA, UNSPECIFIED: ICD-10-CM

## 2023-07-03 NOTE — PATIENT PROFILE ADULT - NSPROIMPLANTSMEDDEV_GEN_A_NUR
CRT, CVA, recurrent falls, COPD, AMINATA, CRT, CVA, recurrent falls, COPD, AMINATA, None

## 2023-09-09 ENCOUNTER — INPATIENT (INPATIENT)
Facility: HOSPITAL | Age: 73
LOS: 3 days | Discharge: HOME HEALTH | DRG: 280 | End: 2023-09-13
Attending: INTERNAL MEDICINE | Admitting: INTERNAL MEDICINE
Payer: MEDICARE

## 2023-09-09 VITALS
TEMPERATURE: 100 F | DIASTOLIC BLOOD PRESSURE: 63 MMHG | SYSTOLIC BLOOD PRESSURE: 139 MMHG | RESPIRATION RATE: 18 BRPM | HEART RATE: 85 BPM | OXYGEN SATURATION: 96 %

## 2023-09-09 DIAGNOSIS — J18.9 PNEUMONIA, UNSPECIFIED ORGANISM: ICD-10-CM

## 2023-09-09 PROBLEM — C85.90 NON-HODGKIN LYMPHOMA, UNSPECIFIED, UNSPECIFIED SITE: Chronic | Status: ACTIVE | Noted: 2023-05-26

## 2023-09-09 LAB
ALBUMIN SERPL ELPH-MCNC: 2.8 G/DL — LOW (ref 3.5–5.2)
ALP SERPL-CCNC: 100 U/L — SIGNIFICANT CHANGE UP (ref 30–115)
ALT FLD-CCNC: 16 U/L — SIGNIFICANT CHANGE UP (ref 0–41)
ANION GAP SERPL CALC-SCNC: 11 MMOL/L — SIGNIFICANT CHANGE UP (ref 7–14)
AST SERPL-CCNC: 52 U/L — HIGH (ref 0–41)
BASOPHILS # BLD AUTO: 0.02 K/UL — SIGNIFICANT CHANGE UP (ref 0–0.2)
BASOPHILS NFR BLD AUTO: 0.3 % — SIGNIFICANT CHANGE UP (ref 0–1)
BILIRUB SERPL-MCNC: 0.3 MG/DL — SIGNIFICANT CHANGE UP (ref 0.2–1.2)
BUN SERPL-MCNC: 42 MG/DL — HIGH (ref 10–20)
CALCIUM SERPL-MCNC: 7.9 MG/DL — LOW (ref 8.4–10.5)
CHLORIDE SERPL-SCNC: 98 MMOL/L — SIGNIFICANT CHANGE UP (ref 98–110)
CO2 SERPL-SCNC: 25 MMOL/L — SIGNIFICANT CHANGE UP (ref 17–32)
CREAT SERPL-MCNC: 2.5 MG/DL — HIGH (ref 0.7–1.5)
EGFR: 26 ML/MIN/1.73M2 — LOW
EOSINOPHIL # BLD AUTO: 0.13 K/UL — SIGNIFICANT CHANGE UP (ref 0–0.7)
EOSINOPHIL NFR BLD AUTO: 2.2 % — SIGNIFICANT CHANGE UP (ref 0–8)
FLUAV AG NPH QL: SIGNIFICANT CHANGE UP
FLUBV AG NPH QL: SIGNIFICANT CHANGE UP
GAS PNL BLDV: SIGNIFICANT CHANGE UP
GLUCOSE BLDC GLUCOMTR-MCNC: 121 MG/DL — HIGH (ref 70–99)
GLUCOSE SERPL-MCNC: 142 MG/DL — HIGH (ref 70–99)
HCT VFR BLD CALC: 22.9 % — LOW (ref 42–52)
HGB BLD-MCNC: 7.3 G/DL — LOW (ref 14–18)
IMM GRANULOCYTES NFR BLD AUTO: 0.8 % — HIGH (ref 0.1–0.3)
LYMPHOCYTES # BLD AUTO: 0.98 K/UL — LOW (ref 1.2–3.4)
LYMPHOCYTES # BLD AUTO: 16.4 % — LOW (ref 20.5–51.1)
MCHC RBC-ENTMCNC: 27.8 PG — SIGNIFICANT CHANGE UP (ref 27–31)
MCHC RBC-ENTMCNC: 31.9 G/DL — LOW (ref 32–37)
MCV RBC AUTO: 87.1 FL — SIGNIFICANT CHANGE UP (ref 80–94)
MONOCYTES # BLD AUTO: 0.53 K/UL — SIGNIFICANT CHANGE UP (ref 0.1–0.6)
MONOCYTES NFR BLD AUTO: 8.9 % — SIGNIFICANT CHANGE UP (ref 1.7–9.3)
NEUTROPHILS # BLD AUTO: 4.27 K/UL — SIGNIFICANT CHANGE UP (ref 1.4–6.5)
NEUTROPHILS NFR BLD AUTO: 71.4 % — SIGNIFICANT CHANGE UP (ref 42.2–75.2)
NRBC # BLD: 0 /100 WBCS — SIGNIFICANT CHANGE UP (ref 0–0)
NT-PROBNP SERPL-SCNC: 2123 PG/ML — HIGH (ref 0–300)
PLATELET # BLD AUTO: 199 K/UL — SIGNIFICANT CHANGE UP (ref 130–400)
PMV BLD: 9.8 FL — SIGNIFICANT CHANGE UP (ref 7.4–10.4)
POTASSIUM SERPL-MCNC: 4.4 MMOL/L — SIGNIFICANT CHANGE UP (ref 3.5–5)
POTASSIUM SERPL-SCNC: 4.4 MMOL/L — SIGNIFICANT CHANGE UP (ref 3.5–5)
PROT SERPL-MCNC: 5.3 G/DL — LOW (ref 6–8)
RBC # BLD: 2.63 M/UL — LOW (ref 4.7–6.1)
RBC # FLD: 13.9 % — SIGNIFICANT CHANGE UP (ref 11.5–14.5)
RSV RNA NPH QL NAA+NON-PROBE: SIGNIFICANT CHANGE UP
SARS-COV-2 RNA SPEC QL NAA+PROBE: SIGNIFICANT CHANGE UP
SODIUM SERPL-SCNC: 134 MMOL/L — LOW (ref 135–146)
TROPONIN T SERPL-MCNC: 0.19 NG/ML — CRITICAL HIGH
WBC # BLD: 5.98 K/UL — SIGNIFICANT CHANGE UP (ref 4.8–10.8)
WBC # FLD AUTO: 5.98 K/UL — SIGNIFICANT CHANGE UP (ref 4.8–10.8)

## 2023-09-09 PROCEDURE — 76770 US EXAM ABDO BACK WALL COMP: CPT

## 2023-09-09 PROCEDURE — 87070 CULTURE OTHR SPECIMN AEROBIC: CPT

## 2023-09-09 PROCEDURE — 97163 PT EVAL HIGH COMPLEX 45 MIN: CPT | Mod: GP

## 2023-09-09 PROCEDURE — 0225U NFCT DS DNA&RNA 21 SARSCOV2: CPT

## 2023-09-09 PROCEDURE — 87641 MR-STAPH DNA AMP PROBE: CPT

## 2023-09-09 PROCEDURE — 87116 MYCOBACTERIA CULTURE: CPT

## 2023-09-09 PROCEDURE — 84466 ASSAY OF TRANSFERRIN: CPT

## 2023-09-09 PROCEDURE — 85027 COMPLETE CBC AUTOMATED: CPT

## 2023-09-09 PROCEDURE — 86900 BLOOD TYPING SEROLOGIC ABO: CPT

## 2023-09-09 PROCEDURE — 83036 HEMOGLOBIN GLYCOSYLATED A1C: CPT

## 2023-09-09 PROCEDURE — 71045 X-RAY EXAM CHEST 1 VIEW: CPT | Mod: 26

## 2023-09-09 PROCEDURE — 86901 BLOOD TYPING SEROLOGIC RH(D): CPT

## 2023-09-09 PROCEDURE — 93010 ELECTROCARDIOGRAM REPORT: CPT

## 2023-09-09 PROCEDURE — 87449 NOS EACH ORGANISM AG IA: CPT

## 2023-09-09 PROCEDURE — 97530 THERAPEUTIC ACTIVITIES: CPT | Mod: GP

## 2023-09-09 PROCEDURE — 99285 EMERGENCY DEPT VISIT HI MDM: CPT

## 2023-09-09 PROCEDURE — 36415 COLL VENOUS BLD VENIPUNCTURE: CPT

## 2023-09-09 PROCEDURE — 83540 ASSAY OF IRON: CPT

## 2023-09-09 PROCEDURE — 87040 BLOOD CULTURE FOR BACTERIA: CPT

## 2023-09-09 PROCEDURE — 94640 AIRWAY INHALATION TREATMENT: CPT

## 2023-09-09 PROCEDURE — 82728 ASSAY OF FERRITIN: CPT

## 2023-09-09 PROCEDURE — 87640 STAPH A DNA AMP PROBE: CPT

## 2023-09-09 PROCEDURE — 87015 SPECIMEN INFECT AGNT CONCNTJ: CPT

## 2023-09-09 PROCEDURE — 93970 EXTREMITY STUDY: CPT

## 2023-09-09 PROCEDURE — 80053 COMPREHEN METABOLIC PANEL: CPT

## 2023-09-09 PROCEDURE — 93306 TTE W/DOPPLER COMPLETE: CPT

## 2023-09-09 PROCEDURE — 84484 ASSAY OF TROPONIN QUANT: CPT

## 2023-09-09 PROCEDURE — 82962 GLUCOSE BLOOD TEST: CPT

## 2023-09-09 PROCEDURE — 86850 RBC ANTIBODY SCREEN: CPT

## 2023-09-09 PROCEDURE — 71045 X-RAY EXAM CHEST 1 VIEW: CPT

## 2023-09-09 PROCEDURE — 87206 SMEAR FLUORESCENT/ACID STAI: CPT

## 2023-09-09 PROCEDURE — 83735 ASSAY OF MAGNESIUM: CPT

## 2023-09-09 PROCEDURE — 84145 PROCALCITONIN (PCT): CPT

## 2023-09-09 PROCEDURE — 84100 ASSAY OF PHOSPHORUS: CPT

## 2023-09-09 RX ORDER — GLUCAGON INJECTION, SOLUTION 0.5 MG/.1ML
1 INJECTION, SOLUTION SUBCUTANEOUS ONCE
Refills: 0 | Status: DISCONTINUED | OUTPATIENT
Start: 2023-09-09 | End: 2023-09-13

## 2023-09-09 RX ORDER — HEPARIN SODIUM 5000 [USP'U]/ML
5000 INJECTION INTRAVENOUS; SUBCUTANEOUS EVERY 12 HOURS
Refills: 0 | Status: DISCONTINUED | OUTPATIENT
Start: 2023-09-09 | End: 2023-09-13

## 2023-09-09 RX ORDER — FAMOTIDINE 10 MG/ML
1 INJECTION INTRAVENOUS
Qty: 0 | Refills: 0 | DISCHARGE

## 2023-09-09 RX ORDER — FUROSEMIDE 40 MG
40 TABLET ORAL DAILY
Refills: 0 | Status: DISCONTINUED | OUTPATIENT
Start: 2023-09-09 | End: 2023-09-12

## 2023-09-09 RX ORDER — DEXTROSE 50 % IN WATER 50 %
25 SYRINGE (ML) INTRAVENOUS ONCE
Refills: 0 | Status: DISCONTINUED | OUTPATIENT
Start: 2023-09-09 | End: 2023-09-13

## 2023-09-09 RX ORDER — AZITHROMYCIN 500 MG/1
500 TABLET, FILM COATED ORAL ONCE
Refills: 0 | Status: COMPLETED | OUTPATIENT
Start: 2023-09-09 | End: 2023-09-09

## 2023-09-09 RX ORDER — CHLORHEXIDINE GLUCONATE 213 G/1000ML
1 SOLUTION TOPICAL
Refills: 0 | Status: DISCONTINUED | OUTPATIENT
Start: 2023-09-09 | End: 2023-09-13

## 2023-09-09 RX ORDER — ALBUTEROL 90 UG/1
2 AEROSOL, METERED ORAL EVERY 6 HOURS
Refills: 0 | Status: DISCONTINUED | OUTPATIENT
Start: 2023-09-09 | End: 2023-09-13

## 2023-09-09 RX ORDER — VANCOMYCIN HCL 1 G
1250 VIAL (EA) INTRAVENOUS EVERY 24 HOURS
Refills: 0 | Status: DISCONTINUED | OUTPATIENT
Start: 2023-09-09 | End: 2023-09-11

## 2023-09-09 RX ORDER — INSULIN LISPRO 100/ML
VIAL (ML) SUBCUTANEOUS AT BEDTIME
Refills: 0 | Status: DISCONTINUED | OUTPATIENT
Start: 2023-09-09 | End: 2023-09-13

## 2023-09-09 RX ORDER — ASPIRIN/CALCIUM CARB/MAGNESIUM 324 MG
81 TABLET ORAL DAILY
Refills: 0 | Status: DISCONTINUED | OUTPATIENT
Start: 2023-09-09 | End: 2023-09-13

## 2023-09-09 RX ORDER — LOSARTAN POTASSIUM 100 MG/1
1 TABLET, FILM COATED ORAL
Refills: 0 | DISCHARGE

## 2023-09-09 RX ORDER — IPRATROPIUM/ALBUTEROL SULFATE 18-103MCG
3 AEROSOL WITH ADAPTER (GRAM) INHALATION EVERY 6 HOURS
Refills: 0 | Status: DISCONTINUED | OUTPATIENT
Start: 2023-09-09 | End: 2023-09-13

## 2023-09-09 RX ORDER — SIMVASTATIN 20 MG/1
40 TABLET, FILM COATED ORAL AT BEDTIME
Refills: 0 | Status: DISCONTINUED | OUTPATIENT
Start: 2023-09-09 | End: 2023-09-13

## 2023-09-09 RX ORDER — SODIUM CHLORIDE 9 MG/ML
1000 INJECTION, SOLUTION INTRAVENOUS
Refills: 0 | Status: DISCONTINUED | OUTPATIENT
Start: 2023-09-09 | End: 2023-09-13

## 2023-09-09 RX ORDER — BUDESONIDE AND FORMOTEROL FUMARATE DIHYDRATE 160; 4.5 UG/1; UG/1
2 AEROSOL RESPIRATORY (INHALATION)
Refills: 0 | Status: DISCONTINUED | OUTPATIENT
Start: 2023-09-09 | End: 2023-09-13

## 2023-09-09 RX ORDER — DEXTROSE 50 % IN WATER 50 %
12.5 SYRINGE (ML) INTRAVENOUS ONCE
Refills: 0 | Status: DISCONTINUED | OUTPATIENT
Start: 2023-09-09 | End: 2023-09-13

## 2023-09-09 RX ORDER — FOLIC ACID 0.8 MG
1 TABLET ORAL
Qty: 0 | Refills: 0 | DISCHARGE

## 2023-09-09 RX ORDER — LANOLIN ALCOHOL/MO/W.PET/CERES
3 CREAM (GRAM) TOPICAL AT BEDTIME
Refills: 0 | Status: DISCONTINUED | OUTPATIENT
Start: 2023-09-09 | End: 2023-09-13

## 2023-09-09 RX ORDER — CEFTRIAXONE 500 MG/1
1000 INJECTION, POWDER, FOR SOLUTION INTRAMUSCULAR; INTRAVENOUS ONCE
Refills: 0 | Status: COMPLETED | OUTPATIENT
Start: 2023-09-09 | End: 2023-09-09

## 2023-09-09 RX ORDER — DEXTROSE 50 % IN WATER 50 %
15 SYRINGE (ML) INTRAVENOUS ONCE
Refills: 0 | Status: DISCONTINUED | OUTPATIENT
Start: 2023-09-09 | End: 2023-09-13

## 2023-09-09 RX ORDER — ACETAMINOPHEN 500 MG
650 TABLET ORAL EVERY 6 HOURS
Refills: 0 | Status: DISCONTINUED | OUTPATIENT
Start: 2023-09-09 | End: 2023-09-13

## 2023-09-09 RX ORDER — INSULIN LISPRO 100/ML
VIAL (ML) SUBCUTANEOUS
Refills: 0 | Status: DISCONTINUED | OUTPATIENT
Start: 2023-09-09 | End: 2023-09-13

## 2023-09-09 RX ORDER — CEFEPIME 1 G/1
2000 INJECTION, POWDER, FOR SOLUTION INTRAMUSCULAR; INTRAVENOUS EVERY 12 HOURS
Refills: 0 | Status: DISCONTINUED | OUTPATIENT
Start: 2023-09-09 | End: 2023-09-13

## 2023-09-09 RX ADMIN — CEFTRIAXONE 100 MILLIGRAM(S): 500 INJECTION, POWDER, FOR SOLUTION INTRAMUSCULAR; INTRAVENOUS at 15:43

## 2023-09-09 RX ADMIN — AZITHROMYCIN 255 MILLIGRAM(S): 500 TABLET, FILM COATED ORAL at 16:36

## 2023-09-09 RX ADMIN — Medication 40 MILLIGRAM(S): at 23:34

## 2023-09-09 RX ADMIN — Medication 3 MILLILITER(S): at 20:17

## 2023-09-09 NOTE — H&P ADULT - ATTENDING COMMENTS
72 year old male with PMH of HTN, DM2, HLD, HFPEF, CKD 3a,  latent TB s/p treatment ~6/2023, follicular lymphoma of stomach dx 2018  on Rituximab  Revlimid came to ED for worsening cough and epistaxis, he started with cough and SOB 98/1,  he was getting worse with productive cough and leg edema, in ED Tmax 99.5, WBC 5.98 w neutrophils 4.27, trop 0.19 and BNP 2123, Cr 2.5, CXR showed RLL opacity    PHYSICAL EXAM:  GENERAL: ill-looking, mild respiratory distress. .  HEAD:  Atraumatic, Normocephalic.  EYES: EOMI, PERRLA, conjunctiva and sclera clear.  NECK: Supple, No JVD.  CHEST/LUNG: mild wheezing, ronchi, right lower lung crackles.   HEART: Regular rate and rhythm; S1 S2.   ABDOMEN: Soft, Nontender, Nondistended; Bowel sounds present.  EXTREMITIES:  2+ Peripheral Pulses, improving leg edema+  PSYCH: AAOx3.  NEUROLOGY: non-focal.  SKIN: No rashes or lesions.    A/P:   Right lower lung Pneumonia: suspected gram negative in patient with immunosuppression.   COPD exacerbation:   Patient with cough and SOB  CXR showed right lower alis opacity.   RVP negative, Nasal MRSA negative.   Pending urine legionella, strep.   Started on Vancomycin and Cefepime, discontinue Vancomycin, continue Cefepime, add Zithromax.   Continue DuoNeb, Symbicort. add Solu-Medrol 40mg q 12 hrs.     Acute HFpEF:   Patient with SOB, leg edema, Pro-BNP 2100  Continue Lasix 40mg IV BID, leg edema is improving.     NSTEMI:   No chest pain.  Troponin 0.19>0.17, was 0.06 in Une 2023.   EKG showed non specific T changes.   Continue ASA and Simvastatin.     MAXX on CKD 3  Cr 2.5 , baseline 1.6, jameel cardiorenal.   Hold Losartan, continue Diuresis    Follicular lymphoma of stomach dx 2018   now follows Dr Reese Tirado @ [facility] [phone], on Rituximab since 8/17/23 w last dose 9/7/23 which pt tolerated well, and Revlimid which pt started 9/1/23 and last dose 9/7/23    Anemia, normocytic   s/p iron infusions x5 on recent admission  recent epistaxis which resolved after d/c revlimid   no further bleeding episodes      HTN - hold Losartan given MAXX     DM - On oral antidiabetics at home - insulin regimen while admitted       # DVT prophylaxis: Heparin 5000u BID

## 2023-09-09 NOTE — H&P ADULT - NSHPPHYSICALEXAM_GEN_ALL_CORE
GENERAL: NAD, lying in bed comfortably but has rhonchorous cough intermittently   HEAD:  Atraumatic, Normocephalic  EYES: EOMI, sclera clear  ENT: Moist mucous membranes  NECK: Supple, trachea midline  CHEST/LUNG: Crackles diffusely   HEART: Regular rate and rhythm; No appreciable murmurs, rubs, or gallops  ABDOMEN: (+)BS; Soft, nontender, nondistended  EXTREMITIES:  2+ Radial Pulses, brisk capillary refill. 2+ edema L>R in BLE   NERVOUS SYSTEM:  A&Ox3, no noted facial droop.   SKIN: No rashes or lesions to b/l forearm, face.

## 2023-09-09 NOTE — ED PROVIDER NOTE - PROGRESS NOTE DETAILS
KA - Patient and family informed of results.  Agreeable to admission for pneumonia, MAXX, elevated Troponin.  Will admit to med telemetry.

## 2023-09-09 NOTE — ED PROVIDER NOTE - CARE PLAN
1 Principal Discharge DX:	Pneumonia  Secondary Diagnosis:	MAXX (acute kidney injury)  Secondary Diagnosis:	Elevated troponin

## 2023-09-09 NOTE — H&P ADULT - ASSESSMENT
Asya speaking 73 y/o man w PMHx of HTN, HLD, NIDDM, HFpEF w G1DD, CKD 3a w Cr 1.6, h/o latent TB s/p treatment ~6/2023, follicular lymphoma of stomach dx 2018 now follows Dr Reese Tirado @ [facility] [phone], on Rituximab since 8/17/23 w last dose 9/7/23 which pt tolerated well, and Revlimid which pt started 9/1/23 and last dose 9/7/23, as pt developed cough and epistaxis and was told by Dr Tirado to d/c Revlimid for now, has had SOB since 9/1/23 and productive cough since 9/5/23, had outpatient CXR w Dr Tirado which was reportedly unremarkable but when seen by PCP earlier today was told to go to ED for treatment of PNA, in ED found to have RLL opacity and given azithromycin/CTX, Tmax 99.5, WBC 5.98 w neutrophils 4.27, trop 0.19 and BNP 2123, Cr 2.5, admitted to medicine for PNA though likely also has HF exacerbation and needs ACS r/o.   HCP wife Mili Alonzo [phone]  Daughter Oksana Guzman [phone]    #PNA  #COPD   #SOB, Productive cough   SIRS (-) but has likely source, is immunocompromised, may not be able to mount full immune response.   DDx PNA most likely given RLL opacity, vs URI vs COPD exacerbation/bronchitis vs less likely TB reactivation in pt who hs already been treated for TB prior to starting chemo. Overlap HF exacerbation as well.   - Vanc/Cefepime in immunocompromised pt on chemo, though not neutropenic   - procal pending  - Expanded RVP, MRSA, sputum culture, acid fast sputum culture x3   - Pending BCx  - Albuterol, Nebs, Symbicort  - NC 2L on admission, but no home O2 requirement - wean off as tolerated     #HFpEF w G1DD  #HF exacerbation  #elevated trop   Significant crackles on exam, BLE edema pitting 2+  DDx HF exacerbation, but will need to evaluate for ACS as well. Trop may be elevated 2/2 MAXX vs demand as well.   - trop 0.19 from baseline 0.04, BNP 2123 w prior 600 on recent admission   - EKG on admission w/o changes, trend for EKG changes   - home Lasix 20mg PO QD> continue as 40mg IV QD for now   - Strict I&Os, daily weight    #MAXX on CKD 3a w baseline Cr 1.6  Cr 2.5 on admission  - Hold home Losartan   - Increased Lasix as noted above, but may start nifedipine as needed for goal -140s  - Consult Nephro if worsening CKD/need for HD  - Low K/phos diet     #Anemia, normocytic   #h/o SHAI   s/p iron infusions x5 on recent admission  recent epistaxis which resolved after d/c revlimid   no further bleeding episodes  - if trop increases and pt w ACS on this admission, Hb goal will be 8+     #HTN - hold Losartan given MAXX   #HLD - cont statin   #NIDDM - On oral antidiabetics at home - insulin regimen while admitted     #Follicular lymphoma of stomach dx 2018   now follows Dr Reese Tirado @ [facility] [phone], on Rituximab since 8/17/23 w last dose 9/7/23 which pt tolerated well, and Revlimid which pt started 9/1/23 and last dose 9/7/23                                                                              ----------------------------------------------------  # DVT prophylaxis: Heparin 5000u BID   # GI prophylaxis: N/A   # Diet: DASH TLC CC low K/phos 1200cc +glucerna   # Activity: Ambulate as Tolerated   # Code status: FULL CODE   # Disposition: Tele                                                                           --------------------------------------------------------    # Handoff:   - f/u procal  - infectious w/u   - trop/EKGs  - diuresis Asya speaking 73 y/o man w PMHx of HTN, HLD, NIDDM, HFpEF w G1DD, CKD 3a w Cr 1.6, h/o latent TB s/p treatment ~6/2023, follicular lymphoma of stomach dx 2018 now follows Dr Reese Tirado @ [facility] [phone], on Rituximab since 8/17/23 w last dose 9/7/23 which pt tolerated well, and Revlimid which pt started 9/1/23 and last dose 9/7/23, as pt developed cough and epistaxis and was told by Dr Tirado to d/c Revlimid for now, has had SOB since 9/1/23 and productive cough since 9/5/23, had outpatient CXR w Dr Tirado which was reportedly unremarkable but when seen by PCP earlier today was told to go to ED for treatment of PNA, in ED found to have RLL opacity and given azithromycin/CTX, Tmax 99.5, WBC 5.98 w neutrophils 4.27, trop 0.19 and BNP 2123, Cr 2.5, admitted to medicine for PNA though likely also has HF exacerbation and needs ACS r/o.   HCP wife Mili Alonzo [phone]  Daughter Oksana Guzman [phone]    #PNA  #COPD   #SOB, Productive cough   SIRS (-) but has likely source, is immunocompromised, may not be able to mount full immune response.   DDx PNA most likely given RLL opacity, vs URI vs COPD exacerbation/bronchitis vs less likely TB reactivation in pt who hs already been treated for TB prior to starting chemo. Overlap HF exacerbation as well.   - Vanc/Cefepime in immunocompromised pt on chemo, though not neutropenic   - procal pending  - Expanded RVP, MRSA, sputum culture, acid fast sputum culture x3   - Pending BCx  - Albuterol, Nebs, Symbicort  - NC 2L on admission, but no home O2 requirement - wean off as tolerated     #HFpEF w G1DD  #HF exacerbation  #elevated trop   Significant crackles on exam, BLE edema pitting 2+  DDx HF exacerbation, but will need to evaluate for ACS as well. Trop may be elevated 2/2 MAXX vs demand as well.   - trop 0.19 from baseline 0.04, BNP 2123 w prior 600 on recent admission   - EKG on admission w/o changes, trend for EKG changes   - home Lasix 20mg PO QD> continue as 40mg IV QD for now   - Strict I&Os, daily weight    #MAXX on CKD 3a w baseline Cr 1.6  Cr 2.5 on admission  - Hold home Losartan   - Increased Lasix as noted above, but may start nifedipine as needed for goal -140s  - Consult Nephro if worsening CKD/need for HD  - Low K/phos diet     #Hypocalcemia, mild  Ca 7.9, has had similar values in the past but family endorses spasms of BUE. Will give 1g Calcium gluconate as this may be symptom of hypocalcemia. [place] sign negative.   - 1g Ca gluconate   - EKG reviewed    #Anemia, normocytic   #h/o SHAI   s/p iron infusions x5 on recent admission  recent epistaxis which resolved after d/c revlimid   no further bleeding episodes  - if trop increases and pt w ACS on this admission, Hb goal will be 8+     #HTN - hold Losartan given MAXX   #HLD - cont statin   #NIDDM - On oral antidiabetics at home - insulin regimen while admitted     #Follicular lymphoma of stomach dx 2018   now follows Dr Reese Tirado @ [facility] [phone], on Rituximab since 8/17/23 w last dose 9/7/23 which pt tolerated well, and Revlimid which pt started 9/1/23 and last dose 9/7/23                                                                              ----------------------------------------------------  # DVT prophylaxis: Heparin 5000u BID   # GI prophylaxis: N/A   # Diet: DASH TLC CC low K/phos 1200cc +glucerna   # Activity: Ambulate as Tolerated   # Code status: FULL CODE   # Disposition: Tele                                                                           --------------------------------------------------------    # Handoff:   - f/u procal  - infectious w/u   - trop/EKGs  - diuresis

## 2023-09-09 NOTE — ED PROVIDER NOTE - CLINICAL SUMMARY MEDICAL DECISION MAKING FREE TEXT BOX
73 y/o man w PMHx of HTN, HLD, NIDDM, HFpEF w G1DD, CKD 3a w Cr 1.6, h/o latent TB s/p treatment ~6/2023, follicular lymphoma of stomach dx 2018 now follows Dr Reese Tirado @ [facility] [phone], on Rituximab since 8/17/23 w last dose 9/7/23 which pt tolerated well, and Revlimid which pt started 9/1/23 and last dose 9/7/23, as pt developed cough and epistaxis and was told by Dr Tirado to d/c Revlimid for now, has had SOB since 9/1/23 and productive cough since 9/5/23, had outpatient CXR w Dr Tirado which was reportedly unremarkable but when seen by PCP earlier today was told to go to ED for treatment of PNA.  In ED, VSS.  CXR shows a RLL PNA suspicious for aspiration.  ADMIT for IV Abx.

## 2023-09-09 NOTE — H&P ADULT - NSICDXPASTMEDICALHX_GEN_ALL_CORE_FT
PAST MEDICAL HISTORY:  Chronic heart failure with preserved ejection fraction     Diabetes mellitus     HTN (hypertension)     Hyperlipidemia     Iron deficiency anemia     Lymphoma     Stage 3 chronic kidney disease     Treatment completed for tuberculosis

## 2023-09-09 NOTE — H&P ADULT - CONVERSATION DETAILS
Explained to pt that unless otherwise stated, in a hospital everyone is treated as full code, meaning if needed in an emergency, compressions are done when a pt's heart does not beat on its own and intubation and ventilation are done when a pt cannot breathe on their own. Asked whether this is something that pt would want if ever required as a life sustaining measure.   States yes - understands that this is the default, would want this done for him. Full code at this time.     Asked whether pt has anyone who they would want to make their healthcare decisions for them if they were too confused, not awake, or otherwise unable to do so.   HCP wife Mili Alonzo [phone]

## 2023-09-09 NOTE — H&P ADULT - HISTORY OF PRESENT ILLNESS
Asya speaking 73 y/o man w PMHx of HTN, HLD, NIDDM, HFpEF w G1DD, CKD 3a w Cr 1.6, h/o latent TB s/p treatment ~6/2023, follicular lymphoma of stomach dx 2018 now follows Dr Reese Tirado @ [facility] [phone], on Rituximab since 8/17/23 w last dose 9/7/23 which pt tolerated well, and Revlimid which pt started 9/1/23 and last dose 9/7/23, as pt developed cough and epistaxis and was told by Dr Tirado to d/c Revlimid for now, has had SOB since 9/1/23 and productive cough since 9/5/23, had outpatient CXR w Dr Tirado which was reportedly unremarkable but when seen by PCP earlier today was told to go to ED for treatment of PNA, in ED found to have RLL opacity and given azithromycin/CTX, Tmax 99.5, WBC 5.98 w neutrophils 4.27, trop 0.19 and BNP 2123, Cr 2.5, admitted to medicine for PNA though likely also has HF exacerbation and needs ACS r/o.   HCP wife Mili Alonzo [phone]  Daughter Oksana Guzman [phone]    Pt reports had been tolerating Rituximab well but after starting Revlimib has been feeling unwell- including SOB, cough, nosebleeds of total 4-5 episodes. Pt and wife have not noted worsening LE edema, but notes has been chronic x1year - recent admission notable for MAXX and LE edema which required Lasix 60mg IV w eventual transition to 40mg PO QD, echo showing G1DD. Has stopped Revlimid w last dose 9/7/23 but no other changes in medications. No known sick contacts. No CP, n/v/d, abd pain. No urinary symptoms. No hematemesis, hematochezia, melena.     Triage vitals were: 139/63, HR 85bpm, RR 18 w 96% on RA, 99.5F  Repeat vitals notable for: 166/72, RR18 w 98% on 2L NC  Labs notable for: WBC 5.98 w neutrophils 4.27, trop 0.19 and BNP 2123  Imaging: CXR w RLL opacity   Treatments: Azithromycin/CTX

## 2023-09-09 NOTE — ED PROVIDER NOTE - PHYSICAL EXAMINATION
As Follows:  CONST: Well appearing in NAD  EYES: PERRL, EOMI, Sclera and conjunctiva clear.   ENT: No nasal discharge. Oropharynx normal appearing, no erythema or exudates. Uvula midline  CARD: No murmurs, rubs, or gallops; Normal rate and rhythm  RESP: BS Equal B/L, No wheezes, rhonchi or rales. No distress or accessory breathing  GI: Soft, non-tender, non-distended.  MS: Normal ROM in all extremities. No midline Cervical/Thoracic/Lumbar spinal tenderness.  SKIN: Warm, dry, no acute rashes. MMM  NEURO: A&Ox3, No focal deficits. Strength and sensation intact.

## 2023-09-09 NOTE — ED PROVIDER NOTE - OBJECTIVE STATEMENT
Patient is a 72 year old male with pmhx of HTN, HLD, DM, HF, CKD, follicular lymphoma of stomach dx 2018 now follows Dr Reese Tirado @ [facility] [phone] newly started on Revlimid which pt started 9/1/23 with last dose 9/7/23, presenting for evaluation of cough and epistaxis. He developed shortness of breath followed by productive cough. He denies any fever, chills, sore throat, N/V, chest pain, abdominal pain, or urinary complaints.

## 2023-09-09 NOTE — ED ADULT NURSE NOTE - NSFALLHARMRISKINTERV_ED_ALL_ED
Assistance OOB with selected safe patient handling equipment if applicable/Assistance with ambulation/Communicate risk of Fall with Harm to all staff, patient, and family/Encourage patient to sit up slowly, dangle for a short time, stand at bedside before walking/Monitor gait and stability/Monitor for mental status changes and reorient to person, place, and time, as needed/Move patient closer to nursing station/within visual sight of ED staff/Provide patient with walking aids/Provide visual cue: red socks, yellow wristband, yellow gown, etc/Reinforce activity limits and safety measures with patient and family/Review medications for side effects contributing to fall risk/Toileting schedule using arm’s reach rule for commode and bathroom/Use of alarms - bed, stretcher, chair and/or video monitoring/Bed in lowest position, wheels locked, appropriate side rails in place/Call bell, personal items and telephone in reach/Instruct patient to call for assistance before getting out of bed/chair/stretcher/Non-slip footwear applied when patient is off stretcher/[place] to call system/Physically safe environment - no spills, clutter or unnecessary equipment/Purposeful Proactive Rounding/Room/bathroom lighting operational, light cord in reach

## 2023-09-09 NOTE — PATIENT PROFILE ADULT - FALL HARM RISK - RISK INTERVENTIONS

## 2023-09-10 LAB
ALBUMIN SERPL ELPH-MCNC: 2.8 G/DL — LOW (ref 3.5–5.2)
ALP SERPL-CCNC: 92 U/L — SIGNIFICANT CHANGE UP (ref 30–115)
ALT FLD-CCNC: 15 U/L — SIGNIFICANT CHANGE UP (ref 0–41)
ANION GAP SERPL CALC-SCNC: 10 MMOL/L — SIGNIFICANT CHANGE UP (ref 7–14)
AST SERPL-CCNC: 51 U/L — HIGH (ref 0–41)
BILIRUB SERPL-MCNC: 0.3 MG/DL — SIGNIFICANT CHANGE UP (ref 0.2–1.2)
BLD GP AB SCN SERPL QL: SIGNIFICANT CHANGE UP
BUN SERPL-MCNC: 40 MG/DL — HIGH (ref 10–20)
CALCIUM SERPL-MCNC: 8.1 MG/DL — LOW (ref 8.4–10.5)
CHLORIDE SERPL-SCNC: 98 MMOL/L — SIGNIFICANT CHANGE UP (ref 98–110)
CO2 SERPL-SCNC: 27 MMOL/L — SIGNIFICANT CHANGE UP (ref 17–32)
CREAT SERPL-MCNC: 2.6 MG/DL — HIGH (ref 0.7–1.5)
EGFR: 25 ML/MIN/1.73M2 — LOW
GLUCOSE BLDC GLUCOMTR-MCNC: 137 MG/DL — HIGH (ref 70–99)
GLUCOSE BLDC GLUCOMTR-MCNC: 207 MG/DL — HIGH (ref 70–99)
GLUCOSE BLDC GLUCOMTR-MCNC: 305 MG/DL — HIGH (ref 70–99)
GLUCOSE BLDC GLUCOMTR-MCNC: 91 MG/DL — SIGNIFICANT CHANGE UP (ref 70–99)
GLUCOSE SERPL-MCNC: 101 MG/DL — HIGH (ref 70–99)
HCT VFR BLD CALC: 23.5 % — LOW (ref 42–52)
HGB BLD-MCNC: 7.4 G/DL — LOW (ref 14–18)
MAGNESIUM SERPL-MCNC: 1.8 MG/DL — SIGNIFICANT CHANGE UP (ref 1.8–2.4)
MCHC RBC-ENTMCNC: 27.1 PG — SIGNIFICANT CHANGE UP (ref 27–31)
MCHC RBC-ENTMCNC: 31.5 G/DL — LOW (ref 32–37)
MCV RBC AUTO: 86.1 FL — SIGNIFICANT CHANGE UP (ref 80–94)
MRSA PCR RESULT.: NEGATIVE — SIGNIFICANT CHANGE UP
NIGHT BLUE STAIN TISS: SIGNIFICANT CHANGE UP
NRBC # BLD: 0 /100 WBCS — SIGNIFICANT CHANGE UP (ref 0–0)
PHOSPHATE SERPL-MCNC: 5.2 MG/DL — HIGH (ref 2.1–4.9)
PLATELET # BLD AUTO: 212 K/UL — SIGNIFICANT CHANGE UP (ref 130–400)
PMV BLD: 10.2 FL — SIGNIFICANT CHANGE UP (ref 7.4–10.4)
POTASSIUM SERPL-MCNC: 3.5 MMOL/L — SIGNIFICANT CHANGE UP (ref 3.5–5)
POTASSIUM SERPL-SCNC: 3.5 MMOL/L — SIGNIFICANT CHANGE UP (ref 3.5–5)
PROT SERPL-MCNC: 5.3 G/DL — LOW (ref 6–8)
RAPID RVP RESULT: DETECTED
RBC # BLD: 2.73 M/UL — LOW (ref 4.7–6.1)
RBC # FLD: 13.6 % — SIGNIFICANT CHANGE UP (ref 11.5–14.5)
RV+EV RNA SPEC QL NAA+PROBE: DETECTED
SARS-COV-2 RNA SPEC QL NAA+PROBE: SIGNIFICANT CHANGE UP
SODIUM SERPL-SCNC: 135 MMOL/L — SIGNIFICANT CHANGE UP (ref 135–146)
SPECIMEN SOURCE: SIGNIFICANT CHANGE UP
TROPONIN T SERPL-MCNC: 0.15 NG/ML — CRITICAL HIGH
TROPONIN T SERPL-MCNC: 0.17 NG/ML — CRITICAL HIGH
WBC # BLD: 5.62 K/UL — SIGNIFICANT CHANGE UP (ref 4.8–10.8)
WBC # FLD AUTO: 5.62 K/UL — SIGNIFICANT CHANGE UP (ref 4.8–10.8)

## 2023-09-10 PROCEDURE — 71045 X-RAY EXAM CHEST 1 VIEW: CPT | Mod: 26

## 2023-09-10 PROCEDURE — 99223 1ST HOSP IP/OBS HIGH 75: CPT

## 2023-09-10 RX ORDER — CALCIUM GLUCONATE 100 MG/ML
1 VIAL (ML) INTRAVENOUS ONCE
Refills: 0 | Status: COMPLETED | OUTPATIENT
Start: 2023-09-10 | End: 2023-09-10

## 2023-09-10 RX ORDER — INFLUENZA VIRUS VACCINE 15; 15; 15; 15 UG/.5ML; UG/.5ML; UG/.5ML; UG/.5ML
0.7 SUSPENSION INTRAMUSCULAR ONCE
Refills: 0 | Status: DISCONTINUED | OUTPATIENT
Start: 2023-09-10 | End: 2023-09-13

## 2023-09-10 RX ADMIN — Medication 3 MILLIGRAM(S): at 21:57

## 2023-09-10 RX ADMIN — BUDESONIDE AND FORMOTEROL FUMARATE DIHYDRATE 2 PUFF(S): 160; 4.5 AEROSOL RESPIRATORY (INHALATION) at 11:47

## 2023-09-10 RX ADMIN — HEPARIN SODIUM 5000 UNIT(S): 5000 INJECTION INTRAVENOUS; SUBCUTANEOUS at 05:34

## 2023-09-10 RX ADMIN — Medication 2: at 21:56

## 2023-09-10 RX ADMIN — Medication 81 MILLIGRAM(S): at 11:47

## 2023-09-10 RX ADMIN — Medication 2: at 16:50

## 2023-09-10 RX ADMIN — CHLORHEXIDINE GLUCONATE 1 APPLICATION(S): 213 SOLUTION TOPICAL at 05:34

## 2023-09-10 RX ADMIN — Medication 40 MILLIGRAM(S): at 05:29

## 2023-09-10 RX ADMIN — BUDESONIDE AND FORMOTEROL FUMARATE DIHYDRATE 2 PUFF(S): 160; 4.5 AEROSOL RESPIRATORY (INHALATION) at 19:14

## 2023-09-10 RX ADMIN — Medication 166.67 MILLIGRAM(S): at 05:30

## 2023-09-10 RX ADMIN — HEPARIN SODIUM 5000 UNIT(S): 5000 INJECTION INTRAVENOUS; SUBCUTANEOUS at 17:05

## 2023-09-10 RX ADMIN — CEFEPIME 100 MILLIGRAM(S): 1 INJECTION, POWDER, FOR SOLUTION INTRAMUSCULAR; INTRAVENOUS at 17:05

## 2023-09-10 RX ADMIN — CEFEPIME 100 MILLIGRAM(S): 1 INJECTION, POWDER, FOR SOLUTION INTRAMUSCULAR; INTRAVENOUS at 05:30

## 2023-09-10 RX ADMIN — Medication 600 MILLIGRAM(S): at 21:57

## 2023-09-10 RX ADMIN — Medication 650 MILLIGRAM(S): at 01:14

## 2023-09-10 RX ADMIN — Medication 40 MILLIGRAM(S): at 15:22

## 2023-09-10 RX ADMIN — BUDESONIDE AND FORMOTEROL FUMARATE DIHYDRATE 2 PUFF(S): 160; 4.5 AEROSOL RESPIRATORY (INHALATION) at 01:10

## 2023-09-10 RX ADMIN — Medication 100 GRAM(S): at 01:10

## 2023-09-10 RX ADMIN — Medication 600 MILLIGRAM(S): at 05:29

## 2023-09-10 RX ADMIN — Medication 3 MILLILITER(S): at 21:06

## 2023-09-10 RX ADMIN — SIMVASTATIN 40 MILLIGRAM(S): 20 TABLET, FILM COATED ORAL at 21:57

## 2023-09-11 LAB
A1C WITH ESTIMATED AVERAGE GLUCOSE RESULT: 5.4 % — SIGNIFICANT CHANGE UP (ref 4–5.6)
ALBUMIN SERPL ELPH-MCNC: 2.6 G/DL — LOW (ref 3.5–5.2)
ALP SERPL-CCNC: 100 U/L — SIGNIFICANT CHANGE UP (ref 30–115)
ALT FLD-CCNC: 15 U/L — SIGNIFICANT CHANGE UP (ref 0–41)
ANION GAP SERPL CALC-SCNC: 15 MMOL/L — HIGH (ref 7–14)
AST SERPL-CCNC: 45 U/L — HIGH (ref 0–41)
BILIRUB SERPL-MCNC: <0.2 MG/DL — SIGNIFICANT CHANGE UP (ref 0.2–1.2)
BUN SERPL-MCNC: 43 MG/DL — HIGH (ref 10–20)
CALCIUM SERPL-MCNC: 7.8 MG/DL — LOW (ref 8.4–10.5)
CHLORIDE SERPL-SCNC: 100 MMOL/L — SIGNIFICANT CHANGE UP (ref 98–110)
CO2 SERPL-SCNC: 22 MMOL/L — SIGNIFICANT CHANGE UP (ref 17–32)
CREAT SERPL-MCNC: 2.5 MG/DL — HIGH (ref 0.7–1.5)
EGFR: 26 ML/MIN/1.73M2 — LOW
ESTIMATED AVERAGE GLUCOSE: 108 MG/DL — SIGNIFICANT CHANGE UP (ref 68–114)
GLUCOSE BLDC GLUCOMTR-MCNC: 148 MG/DL — HIGH (ref 70–99)
GLUCOSE BLDC GLUCOMTR-MCNC: 272 MG/DL — HIGH (ref 70–99)
GLUCOSE BLDC GLUCOMTR-MCNC: 313 MG/DL — HIGH (ref 70–99)
GLUCOSE BLDC GLUCOMTR-MCNC: 341 MG/DL — HIGH (ref 70–99)
GLUCOSE SERPL-MCNC: 112 MG/DL — HIGH (ref 70–99)
HCT VFR BLD CALC: 22.3 % — LOW (ref 42–52)
HGB BLD-MCNC: 7.1 G/DL — LOW (ref 14–18)
LEGIONELLA AG UR QL: NEGATIVE — SIGNIFICANT CHANGE UP
MAGNESIUM SERPL-MCNC: 1.9 MG/DL — SIGNIFICANT CHANGE UP (ref 1.8–2.4)
MCHC RBC-ENTMCNC: 26.9 PG — LOW (ref 27–31)
MCHC RBC-ENTMCNC: 31.8 G/DL — LOW (ref 32–37)
MCV RBC AUTO: 84.5 FL — SIGNIFICANT CHANGE UP (ref 80–94)
NRBC # BLD: 0 /100 WBCS — SIGNIFICANT CHANGE UP (ref 0–0)
PLATELET # BLD AUTO: 222 K/UL — SIGNIFICANT CHANGE UP (ref 130–400)
PMV BLD: 10.2 FL — SIGNIFICANT CHANGE UP (ref 7.4–10.4)
POTASSIUM SERPL-MCNC: 3.7 MMOL/L — SIGNIFICANT CHANGE UP (ref 3.5–5)
POTASSIUM SERPL-SCNC: 3.7 MMOL/L — SIGNIFICANT CHANGE UP (ref 3.5–5)
PROCALCITONIN SERPL-MCNC: 0.17 NG/ML — HIGH (ref 0.02–0.1)
PROT SERPL-MCNC: 5.2 G/DL — LOW (ref 6–8)
RBC # BLD: 2.64 M/UL — LOW (ref 4.7–6.1)
RBC # FLD: 13.4 % — SIGNIFICANT CHANGE UP (ref 11.5–14.5)
SODIUM SERPL-SCNC: 137 MMOL/L — SIGNIFICANT CHANGE UP (ref 135–146)
WBC # BLD: 5.25 K/UL — SIGNIFICANT CHANGE UP (ref 4.8–10.8)
WBC # FLD AUTO: 5.25 K/UL — SIGNIFICANT CHANGE UP (ref 4.8–10.8)

## 2023-09-11 PROCEDURE — 93306 TTE W/DOPPLER COMPLETE: CPT | Mod: 26

## 2023-09-11 PROCEDURE — 93970 EXTREMITY STUDY: CPT | Mod: 26

## 2023-09-11 PROCEDURE — 76770 US EXAM ABDO BACK WALL COMP: CPT | Mod: 26

## 2023-09-11 PROCEDURE — 99233 SBSQ HOSP IP/OBS HIGH 50: CPT

## 2023-09-11 PROCEDURE — 99222 1ST HOSP IP/OBS MODERATE 55: CPT

## 2023-09-11 RX ADMIN — Medication 4: at 17:52

## 2023-09-11 RX ADMIN — HEPARIN SODIUM 5000 UNIT(S): 5000 INJECTION INTRAVENOUS; SUBCUTANEOUS at 17:44

## 2023-09-11 RX ADMIN — SIMVASTATIN 40 MILLIGRAM(S): 20 TABLET, FILM COATED ORAL at 21:41

## 2023-09-11 RX ADMIN — CEFEPIME 100 MILLIGRAM(S): 1 INJECTION, POWDER, FOR SOLUTION INTRAMUSCULAR; INTRAVENOUS at 06:16

## 2023-09-11 RX ADMIN — HEPARIN SODIUM 5000 UNIT(S): 5000 INJECTION INTRAVENOUS; SUBCUTANEOUS at 06:15

## 2023-09-11 RX ADMIN — Medication 3 MILLILITER(S): at 19:44

## 2023-09-11 RX ADMIN — Medication 81 MILLIGRAM(S): at 11:49

## 2023-09-11 RX ADMIN — CEFEPIME 100 MILLIGRAM(S): 1 INJECTION, POWDER, FOR SOLUTION INTRAMUSCULAR; INTRAVENOUS at 17:45

## 2023-09-11 RX ADMIN — Medication 40 MILLIGRAM(S): at 06:15

## 2023-09-11 RX ADMIN — Medication 3 MILLILITER(S): at 10:11

## 2023-09-11 RX ADMIN — Medication 2: at 21:45

## 2023-09-11 RX ADMIN — Medication 3: at 12:03

## 2023-09-11 RX ADMIN — Medication 166.67 MILLIGRAM(S): at 06:16

## 2023-09-11 RX ADMIN — BUDESONIDE AND FORMOTEROL FUMARATE DIHYDRATE 2 PUFF(S): 160; 4.5 AEROSOL RESPIRATORY (INHALATION) at 21:41

## 2023-09-11 NOTE — PHYSICAL THERAPY INITIAL EVALUATION ADULT - DIAGNOSIS, PT EVAL
Cecilia Zuletaindira is here for Initial evaluation of potential COVID-19 exposure.     You will need to schedule a lab appointment to be tested for COVID-19.  Your lab appointment was scheduled during today's call.  You are to remain off work and out of public places except to seek medical care, and complete the symptom tracker daily on [employer].  YOU MAY NOT RETURN TO WORK WITHOUT CLEARANCE FROM EMPLOYEE HEALTH.    Off work start date: 04/28/20           TM has not been exposed to any CoVid positive patients that she is aware of. Initially thought symptoms were allergy related. Symptoms worsened yesterday.    Communication sent to TM leader.  
Rehab of Deconditioning , pneumonia

## 2023-09-11 NOTE — CONSULT NOTE ADULT - SUBJECTIVE AND OBJECTIVE BOX
Date of Admission: 23    CHIEF COMPLAINT:    HISTORY OF PRESENT ILLNESS:   Asya speaking 73 y/o man w PMHx of HTN, HLD, NIDDM, HFpEF w G1DD, CKD 3a w Cr 1.6, h/o latent TB s/p treatment ~2023, follicular lymphoma of stomach dx 2018 now follows Dr Reese Tirado @ [facility] [phone], on Rituximab since 23 w last dose 23 which pt tolerated well, and Revlimid which pt started 23 and last dose 23, as pt developed cough and epistaxis and was told by Dr Tirado to d/c Revlimid for now, has had SOB since 23 and productive cough since 23, had outpatient CXR w Dr Tirado which was reportedly unremarkable but when seen by PCP earlier today was told to go to ED for treatment of PNA, in ED found to have RLL opacity and given azithromycin/CTX, Tmax 99.5, WBC 5.98 w neutrophils 4.27, trop 0.19 and BNP 2123, Cr 2.5, admitted to medicine for PNA though likely also has HF exacerbation and needs ACS r/o.   HCP wife Mili Alonzo [phone]  Daughter Oksana Guzman [phone]    Pt reports had been tolerating Rituximab well but after starting Revlimib has been feeling unwell- including SOB, cough, nosebleeds of total 4-5 episodes. Pt and wife have not noted worsening LE edema, but notes has been chronic x1year - recent admission notable for MAXX and LE edema which required Lasix 60mg IV w eventual transition to 40mg PO QD, echo showing G1DD. Has stopped Revlimid w last dose 23 but no other changes in medications. No known sick contacts. No CP, n/v/d, abd pain. No urinary symptoms. No hematemesis, hematochezia, melena.     Triage vitals were: 139/63, HR 85bpm, RR 18 w 96% on RA, 99.5F  Repeat vitals notable for: 166/72, RR18 w 98% on 2L NC  Labs notable for: WBC 5.98 w neutrophils 4.27, trop 0.19 and BNP 2123  Imaging: CXR w RLL opacity   Treatments: Azithromycin/CTX   (09 Sep 2023 22:29)      PAST MEDICAL & SURGICAL HISTORY:  Diabetes mellitus      Hyperlipidemia      Lymphoma      HTN (hypertension)      Iron deficiency anemia      Treatment completed for tuberculosis      Chronic heart failure with preserved ejection fraction      Stage 3 chronic kidney disease      No significant past surgical history          HEALTH ISSUES - PROBLEM Dx:    FAMILY HISTORY:    None [ ]  Mother:   Father:   Siblings:     SOCIAL HISTORY:    [ ] Non-smoker  [ ] Smoker  [ ] Alcohol    Allergies    No Known Allergies    Intolerances    REVIEW OF SYMPTOMS:  CONSTITUTIONAL: No weakness, fevers or chills; No headaches  EYES: No visual changes, eye pain, or discharge  ENT: No vertigo; No ear pain or change in hearing; No sore throat or difficulty swallowing  NECK: No pain or stiffness  RESPIRATORY: No cough, wheezing, or hemoptysis; No shortness of breath  CARDIOVASCULAR: No chest pain or palpitations  GASTROINTESTINAL: No abdominal or epigastric pain; No nausea, vomiting, or hematemesis; No diarrhea or constipation; No melena or hematochezia  GENITOURINARY: No dysuria, frequency or hematuria  MUSCULOSKELETAL: No joint pain, no muscle pain, no weakness  NEUROLOGICAL: No numbness or weakness  SKIN: No itching or rashes      VITALS:  T(C): 36.8 (09-10-23 @ 20:05), Max: 36.8 (09-10-23 @ 20:05)  HR: 85 (09-10-23 @ 20:05) (79 - 85)  BP: 159/69 (09-10-23 @ 20:05) (159/69 - 163/72)  RR: 18 (23 @ 08:32) (18 - 20)  SpO2: 98% (23 @ 08:32) (98% - 98%)  Wt(kg): --  I&O's Summary    10 Sep 2023 07:01  -  11 Sep 2023 07:00  --------------------------------------------------------  IN: 490 mL / OUT: 2585 mL / NET: -2095 mL    11 Sep 2023 07:01  -  11 Sep 2023 10:09  --------------------------------------------------------  IN: 210 mL / OUT: 0 mL / NET: 210 mL      Daily     Daily Weight in k.5 (10 Sep 2023 12:05)    PHYSICAL EXAM:  GEN: Not in acute distress  HEENT: EOMI  LUNGS: Dec BS   CARDIOVASCULAR: RRR, S1/S2 present, no murmurs, rubs or gallops, no JVD  ABD: Soft, non-tender, non-distended  EXT: No FRANSISCA  SKIN: Intact  NEURO: AAOx3    LABS:	 	                          7.1    5.25  )-----------( 222      ( 11 Sep 2023 08:30 )             22.3         137  |  100  |  43<H>  ----------------------------<  112<H>  3.7   |  22  |  2.5<H>    Ca    7.8<L>      11 Sep 2023 08:30  Phos  5.2     09-10  Mg     1.9         TPro  5.2<L>  /  Alb  2.6<L>  /  TBili  <0.2  /  DBili  x   /  AST  45<H>  /  ALT  15  /  AlkPhos  100  09-11    CARDIAC MARKERS ( 10 Sep 2023 04:30 )  x     / 0.15 ng/mL / x     / x     / x      CARDIAC MARKERS ( 10 Sep 2023 00:37 )  x     / 0.17 ng/mL / x     / x     / x      CARDIAC MARKERS ( 09 Sep 2023 13:53 )  x     / 0.19 ng/mL / x     / x     / x          CARDIAC MARKERS:  Troponin trend: 0.19  (09 Sep 2023 13:53), 0.17  (10 Sep 2023 00:37), 0.15  (10 Sep 2023 04:30)    TELEMETRY EVENTS:      ECG:  < from: 12 Lead ECG (23 @ 14:14) >    Diagnosis Line Normal sinus rhythm  Nonspecific T wave abnormality  Abnormal ECG    < end of copied text >    RADIOLOGY:  < from: Xray Chest 1 View- PORTABLE-Routine (Xray Chest 1 View- PORTABLE-Routine in AM.) (09.10.23 @ 07:46) >  IMPRESSION:    Stable to minimally increased right basal pleural-parenchymal opacity. No   pneumothorax.    Stable cardiomediastinal silhouette.    Unchanged osseous structures.    < end of copied text >    OTHER:    Echocardiogram:  < from: TTE Echo Complete w/o Contrast w/ Doppler (23 @ 13:27) >   1. LV Ejection Fraction by Bello's Method with a biplane EF of 59 %.   2. Normal global left ventricular systolic function.   3. Spectral Doppler shows impaired relaxation pattern of left   ventricular myocardial filling (Grade I diastolic dysfunction).   4. Moderately increased LV wall thickness.   5. Normal left atrial size.   6. Normal right atrial size.    < end of copied text >    Catheterization:    Stress Test:  	        Home Medications:  albuterol 90 mcg/inh inhalation aerosol: 1 puff(s) inhaled every 8 hours As needed Shortness of Breath and/or Wheezing (2023 10:42)  aspirin 81 mg oral tablet: 1 tab(s) orally once a day (26 May 2023 12:59)  Breztri Aerosphere 160 mcg-9 mcg-4.8 mcg/inh inhalation aerosol: 2 puff(s) inhaled once a day (09 Sep 2023 22:23)  Lasix 20 mg oral tablet: 1 tab(s) orally once a day (09 Sep 2023 22:21)  losartan 25 mg oral tablet: 1 tab(s) orally once a day (09 Sep 2023 22:19)  pioglitazone 30 mg oral tablet: 1 tab(s) orally once a day (26 May 2023 12:59)  Synjardy 12.5 mg-1000 mg oral tablet: 1 tab(s) orally 2 times a day (26 May 2023 12:59)  Trulicity Pen 1.5 mg/0.5 mL subcutaneous solution:  (26 May 2023 12:59)    MEDICATIONS  (STANDING):  albuterol    90 MICROgram(s) HFA Inhaler 2 Puff(s) Inhalation every 6 hours  albuterol/ipratropium for Nebulization 3 milliLiter(s) Nebulizer every 6 hours  aspirin  chewable 81 milliGRAM(s) Oral daily  budesonide 160 MICROgram(s)/formoterol 4.5 MICROgram(s) Inhaler 2 Puff(s) Inhalation two times a day  cefepime   IVPB 2000 milliGRAM(s) IV Intermittent every 12 hours  chlorhexidine 2% Cloths 1 Application(s) Topical <User Schedule>  dextrose 5%. 1000 milliLiter(s) (100 mL/Hr) IV Continuous <Continuous>  dextrose 5%. 1000 milliLiter(s) (50 mL/Hr) IV Continuous <Continuous>  dextrose 50% Injectable 25 Gram(s) IV Push once  dextrose 50% Injectable 25 Gram(s) IV Push once  dextrose 50% Injectable 12.5 Gram(s) IV Push once  furosemide   Injectable 40 milliGRAM(s) IV Push daily  glucagon  Injectable 1 milliGRAM(s) IntraMuscular once  heparin   Injectable 5000 Unit(s) SubCutaneous every 12 hours  influenza  Vaccine (HIGH DOSE) 0.7 milliLiter(s) IntraMuscular once  insulin lispro (ADMELOG) corrective regimen sliding scale   SubCutaneous three times a day before meals  insulin lispro (ADMELOG) corrective regimen sliding scale   SubCutaneous at bedtime  methylPREDNISolone sodium succinate Injectable 40 milliGRAM(s) IV Push every 12 hours  simvastatin 40 milliGRAM(s) Oral at bedtime    MEDICATIONS  (PRN):  acetaminophen     Tablet .. 650 milliGRAM(s) Oral every 6 hours PRN Temp greater or equal to 38C (100.4F), Mild Pain (1 - 3), Moderate Pain (4 - 6)  dextrose Oral Gel 15 Gram(s) Oral once PRN Blood Glucose LESS THAN 70 milliGRAM(s)/deciliter  guaiFENesin  milliGRAM(s) Oral every 12 hours PRN Cough  melatonin 3 milliGRAM(s) Oral at bedtime PRN Insomnia         Date of Admission: 23    CHIEF COMPLAINT: SOB    HISTORY OF PRESENT ILLNESS:   Asya speaking 71 y/o man w PMHx of HTN, HLD, NIDDM, HFpEF w G1DD, CKD 3a w Cr 1.6, h/o latent TB s/p treatment ~2023, follicular lymphoma of stomach dx 2018 now follows Dr Reese Tirado @ [facility] [phone], on Rituximab since 23 w last dose 23 which pt tolerated well, and Revlimid which pt started 23 and last dose 23, as pt developed cough and epistaxis and was told by Dr Tirado to d/c Revlimid for now, has had SOB since 23 and productive cough since 23, had outpatient CXR w Dr Tirado which was reportedly unremarkable but when seen by PCP earlier today was told to go to ED for treatment of PNA, in ED found to have RLL opacity and given azithromycin/CTX, Tmax 99.5, WBC 5.98 w neutrophils 4.27, trop 0.19 and BNP 2123, Cr 2.5, admitted to medicine for PNA though likely also has HF exacerbation and needs ACS r/o.   HCP wife Mili Alonzo [phone]  Daughter Oksana Guzman [phone]    Pt reports had been tolerating Rituximab well but after starting Revlimib has been feeling unwell- including SOB, cough, nosebleeds of total 4-5 episodes. Pt and wife have not noted worsening LE edema, but notes has been chronic x1year - recent admission notable for MAXX and LE edema which required Lasix 60mg IV w eventual transition to 40mg PO QD, echo showing G1DD. Has stopped Revlimid w last dose 23 but no other changes in medications. No known sick contacts. No CP, n/v/d, abd pain. No urinary symptoms. No hematemesis, hematochezia, melena.     Triage vitals were: 139/63, HR 85bpm, RR 18 w 96% on RA, 99.5F  Repeat vitals notable for: 166/72, RR18 w 98% on 2L NC  Labs notable for: WBC 5.98 w neutrophils 4.27, trop 0.19 and BNP 2123  Imaging: CXR w RLL opacity   Treatments: Azithromycin/CTX   (09 Sep 2023 22:29)    Cardio fellow additional Notes: Patient uses a walker to ambulate at baseline. Never had any ischemic workup done. Unable to achieve more than 4 METS       PAST MEDICAL & SURGICAL HISTORY:  Diabetes mellitus      Hyperlipidemia      Lymphoma      HTN (hypertension)      Iron deficiency anemia      Treatment completed for tuberculosis      Chronic heart failure with preserved ejection fraction      Stage 3 chronic kidney disease      No significant past surgical history          HEALTH ISSUES - PROBLEM Dx:    FAMILY HISTORY:    None [X ]  Mother:   Father:   Siblings:     SOCIAL HISTORY:    [ ] Non-smoker  [X ]Ex Smoker- 1 PPD 16 years   [ ] Alcohol    Allergies    No Known Allergies    Intolerances    REVIEW OF SYMPTOMS:  CONSTITUTIONAL: Weakness +ve  EYES: No visual changes, eye pain, or discharge  ENT: No vertigo; No ear pain or change in hearing; No sore throat or difficulty swallowing  NECK: No pain or stiffness  RESPIRATORY: Shortness of breath +ve  CARDIOVASCULAR: No chest pain or palpitations  GASTROINTESTINAL: No abdominal or epigastric pain; No nausea, vomiting, or hematemesis; No diarrhea or constipation; No melena or hematochezia  GENITOURINARY: No dysuria, frequency or hematuria  MUSCULOSKELETAL: No joint pain, no muscle pain, no weakness  SKIN: No itching or rashes      VITALS:  T(C): 36.8 (09-10-23 @ 20:05), Max: 36.8 (09-10-23 @ 20:05)  HR: 85 (09-10-23 @ 20:05) (79 - 85)  BP: 159/69 (09-10-23 @ 20:05) (159/69 - 163/72)  RR: 18 (23 @ 08:32) (18 - 20)  SpO2: 98% (23 @ 08:32) (98% - 98%)  Wt(kg): --  I&O's Summary    10 Sep 2023 07:  -  11 Sep 2023 07:00  --------------------------------------------------------  IN: 490 mL / OUT: 2585 mL / NET: -2095 mL    11 Sep 2023 07:  -  11 Sep 2023 10:09  --------------------------------------------------------  IN: 210 mL / OUT: 0 mL / NET: 210 mL      Daily     Daily Weight in k.5 (10 Sep 2023 12:05)    PHYSICAL EXAM:  GEN: Not in acute distress  HEENT: EOMI  LUNGS: Crackles b/l   CARDIOVASCULAR: RRR, S1/S2 present  ABD: Soft, non-tender, non-distended  EXT: 1+ FRANSISCA  SKIN: Intact  NEURO: AAOx3    LABS:	 	                          7.1    5.25  )-----------( 222      ( 11 Sep 2023 08:30 )             22.3     -    137  |  100  |  43<H>  ----------------------------<  112<H>  3.7   |  22  |  2.5<H>    Ca    7.8<L>      11 Sep 2023 08:30  Phos  5.2     10  Mg     1.9     11    TPro  5.2<L>  /  Alb  2.6<L>  /  TBili  <0.2  /  DBili  x   /  AST  45<H>  /  ALT  15  /  AlkPhos  100  09-11    CARDIAC MARKERS ( 10 Sep 2023 04:30 )  x     / 0.15 ng/mL / x     / x     / x      CARDIAC MARKERS ( 10 Sep 2023 00:37 )  x     / 0.17 ng/mL / x     / x     / x      CARDIAC MARKERS ( 09 Sep 2023 13:53 )  x     / 0.19 ng/mL / x     / x     / x          CARDIAC MARKERS:  Troponin trend: 0.19  (09 Sep 2023 13:53), 0.17  (10 Sep 2023 00:37), 0.15  (10 Sep 2023 04:30)    TELEMETRY EVENTS:  None  ECG:  < from: 12 Lead ECG (23 @ 14:14) >    Diagnosis Line Normal sinus rhythm  Nonspecific T wave abnormality  Abnormal ECG    < end of copied text >    RADIOLOGY:  < from: Xray Chest 1 View- PORTABLE-Routine (Xray Chest 1 View- PORTABLE-Routine in AM.) (09.10.23 @ 07:46) >  IMPRESSION:    Stable to minimally increased right basal pleural-parenchymal opacity. No   pneumothorax.    Stable cardiomediastinal silhouette.    Unchanged osseous structures.    < end of copied text >    OTHER:    Echocardiogram:  < from: TTE Echo Complete w/o Contrast w/ Doppler (23 @ 13:27) >   1. LV Ejection Fraction by Bello's Method with a biplane EF of 59 %.   2. Normal global left ventricular systolic function.   3. Spectral Doppler shows impaired relaxation pattern of left   ventricular myocardial filling (Grade I diastolic dysfunction).   4. Moderately increased LV wall thickness.   5. Normal left atrial size.   6. Normal right atrial size.    < end of copied text >    Catheterization:    Stress Test:  	        Home Medications:  albuterol 90 mcg/inh inhalation aerosol: 1 puff(s) inhaled every 8 hours As needed Shortness of Breath and/or Wheezing (2023 10:42)  aspirin 81 mg oral tablet: 1 tab(s) orally once a day (26 May 2023 12:59)  Breztri Aerosphere 160 mcg-9 mcg-4.8 mcg/inh inhalation aerosol: 2 puff(s) inhaled once a day (09 Sep 2023 22:23)  Lasix 20 mg oral tablet: 1 tab(s) orally once a day (09 Sep 2023 22:21)  losartan 25 mg oral tablet: 1 tab(s) orally once a day (09 Sep 2023 22:19)  pioglitazone 30 mg oral tablet: 1 tab(s) orally once a day (26 May 2023 12:59)  Synjardy 12.5 mg-1000 mg oral tablet: 1 tab(s) orally 2 times a day (26 May 2023 12:59)  Trulicity Pen 1.5 mg/0.5 mL subcutaneous solution:  (26 May 2023 12:59)    MEDICATIONS  (STANDING):  albuterol    90 MICROgram(s) HFA Inhaler 2 Puff(s) Inhalation every 6 hours  albuterol/ipratropium for Nebulization 3 milliLiter(s) Nebulizer every 6 hours  aspirin  chewable 81 milliGRAM(s) Oral daily  budesonide 160 MICROgram(s)/formoterol 4.5 MICROgram(s) Inhaler 2 Puff(s) Inhalation two times a day  cefepime   IVPB 2000 milliGRAM(s) IV Intermittent every 12 hours  chlorhexidine 2% Cloths 1 Application(s) Topical <User Schedule>  dextrose 5%. 1000 milliLiter(s) (100 mL/Hr) IV Continuous <Continuous>  dextrose 5%. 1000 milliLiter(s) (50 mL/Hr) IV Continuous <Continuous>  dextrose 50% Injectable 25 Gram(s) IV Push once  dextrose 50% Injectable 25 Gram(s) IV Push once  dextrose 50% Injectable 12.5 Gram(s) IV Push once  furosemide   Injectable 40 milliGRAM(s) IV Push daily  glucagon  Injectable 1 milliGRAM(s) IntraMuscular once  heparin   Injectable 5000 Unit(s) SubCutaneous every 12 hours  influenza  Vaccine (HIGH DOSE) 0.7 milliLiter(s) IntraMuscular once  insulin lispro (ADMELOG) corrective regimen sliding scale   SubCutaneous three times a day before meals  insulin lispro (ADMELOG) corrective regimen sliding scale   SubCutaneous at bedtime  methylPREDNISolone sodium succinate Injectable 40 milliGRAM(s) IV Push every 12 hours  simvastatin 40 milliGRAM(s) Oral at bedtime    MEDICATIONS  (PRN):  acetaminophen     Tablet .. 650 milliGRAM(s) Oral every 6 hours PRN Temp greater or equal to 38C (100.4F), Mild Pain (1 - 3), Moderate Pain (4 - 6)  dextrose Oral Gel 15 Gram(s) Oral once PRN Blood Glucose LESS THAN 70 milliGRAM(s)/deciliter  guaiFENesin  milliGRAM(s) Oral every 12 hours PRN Cough  melatonin 3 milliGRAM(s) Oral at bedtime PRN Insomnia         Date of Admission: 23    CHIEF COMPLAINT: SOB    HISTORY OF PRESENT ILLNESS:   Asya speaking 73 y/o man w PMHx of HTN, HLD, NIDDM, HFpEF w G1DD, CKD 3a w Cr 1.6, h/o latent TB s/p treatment ~2023, follicular lymphoma of stomach dx 2018 now follows Dr Reese Tirado @ [facility] [phone], on Rituximab since 23 w last dose 23 which pt tolerated well, and Revlimid which pt started 23 and last dose 23, as pt developed cough and epistaxis and was told by Dr Tirado to d/c Revlimid for now, has had SOB since 23 and productive cough since 23, had outpatient CXR w Dr Tirado which was reportedly unremarkable but when seen by PCP earlier today was told to go to ED for treatment of PNA, in ED found to have RLL opacity and given azithromycin/CTX, Tmax 99.5, WBC 5.98 w neutrophils 4.27, trop 0.19 and BNP 2123, Cr 2.5, admitted to medicine for PNA though likely also has HF exacerbation and needs ACS r/o.   HCP wife Mili Alonzo [phone]  Daughter Oksana Guzman [phone]    Pt reports had been tolerating Rituximab well but after starting Revlimib has been feeling unwell- including SOB, cough, nosebleeds of total 4-5 episodes. Pt and wife have not noted worsening LE edema, but notes has been chronic x1year - recent admission notable for MAXX and LE edema which required Lasix 60mg IV w eventual transition to 40mg PO QD, echo showing G1DD. Has stopped Revlimid w last dose 23 but no other changes in medications. No known sick contacts. No CP, n/v/d, abd pain. No urinary symptoms. No hematemesis, hematochezia, melena.     Triage vitals were: 139/63, HR 85bpm, RR 18 w 96% on RA, 99.5F  Repeat vitals notable for: 166/72, RR18 w 98% on 2L NC  Labs notable for: WBC 5.98 w neutrophils 4.27, trop 0.19 and BNP 2123  Imaging: CXR w RLL opacity   Treatments: Azithromycin/CTX   (09 Sep 2023 22:29)    Cardio fellow additional Notes: Patient uses a walker to ambulate at baseline. Never had any ischemic workup done. Unable to achieve more than 4 METS       PAST MEDICAL & SURGICAL HISTORY:  Diabetes mellitus      Hyperlipidemia      Lymphoma      HTN (hypertension)      Iron deficiency anemia      Treatment completed for tuberculosis      Chronic heart failure with preserved ejection fraction      Stage 3 chronic kidney disease      No significant past surgical history          HEALTH ISSUES - PROBLEM Dx:    FAMILY HISTORY:    None [X ]  Mother:   Father:   Siblings:     SOCIAL HISTORY:    [ ] Non-smoker  [X ]Ex Smoker- 1 PPD 16 years   [ ] Alcohol    Allergies    No Known Allergies    Intolerances    REVIEW OF SYMPTOMS:  CONSTITUTIONAL: Weakness +ve  EYES: No visual changes, eye pain, or discharge  ENT: No vertigo; No ear pain or change in hearing; No sore throat or difficulty swallowing  NECK: No pain or stiffness  RESPIRATORY: Shortness of breath +ve  CARDIOVASCULAR: No chest pain or palpitations  GASTROINTESTINAL: No abdominal or epigastric pain; No nausea, vomiting, or hematemesis; No diarrhea or constipation; No melena or hematochezia  GENITOURINARY: No dysuria, frequency or hematuria  MUSCULOSKELETAL: No joint pain, no muscle pain, no weakness  SKIN: No itching or rashes  10 point ROS completed; negative except as stated in HPI      VITALS:  T(C): 36.8 (09-10-23 @ 20:05), Max: 36.8 (09-10-23 @ 20:05)  HR: 85 (09-10-23 @ 20:05) (79 - 85)  BP: 159/69 (09-10-23 @ 20:05) (159/69 - 163/72)  RR: 18 (23 @ 08:32) (18 - 20)  SpO2: 98% (23 @ 08:32) (98% - 98%)  Wt(kg): --  I&O's Summary    10 Sep 2023 07:  -  11 Sep 2023 07:00  --------------------------------------------------------  IN: 490 mL / OUT: 2585 mL / NET: -2095 mL    11 Sep 2023 07:01  -  11 Sep 2023 10:09  --------------------------------------------------------  IN: 210 mL / OUT: 0 mL / NET: 210 mL      Daily     Daily Weight in k.5 (10 Sep 2023 12:05)    PHYSICAL EXAM:  GEN: Not in acute distress  HEENT: EOMI, PERRLA  Neck: supple, no JVD  LUNGS: Crackles b/l, no wheezes  CARDIOVASCULAR: RRR, S1/S2 present  ABD: Soft, non-tender, non-distended  EXT: 1+ FRANSISCA  SKIN: Intact  NEURO: AAOx3    LABS:	 	                          7.1    5.25  )-----------( 222      ( 11 Sep 2023 08:30 )             22.3     09-11    137  |  100  |  43<H>  ----------------------------<  112<H>  3.7   |  22  |  2.5<H>    Ca    7.8<L>      11 Sep 2023 08:30  Phos  5.2     09-10  Mg     1.9     09-11    TPro  5.2<L>  /  Alb  2.6<L>  /  TBili  <0.2  /  DBili  x   /  AST  45<H>  /  ALT  15  /  AlkPhos  100  09-11    CARDIAC MARKERS ( 10 Sep 2023 04:30 )  x     / 0.15 ng/mL / x     / x     / x      CARDIAC MARKERS ( 10 Sep 2023 00:37 )  x     / 0.17 ng/mL / x     / x     / x      CARDIAC MARKERS ( 09 Sep 2023 13:53 )  x     / 0.19 ng/mL / x     / x     / x          CARDIAC MARKERS:  Troponin trend: 0.19  (09 Sep 2023 13:53), 0.17  (10 Sep 2023 00:37), 0.15  (10 Sep 2023 04:30)    TELEMETRY EVENTS:  None  ECG:  < from: 12 Lead ECG (23 @ 14:14) >    Diagnosis Line Normal sinus rhythm  Nonspecific T wave abnormality  Abnormal ECG    < end of copied text >    RADIOLOGY:  < from: Xray Chest 1 View- PORTABLE-Routine (Xray Chest 1 View- PORTABLE-Routine in AM.) (09.10.23 @ 07:46) >  IMPRESSION:    Stable to minimally increased right basal pleural-parenchymal opacity. No   pneumothorax.    Stable cardiomediastinal silhouette.    Unchanged osseous structures.    < end of copied text >    OTHER:    Echocardiogram:  < from: TTE Echo Complete w/o Contrast w/ Doppler (23 @ 13:27) >   1. LV Ejection Fraction by Bello's Method with a biplane EF of 59 %.   2. Normal global left ventricular systolic function.   3. Spectral Doppler shows impaired relaxation pattern of left   ventricular myocardial filling (Grade I diastolic dysfunction).   4. Moderately increased LV wall thickness.   5. Normal left atrial size.   6. Normal right atrial size.    < end of copied text >    Catheterization:    Stress Test:  	        Home Medications:  albuterol 90 mcg/inh inhalation aerosol: 1 puff(s) inhaled every 8 hours As needed Shortness of Breath and/or Wheezing (2023 10:42)  aspirin 81 mg oral tablet: 1 tab(s) orally once a day (26 May 2023 12:59)  Breztri Aerosphere 160 mcg-9 mcg-4.8 mcg/inh inhalation aerosol: 2 puff(s) inhaled once a day (09 Sep 2023 22:23)  Lasix 20 mg oral tablet: 1 tab(s) orally once a day (09 Sep 2023 22:21)  losartan 25 mg oral tablet: 1 tab(s) orally once a day (09 Sep 2023 22:19)  pioglitazone 30 mg oral tablet: 1 tab(s) orally once a day (26 May 2023 12:59)  Synjardy 12.5 mg-1000 mg oral tablet: 1 tab(s) orally 2 times a day (26 May 2023 12:59)  Trulicity Pen 1.5 mg/0.5 mL subcutaneous solution:  (26 May 2023 12:59)    MEDICATIONS  (STANDING):  albuterol    90 MICROgram(s) HFA Inhaler 2 Puff(s) Inhalation every 6 hours  albuterol/ipratropium for Nebulization 3 milliLiter(s) Nebulizer every 6 hours  aspirin  chewable 81 milliGRAM(s) Oral daily  budesonide 160 MICROgram(s)/formoterol 4.5 MICROgram(s) Inhaler 2 Puff(s) Inhalation two times a day  cefepime   IVPB 2000 milliGRAM(s) IV Intermittent every 12 hours  chlorhexidine 2% Cloths 1 Application(s) Topical <User Schedule>  dextrose 5%. 1000 milliLiter(s) (100 mL/Hr) IV Continuous <Continuous>  dextrose 5%. 1000 milliLiter(s) (50 mL/Hr) IV Continuous <Continuous>  dextrose 50% Injectable 25 Gram(s) IV Push once  dextrose 50% Injectable 25 Gram(s) IV Push once  dextrose 50% Injectable 12.5 Gram(s) IV Push once  furosemide   Injectable 40 milliGRAM(s) IV Push daily  glucagon  Injectable 1 milliGRAM(s) IntraMuscular once  heparin   Injectable 5000 Unit(s) SubCutaneous every 12 hours  influenza  Vaccine (HIGH DOSE) 0.7 milliLiter(s) IntraMuscular once  insulin lispro (ADMELOG) corrective regimen sliding scale   SubCutaneous three times a day before meals  insulin lispro (ADMELOG) corrective regimen sliding scale   SubCutaneous at bedtime  methylPREDNISolone sodium succinate Injectable 40 milliGRAM(s) IV Push every 12 hours  simvastatin 40 milliGRAM(s) Oral at bedtime    MEDICATIONS  (PRN):  acetaminophen     Tablet .. 650 milliGRAM(s) Oral every 6 hours PRN Temp greater or equal to 38C (100.4F), Mild Pain (1 - 3), Moderate Pain (4 - 6)  dextrose Oral Gel 15 Gram(s) Oral once PRN Blood Glucose LESS THAN 70 milliGRAM(s)/deciliter  guaiFENesin  milliGRAM(s) Oral every 12 hours PRN Cough  melatonin 3 milliGRAM(s) Oral at bedtime PRN Insomnia

## 2023-09-11 NOTE — CONSULT NOTE ADULT - ATTENDING COMMENTS
Briefly, 72 year old man for whom cardiology is consulted for elevated troponin, most likely type II MI in the setting of PNA and acute on chronic HFpEF. His echocardiogram was personally reviewed and EF is preserved without RWMA. He does not need any further workup form a cardiology stand point inpatient. He can see me in the office outpatient for further management. Discussed this with the patient and his wife and they agreed.     Thank you for this consult. Please call/MS teams with questions. 1 person assist/verbal cues

## 2023-09-11 NOTE — PROGRESS NOTE ADULT - ASSESSMENT
73 y/o man w PMHx of HTN, HLD, NIDDM, HFpEF w G1DD, CKD 3a w Cr 1.6, h/o latent TB s/p treatment ~6/2023, follicular lymphoma of stomach dx 2018 now follows Dr Reese Tirado @ [facility] [phone], on Rituximab since 8/17/23 w last dose 9/7/23 which pt tolerated well, and Revlimid which pt started 9/1/23 and last dose 9/7/23, as pt developed cough and epistaxis and was told by Dr Tirado to d/c Revlimid for now, has had SOB since 9/1/23 and productive cough since 9/5/23     73 y/o man w PMHx of HTN, HLD, NIDDM, HFpEF w G1DD, CKD 3a w Cr 1.6, h/o latent TB s/p treatment ~6/2023, follicular lymphoma of stomach dx 2018 now follows Dr Reese Tirado @ [facility] [phone], on Rituximab since 8/17/23 w last dose 9/7/23 which pt tolerated well, and Revlimid which pt started 9/1/23 and last dose 9/7/23, as pt developed cough and epistaxis and was told by Dr Tirado to d/c Revlimid for now, has had SOB since 9/1/23 and productive cough since 9/5/23    RLL PNA  Acute HFpEF  DM-2 / HTN / DL  MAXX on CKD-3  Follicular Lymphoma of the stomach   Normocytic Anemia                PLAN:    ·	Tele reviewed. No events  ·	Troponin are elevated but trending down.   ·	Pro BNP is 2123  ·	Cardiology eval  ·	ECHO  ·	Blood cxs x 2 are negative.   ·	CXR showed stable to minimally increase basal pleural-parenchymal opacity  ·	Serum Procalcitonin is 0.17 which is negative. Pt likely has PNA even though procalcitonin is negative  ·	MRSA PCR is negative  ·	Check sputum cx, urine Legionella and Streptococcal Ag.   ·	Will cont IV Cefepime.   ·	MAXX: Renal/bladder US. Check PVR  ·	Monitor electrolytes and renal function  ·	Will cont Lasix 40 mg iv daily for now  ·	Check i's and o's and daily wt  ·	Low salt diet and water restriction to 1.5 L/D  ·	D/C Solumedrol  ·	Repeat CXR in AM  ·	Iron studies  ·	Monitor FS. On Insulin corrective regimen    Progress Note Handoff    Pending (specify):  Consults_Cardiology________, Tests________, Test Results_______, Other__Suspected PNA, AKI_______  Family discussion:  Disposition: Home___/SNF___/Other________/Unknown at this time________    Odin Ly MD  Spectra: [id number]

## 2023-09-11 NOTE — CONSULT NOTE ADULT - ASSESSMENT
73 YO M w PMHx of HTN, HLD, NIDDM, HFpEF w G1DD, CKD 3a (baseline Cr 1.6), h/o latent TB s/p treatment ~6/2023, follicular lymphoma of stomach (dx 2018 now follows Dr Reese Tirado @ [facility] [phone], on Rituximab since 8/17/23 w last dose 9/7/23) and Revlimid (which pt started 9/1/23 and last dose 9/7/23, as pt developed cough and epistaxis and was told by Dr Tirado to d/c Revlimid for now).   Patient had SOB since 9/1/23 and productive cough since 9/5/23, had outpatient CXR w Dr Tirado which was reportedly unremarkable but when seen by PCP earlier today was told to go to ED for treatment of PNA, in ED found to have RLL opacity and given azithromycin/CTX, Tmax 99.5, WBC 5.98 w neutrophils 4.27, trop 0.19 and BNP 2123, Cr 2.5, admitted to medicine for PNA though likely also has HF exacerbation and needs ACS r/o.     Cardiology consulted for elevated troponin    TTE 5/23/23: EF 59%. GIDD	    IMPRESSION:  - Elevated troponin in the setting of decompensated HFpEF + RLL PNA + MAXX on CKD; no active chest pain  - MAXX on CKD 3 **INCOMPLETE NOTE**    71 YO M w PMHx of HTN, HLD, NIDDM, HFpEF w G1DD, CKD 3a (baseline Cr 1.6), h/o latent TB s/p treatment ~6/2023, follicular lymphoma of stomach (dx 2018 now follows Dr Reese Tirado @ [facility] [phone], on Rituximab since 8/17/23 w last dose 9/7/23) and Revlimid (which pt started 9/1/23 and last dose 9/7/23, as pt developed cough and epistaxis and was told by Dr Tirado to d/c Revlimid for now). Patient presented for SOB after failing outpatient treatment for PNA    Cardiology consulted for elevated troponin    TTE 5/23/23: EF 59%. GIDD	    IMPRESSION:  - Elevated troponin in the setting of decompensated HFpEF + RLL PNA + MAXX on CKD; no active chest pain  - MAXX on CKD 3  - Former smoker; 1 PPD x 16 years; quit 4 years ago  - HTN/HLD  - DM type II; not on insulin   - Follicular lymphoma of stomach on Rituximab and Revlimid     PLAN:  - Elevated troponin likely type II MI.   - No further in patient workup from cardiology    Outpatient follow up with cardiology  73 YO M w PMHx of HTN, HLD, NIDDM, HFpEF w G1DD, CKD 3a (baseline Cr 1.6), h/o latent TB s/p treatment ~6/2023, follicular lymphoma of stomach (dx 2018 now follows Dr Reese Tirado @ [facility] [phone], on Rituximab since 8/17/23 w last dose 9/7/23) and Revlimid (which pt started 9/1/23 and last dose 9/7/23, as pt developed cough and epistaxis and was told by Dr Tirado to d/c Revlimid for now). Patient presented for SOB after failing outpatient treatment for PNA    Cardiology consulted for elevated troponin    TTE 5/23/23: EF 59%. GIDD	    IMPRESSION:  - Elevated troponin in the setting of decompensated HFpEF + RLL PNA + MAXX on CKD; no active chest pain  - MAXX on CKD 3  - Former smoker; 1 PPD x 16 years; quit 4 years ago  - HTN/HLD  - DM type II; not on insulin   - Follicular lymphoma of stomach on Rituximab and Revlimid     PLAN:  - Elevated troponin likely type II MI.   - No further in patient workup from cardiology    Outpatient follow up with cardiology

## 2023-09-11 NOTE — PHYSICAL THERAPY INITIAL EVALUATION ADULT - PERTINENT HX OF CURRENT PROBLEM, REHAB EVAL
71 y/o man w PMHx of HTN, HLD, NIDDM, HFpEF w G1DD, CKD 3a w Cr 1.6, h/o latent TB s/p treatment ~6/2023, follicular lymphoma of stomach dx 2018 now follows Dr Reese Tirado @ [facility] [phone], on Rituximab since 8/17/23 w last dose 9/7/23 which pt tolerated well, and Revlimid which pt started 9/1/23 and last dose 9/7/23, as pt developed cough and epistaxis and was told by Dr Tirado to d/c Revlimid for now, has had SOB since 9/1/23 and productive cough since 9/5/23, had outpatient CXR w Dr Tirado which was reportedly unremarkable but when seen by PCP earlier today was told to go to ED for treatment of PNA, in ED found to have RLL opacity and given azithromycin/CTX, Tmax 99.5, WBC 5.98 w neutrophils 4.27, trop 0.19 and BNP 2123, Cr 2.5, admitted to medicine for PNA though likely also has HF exacerbation and needs ACS r/o.

## 2023-09-11 NOTE — PHYSICAL THERAPY INITIAL EVALUATION ADULT - THERAPY FREQUENCY, PT EVAL
pt reporting he does not use or own an DME. Pt has 1STE without a hand rail, but he holds onto the doorway if needed. HE has ~4 steps within the home with a hand rail. 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 3-5x/week

## 2023-09-11 NOTE — PHYSICAL THERAPY INITIAL EVALUATION ADULT - GENERAL OBSERVATIONS, REHAB EVAL
Brief Postoperative Note    Jose D Castillo Shock  YOB: 1947  [id number]    Pre-operative Diagnosis: Liver mass;Lung CA      Post-operative Diagnosis: Same    Procedure: Liver bx    Medication Given: fentanyl    Anesthesia: 1%Lidocaine     Surgeons/Assistants:  Nikhil Maldonado MD and Ame Mcallister PA-C    Estimated Blood Loss: Minimal    Complications: none    Technically successful bx of liver mass. 3 core samples were obtained and given to the onsite pathologist that confirmed tissue sampling.      Electronically signed by SAUL Casper on 3/20/2023 at 1:02 PM [id number] am Chart reviewed. Pt. seen semirecline in bed, in No apparent distress , + telemetry , IV lock, Pt. alert and oriented X 4, used Lithuanian  but eventually was also speaking fluent English. Pt c/o lower back pain.

## 2023-09-11 NOTE — PROGRESS NOTE ADULT - SUBJECTIVE AND OBJECTIVE BOX
BELLA DIETRICHSHAWN  72y Male    CHIEF COMPLAINT:    Patient is a 72y old  Male who presents with a chief complaint of PNA, HF exacerbation (11 Sep 2023 13:43)      INTERVAL HPI/OVERNIGHT EVENTS:    Patient seen and examined.    ROS: All other systems are negative.    Vital Signs:    T(F): 98.4 (09-11-23 @ 12:53), Max: 98.4 (09-11-23 @ 12:53)  HR: 88 (09-11-23 @ 12:53) (85 - 88)  BP: 130/58 (09-11-23 @ 12:53) (130/58 - 159/69)  RR: 18 (09-11-23 @ 12:53) (18 - 18)  SpO2: 98% (09-11-23 @ 08:32) (98% - 98%)  I&O's Summary    10 Sep 2023 07:01  -  11 Sep 2023 07:00  --------------------------------------------------------  IN: 490 mL / OUT: 2585 mL / NET: -2095 mL    11 Sep 2023 07:01  -  11 Sep 2023 14:54  --------------------------------------------------------  IN: 210 mL / OUT: 0 mL / NET: 210 mL      Daily     Daily   CAPILLARY BLOOD GLUCOSE      POCT Blood Glucose.: 272 mg/dL (11 Sep 2023 11:48)  POCT Blood Glucose.: 148 mg/dL (11 Sep 2023 07:42)  POCT Blood Glucose.: 305 mg/dL (10 Sep 2023 21:33)  POCT Blood Glucose.: 207 mg/dL (10 Sep 2023 16:38)      PHYSICAL EXAM:    GENERAL:  NAD  SKIN: No rashes or lesions  HENT: Atraumatic. Normocephalic. PERRL. Moist membranes.  NECK: Supple, No JVD. No lymphadenopathy.  PULMONARY: CTA B/L. No wheezing. No rales  CVS: Normal S1, S2. Rate and Rhythm are regular. No murmurs.  ABDOMEN/GI: Soft, Nontender, Nondistended; BS present  EXTREMITIES: Peripheral pulses intact. No edema B/L LE.  NEUROLOGIC:  No motor or sensory deficit.  PSYCH: Alert & oriented x 3    Consultant(s) Notes Reviewed:  [x ] YES  [ ] NO  Care Discussed with Consultants/Other Providers [ x] YES  [ ] NO    EKG reviewed  Telemetry reviewed    LABS:                        7.1    5.25  )-----------( 222      ( 11 Sep 2023 08:30 )             22.3     09-11    137  |  100  |  43<H>  ----------------------------<  112<H>  3.7   |  22  |  2.5<H>    Ca    7.8<L>      11 Sep 2023 08:30  Phos  5.2     09-10  Mg     1.9     09-11    TPro  5.2<L>  /  Alb  2.6<L>  /  TBili  <0.2  /  DBili  x   /  AST  45<H>  /  ALT  15  /  AlkPhos  100  09-11        Trop 0.15, CKMB --, CK --, 09-10-23 @ 04:30  Trop 0.17, CKMB --, CK --, 09-10-23 @ 00:37  Trop 0.19, CKMB --, CK --, 09-09-23 @ 13:53      Culture - Acid Fast - Sputum w/Smear (collected 10 Sep 2023 03:49)  Source: .Sputum Sputum    Culture - Blood (collected 09 Sep 2023 16:33)  Source: .Blood Blood-Venous  Preliminary Report (11 Sep 2023 04:01):    No growth at 24 hours    Culture - Blood (collected 09 Sep 2023 16:33)  Source: .Blood Blood-Venous  Preliminary Report (11 Sep 2023 04:01):    No growth at 24 hours        RADIOLOGY & ADDITIONAL TESTS:      Imaging or report Personally Reviewed:  [x ] YES  [ ] NO    Medications:  Standing  albuterol    90 MICROgram(s) HFA Inhaler 2 Puff(s) Inhalation every 6 hours  albuterol/ipratropium for Nebulization 3 milliLiter(s) Nebulizer every 6 hours  aspirin  chewable 81 milliGRAM(s) Oral daily  budesonide 160 MICROgram(s)/formoterol 4.5 MICROgram(s) Inhaler 2 Puff(s) Inhalation two times a day  cefepime   IVPB 2000 milliGRAM(s) IV Intermittent every 12 hours  chlorhexidine 2% Cloths 1 Application(s) Topical <User Schedule>  dextrose 5%. 1000 milliLiter(s) IV Continuous <Continuous>  dextrose 5%. 1000 milliLiter(s) IV Continuous <Continuous>  dextrose 50% Injectable 12.5 Gram(s) IV Push once  dextrose 50% Injectable 25 Gram(s) IV Push once  dextrose 50% Injectable 25 Gram(s) IV Push once  furosemide   Injectable 40 milliGRAM(s) IV Push daily  glucagon  Injectable 1 milliGRAM(s) IntraMuscular once  heparin   Injectable 5000 Unit(s) SubCutaneous every 12 hours  influenza  Vaccine (HIGH DOSE) 0.7 milliLiter(s) IntraMuscular once  insulin lispro (ADMELOG) corrective regimen sliding scale   SubCutaneous at bedtime  insulin lispro (ADMELOG) corrective regimen sliding scale   SubCutaneous three times a day before meals  methylPREDNISolone sodium succinate Injectable 40 milliGRAM(s) IV Push every 12 hours  simvastatin 40 milliGRAM(s) Oral at bedtime    PRN Meds  acetaminophen     Tablet .. 650 milliGRAM(s) Oral every 6 hours PRN  dextrose Oral Gel 15 Gram(s) Oral once PRN  guaiFENesin  milliGRAM(s) Oral every 12 hours PRN  melatonin 3 milliGRAM(s) Oral at bedtime PRN      Case discussed with resident    Care discussed with pt/family           BELLA DIETRICHSHAWN  72y Male    CHIEF COMPLAINT:    Patient is a 72y old  Male who presents with a chief complaint of PNA, HF exacerbation (11 Sep 2023 13:43)      INTERVAL HPI/OVERNIGHT EVENTS:    Patient seen and examined. C/O cough with sputum. No fever. Breathing have improved.     ROS: All other systems are negative.    Vital Signs:    T(F): 98.4 (09-11-23 @ 12:53), Max: 98.4 (09-11-23 @ 12:53)  HR: 88 (09-11-23 @ 12:53) (85 - 88)  BP: 130/58 (09-11-23 @ 12:53) (130/58 - 159/69)  RR: 18 (09-11-23 @ 12:53) (18 - 18)  SpO2: 98% (09-11-23 @ 08:32) (98% - 98%)  I&O's Summary    10 Sep 2023 07:01  -  11 Sep 2023 07:00  --------------------------------------------------------  IN: 490 mL / OUT: 2585 mL / NET: -2095 mL    11 Sep 2023 07:01  -  11 Sep 2023 14:54  --------------------------------------------------------  IN: 210 mL / OUT: 0 mL / NET: 210 mL      Daily     Daily   CAPILLARY BLOOD GLUCOSE      POCT Blood Glucose.: 272 mg/dL (11 Sep 2023 11:48)  POCT Blood Glucose.: 148 mg/dL (11 Sep 2023 07:42)  POCT Blood Glucose.: 305 mg/dL (10 Sep 2023 21:33)  POCT Blood Glucose.: 207 mg/dL (10 Sep 2023 16:38)      PHYSICAL EXAM:    GENERAL:  NAD  SKIN: No rashes or lesions  HENT: Atraumatic. Normocephalic. PERRL. Moist membranes.  NECK: Supple, No JVD. No lymphadenopathy.  PULMONARY: +ve rales in the R lower chest post. No wheezing.   CVS: Normal S1, S2. Rate and Rhythm are regular. No murmurs.  ABDOMEN/GI: Soft, Nontender, Nondistended; BS present  EXTREMITIES: Peripheral pulses intact. No edema B/L LE.  NEUROLOGIC:  No motor or sensory deficit.  PSYCH: Alert & oriented x 3    Consultant(s) Notes Reviewed:  [x ] YES  [ ] NO  Care Discussed with Consultants/Other Providers [ x] YES  [ ] NO    EKG reviewed  Telemetry reviewed    LABS:                        7.1    5.25  )-----------( 222      ( 11 Sep 2023 08:30 )             22.3     09-11    137  |  100  |  43<H>  ----------------------------<  112<H>    Creatinine Trend: 2.5<--, 2.6<--, 2.5<--  3.7   |  22  |  2.5<H>    Ca    7.8<L>      11 Sep 2023 08:30  Phos  5.2     09-10  Mg     1.9     09-11    TPro  5.2<L>  /  Alb  2.6<L>  /  TBili  <0.2  /  DBili  x   /  AST  45<H>  /  ALT  15  /  AlkPhos  100  09-11        Trop 0.15, CKMB --, CK --, 09-10-23 @ 04:30  Trop 0.17, CKMB --, CK --, 09-10-23 @ 00:37  Trop 0.19, CKMB --, CK --, 09-09-23 @ 13:53      Culture - Acid Fast - Sputum w/Smear (collected 10 Sep 2023 03:49)  Source: .Sputum Sputum    Culture - Blood (collected 09 Sep 2023 16:33)  Source: .Blood Blood-Venous  Preliminary Report (11 Sep 2023 04:01):    No growth at 24 hours    Culture - Blood (collected 09 Sep 2023 16:33)  Source: .Blood Blood-Venous  Preliminary Report (11 Sep 2023 04:01):    No growth at 24 hours        RADIOLOGY & ADDITIONAL TESTS:    < from: Xray Chest 1 View- PORTABLE-Routine (Xray Chest 1 View- PORTABLE-Routine in AM.) (09.10.23 @ 07:46) >  IMPRESSION:    Stable to minimally increased right basal pleural-parenchymal opacity. No   pneumothorax.    Stable cardiomediastinal silhouette.    Unchanged osseous structures.    < end of copied text >  < from: [facility] Duplex Lower Ext Vein Scan, Bilat (09.11.23 @ 08:31) >    Impression:    No evidence of deep venous thrombosis or superficial thrombophlebitis in   the bilateral lower extremities.    < end of copied text >    Imaging or report Personally Reviewed:  [x ] YES  [ ] NO    Medications:  Standing  albuterol    90 MICROgram(s) HFA Inhaler 2 Puff(s) Inhalation every 6 hours  albuterol/ipratropium for Nebulization 3 milliLiter(s) Nebulizer every 6 hours  aspirin  chewable 81 milliGRAM(s) Oral daily  budesonide 160 MICROgram(s)/formoterol 4.5 MICROgram(s) Inhaler 2 Puff(s) Inhalation two times a day  cefepime   IVPB 2000 milliGRAM(s) IV Intermittent every 12 hours  chlorhexidine 2% Cloths 1 Application(s) Topical <User Schedule>  dextrose 5%. 1000 milliLiter(s) IV Continuous <Continuous>  dextrose 5%. 1000 milliLiter(s) IV Continuous <Continuous>  dextrose 50% Injectable 12.5 Gram(s) IV Push once  dextrose 50% Injectable 25 Gram(s) IV Push once  dextrose 50% Injectable 25 Gram(s) IV Push once  furosemide   Injectable 40 milliGRAM(s) IV Push daily  glucagon  Injectable 1 milliGRAM(s) IntraMuscular once  heparin   Injectable 5000 Unit(s) SubCutaneous every 12 hours  influenza  Vaccine (HIGH DOSE) 0.7 milliLiter(s) IntraMuscular once  insulin lispro (ADMELOG) corrective regimen sliding scale   SubCutaneous at bedtime  insulin lispro (ADMELOG) corrective regimen sliding scale   SubCutaneous three times a day before meals  methylPREDNISolone sodium succinate Injectable 40 milliGRAM(s) IV Push every 12 hours  simvastatin 40 milliGRAM(s) Oral at bedtime    PRN Meds  acetaminophen     Tablet .. 650 milliGRAM(s) Oral every 6 hours PRN  dextrose Oral Gel 15 Gram(s) Oral once PRN  guaiFENesin  milliGRAM(s) Oral every 12 hours PRN  melatonin 3 milliGRAM(s) Oral at bedtime PRN      Case discussed with resident    Care discussed with pt/family

## 2023-09-11 NOTE — PROGRESS NOTE ADULT - ASSESSMENT
Asya speaking 71 y/o man w PMHx of HTN, HLD, NIDDM, HFpEF w G1DD, CKD 3a w Cr 1.6, h/o latent TB s/p treatment ~6/2023, follicular lymphoma of stomach dx 2018 now follows Dr Reese Tirado @ [facility] [phone], on Rituximab since 8/17/23 w last dose 9/7/23 which pt tolerated well, and Revlimid which pt started 9/1/23 and last dose 9/7/23, as pt developed cough and epistaxis and was told by Dr Tirado to d/c Revlimid for now, has had SOB since 9/1/23 and productive cough since 9/5/23, had outpatient CXR w Dr Tirado which was reportedly unremarkable but when seen by PCP earlier today was told to go to ED for treatment of PNA, in ED found to have RLL opacity and given azithromycin/CTX, Tmax 99.5, WBC 5.98 w neutrophils 4.27, trop 0.19 and BNP 2123, Cr 2.5, admitted to medicine for PNA though likely also has HF exacerbation and needs ACS r/o.   HCP wife Mili Alonzo [phone]  Daughter Oksana Guzman [phone]    #PNA  #COPD   #SOB, Productive cough   SIRS (-) but has likely source, is immunocompromised, may not be able to mount full immune response.   DDx PNA most likely given RLL opacity, vs URI vs COPD exacerbation/bronchitis vs less likely TB reactivation in pt who hs already been treated for TB prior to starting chemo. Overlap HF exacerbation as well.   - Vanc/Cefepime in immunocompromised pt on chemo, though not neutropenic   - 9/11: MRSA -ve, vanc dced, c/w IV cefepime 2g  - procal pending  - Expanded RVP +ve for Rhinovirus, sputum culture -ve, acid fast sputum culture x3 -ve  - 9/9 BCx no growth at 24 hours  - Albuterol, Nebs, Symbicort  - NC 2L on admission, but no home O2 requirement; currently saturating well on RA    #HFpEF w G1DD  #HF exacerbation  #elevated trop   Significant crackles on exam, BLE edema pitting 2+  DDx HF exacerbation, but will need to evaluate for ACS as well. Trop may be elevated 2/2 MAXX vs demand as well.   - trop 0.19 from baseline 0.04, BNP 2123 w prior 600 on recent admission   - EKG on admission w/o changes, trend for EKG changes   - home Lasix 20mg PO QD> continue as 40mg IV QD for now   - Strict I&Os, daily weight  - f/u cardio for elevated trops  - f/u echo    #MAXX on CKD 3a w baseline Cr 1.6  Cr 2.5 on admission  - Hold home Losartan   - Increased Lasix as noted above, but may start nifedipine as needed for goal -140s  - Consult Nephro if worsening CKD/need for HD  - Low K/phos diet     #Hypocalcemia, mild  Ca 7.9, has had similar values in the past but family endorses spasms of BUE. Will give 1g Calcium gluconate as this may be symptom of hypocalcemia. Chvostek sign negative.   - 1g Ca gluconate   - EKG reviewed    #Anemia, normocytic   #h/o SHAI   - s/p iron infusions x5 on recent admission  - recent epistaxis which resolved after d/c revlimid   - no further bleeding episodes  - if trop increases and pt w ACS on this admission, Hb goal will be 8+     #HTN - hold Losartan given MAXX   #HLD - cont statin   #NIDDM - On oral antidiabetics at home - insulin regimen while admitted     #Follicular lymphoma of stomach dx 2018   now follows Dr Reees Tirado @ [facility] [phone], on Rituximab since 8/17/23 w last dose 9/7/23 which pt tolerated well, and Revlimid which pt started 9/1/23 and last dose 9/7/23                                                                              ----------------------------------------------------  # DVT prophylaxis: Heparin 5000u BID   # GI prophylaxis: N/A   # Diet: DASH TLC CC low K/phos 1200cc +glucerna   # Activity: Ambulate as Tolerated   # Code status: FULL CODE   # Disposition:                                                                            --------------------------------------------------------    # Handoff:   - f/u procal  - f/u echo  - f/u cardio Asya speaking 73 y/o man w PMHx of HTN, HLD, NIDDM, HFpEF w G1DD, CKD 3a w Cr 1.6, h/o latent TB s/p treatment ~6/2023, follicular lymphoma of stomach dx 2018 now follows Dr Reese Tirado @ [facility] [phone], on Rituximab since 8/17/23 w last dose 9/7/23 which pt tolerated well, and Revlimid which pt started 9/1/23 and last dose 9/7/23, as pt developed cough and epistaxis and was told by Dr Tirado to d/c Revlimid for now, has had SOB since 9/1/23 and productive cough since 9/5/23, had outpatient CXR w Dr Tirado which was reportedly unremarkable but when seen by PCP earlier today was told to go to ED for treatment of PNA, in ED found to have RLL opacity and given azithromycin/CTX, Tmax 99.5, WBC 5.98 w neutrophils 4.27, trop 0.19 and BNP 2123, Cr 2.5, admitted to medicine for PNA though likely also has HF exacerbation and needs ACS r/o.   HCP wife Mili Alonzo [phone]  Daughter Oksana Guzman [phone]    #PNA  #COPD   #SOB, Productive cough   SIRS (-) but has likely source, is immunocompromised, may not be able to mount full immune response.   DDx PNA most likely given RLL opacity, vs URI vs COPD exacerbation/bronchitis vs less likely TB reactivation in pt who hs already been treated for TB prior to starting chemo. Overlap HF exacerbation as well.   - Vanc/Cefepime in immunocompromised pt on chemo, though not neutropenic   - 9/11: MRSA -ve, vanc dced, c/w IV cefepime 2g  - procal pending  - Expanded RVP +ve for Rhinovirus, sputum culture -ve, acid fast sputum culture x3 -ve  - 9/9 BCx no growth at 24 hours  - Albuterol, Nebs, Symbicort  - NC 2L on admission, but no home O2 requirement; currently saturating well on RA    #HFpEF w G1DD  #HF exacerbation  #elevated trop   Significant crackles on exam, BLE edema pitting 2+  DDx HF exacerbation, but will need to evaluate for ACS as well. Trop may be elevated 2/2 MAXX vs demand as well.   - trop 0.19 from baseline 0.04, BNP 2123 w prior 600 on recent admission   - EKG on admission w/o changes, trend for EKG changes   - home Lasix 20mg PO QD> continue as 40mg IV QD for now   - Strict I&Os, daily weight  - 9/11 duplex: no evidence of DVT or superficial thrombophlebitis in b/l LE  - f/u cardio for elevated trops  - f/u echo    #MAXX on CKD 3a w baseline Cr 1.6  Cr 2.5 on admission  - Hold home Losartan   - Increased Lasix as noted above, but may start nifedipine as needed for goal -140s  - Consult Nephro if worsening CKD/need for HD  - Low K/phos diet     #Hypocalcemia, mild  Ca 7.9, has had similar values in the past but family endorses spasms of BUE. Will give 1g Calcium gluconate as this may be symptom of hypocalcemia. Chvostek sign negative.   - 1g Ca gluconate   - EKG reviewed    #Anemia, normocytic   #h/o SHAI   - s/p iron infusions x5 on recent admission  - recent epistaxis which resolved after d/c revlimid   - no further bleeding episodes  - if trop increases and pt w ACS on this admission, Hb goal will be 8+     #HTN - hold Losartan given MAXX   #HLD - cont statin   #NIDDM - On oral antidiabetics at home - insulin regimen while admitted     #Follicular lymphoma of stomach dx 2018   now follows Dr Reese Tirado @ [facility] [phone], on Rituximab since 8/17/23 w last dose 9/7/23 which pt tolerated well, and Revlimid which pt started 9/1/23 and last dose 9/7/23                                                                              ----------------------------------------------------  # DVT prophylaxis: Heparin 5000u BID   # GI prophylaxis: N/A   # Diet: DASH TLC CC low K/phos 1200cc +glucerna   # Activity: Ambulate as Tolerated   # Code status: FULL CODE   # Disposition:                                                                            --------------------------------------------------------    # Handoff:   - f/u procal  - f/u echo  - f/u cardio Asya speaking 71 y/o man w PMHx of HTN, HLD, NIDDM, HFpEF w G1DD, CKD 3a w Cr 1.6, h/o latent TB s/p treatment ~6/2023, follicular lymphoma of stomach dx 2018 now follows Dr Reese Tirado @ [facility] [phone], on Rituximab since 8/17/23 w last dose 9/7/23 which pt tolerated well, and Revlimid which pt started 9/1/23 and last dose 9/7/23, as pt developed cough and epistaxis and was told by Dr Tirado to d/c Revlimid for now, has had SOB since 9/1/23 and productive cough since 9/5/23, had outpatient CXR w Dr Tirado which was reportedly unremarkable but when seen by PCP earlier today was told to go to ED for treatment of PNA, in ED found to have RLL opacity and given azithromycin/CTX, Tmax 99.5, WBC 5.98 w neutrophils 4.27, trop 0.19 and BNP 2123, Cr 2.5, admitted to medicine for PNA though likely also has HF exacerbation and needs ACS r/o.   HCP wife Mili Alonzo [phone]  Daughter Oksana Guzman [phone]    #PNA  #COPD   #SOB, Productive cough   SIRS (-) but has likely source, is immunocompromised, may not be able to mount full immune response.   DDx PNA most likely given RLL opacity, vs URI vs COPD exacerbation/bronchitis vs less likely TB reactivation in pt who hs already been treated for TB prior to starting chemo. Overlap HF exacerbation as well.   - Vanc/Cefepime in immunocompromised pt on chemo, though not neutropenic   - 9/11: MRSA -ve, vanc dced, c/w IV cefepime 2g  - procal pending  - Expanded RVP +ve for Rhinovirus, sputum culture -ve, acid fast sputum culture x3 -ve  - 9/9 BCx no growth at 24 hours  - Albuterol, Nebs, Symbicort  - NC 2L on admission, but no home O2 requirement; currently saturating well on RA  - f/u RBUS    #HFpEF w G1DD  #HF exacerbation  #elevated trop   Significant crackles on exam, BLE edema pitting 2+  DDx HF exacerbation, but will need to evaluate for ACS as well. Trop may be elevated 2/2 MAXX vs demand as well.   - trop 0.19 from baseline 0.04, BNP 2123 w prior 600 on recent admission   - EKG on admission w/o changes, trend for EKG changes   - home Lasix 20mg PO QD> continue as 40mg IV QD for now   - Strict I&Os, daily weight  - 9/11 duplex: no evidence of DVT or superficial thrombophlebitis in b/l LE  - f/u cardio for elevated trops  - f/u echo    #MAXX on CKD 3a w baseline Cr 1.6  Cr 2.5 on admission  - Hold home Losartan   - Increased Lasix as noted above, but may start nifedipine as needed for goal -140s  - Consult Nephro if worsening CKD/need for HD  - Low K/phos diet     #Hypocalcemia, mild  Ca 7.9, has had similar values in the past but family endorses spasms of BUE. Will give 1g Calcium gluconate as this may be symptom of hypocalcemia. Chvostek sign negative.   - 1g Ca gluconate   - EKG reviewed    #Anemia, normocytic   #h/o SHAI   - s/p iron infusions x5 on recent admission  - recent epistaxis which resolved after d/c revlimid   - no further bleeding episodes  - if trop increases and pt w ACS on this admission, Hb goal will be 8+     #HTN - hold Losartan given MAXX   #HLD - cont statin   #NIDDM - On oral antidiabetics at home - insulin regimen while admitted     #Follicular lymphoma of stomach dx 2018   now follows Dr Reese Tirado @ [facility] [phone], on Rituximab since 8/17/23 w last dose 9/7/23 which pt tolerated well, and Revlimid which pt started 9/1/23 and last dose 9/7/23                                                                              ----------------------------------------------------  # DVT prophylaxis: Heparin 5000u BID   # GI prophylaxis: N/A   # Diet: DASH TLC CC low K/phos 1200cc +glucerna   # Activity: Ambulate as Tolerated   # Code status: FULL CODE   # Disposition:                                                                            --------------------------------------------------------    # Handoff:   - f/u procal  - f/u echo  - f/u cardio Asya speaking 73 y/o man w PMHx of HTN, HLD, NIDDM, HFpEF w G1DD, CKD 3a w Cr 1.6, h/o latent TB s/p treatment ~6/2023, follicular lymphoma of stomach dx 2018 now follows Dr Reese Tirado @ [facility] [phone], on Rituximab since 8/17/23 w last dose 9/7/23 which pt tolerated well, and Revlimid which pt started 9/1/23 and last dose 9/7/23, as pt developed cough and epistaxis and was told by Dr Tirado to d/c Revlimid for now, has had SOB since 9/1/23 and productive cough since 9/5/23, had outpatient CXR w Dr Tirado which was reportedly unremarkable but when seen by PCP earlier today was told to go to ED for treatment of PNA, in ED found to have RLL opacity and given azithromycin/CTX, Tmax 99.5, WBC 5.98 w neutrophils 4.27, trop 0.19 and BNP 2123, Cr 2.5, admitted to medicine for PNA though likely also has HF exacerbation and needs ACS r/o.   HCP wife Mili Alonzo [phone]  Daughter Oksana Guzman [phone]    #PNA  #COPD   #SOB, Productive cough   SIRS (-) but has likely source, is immunocompromised, may not be able to mount full immune response.   DDx PNA most likely given RLL opacity, vs URI vs COPD exacerbation/bronchitis vs less likely TB reactivation in pt who hs already been treated for TB prior to starting chemo. Overlap HF exacerbation as well.   - Vanc/Cefepime in immunocompromised pt on chemo, though not neutropenic   - 9/11: MRSA -ve, vanc dced, c/w IV cefepime 2g  - procal pending  - Expanded RVP +ve for Rhinovirus, sputum culture -ve, acid fast sputum culture x3 -ve  - 9/9 BCx no growth at 24 hours  - Albuterol, Nebs, Symbicort  - NC 2L on admission, but no home O2 requirement; currently saturating well on RA  - f/u RBUS    #HFpEF w G1DD  #HF exacerbation  #elevated trop   Significant crackles on exam, BLE edema pitting 2+  DDx HF exacerbation, but will need to evaluate for ACS as well. Trop may be elevated 2/2 MAXX vs demand as well.   - trop 0.19 from baseline 0.04, BNP 2123 w prior 600 on recent admission   - EKG on admission w/o changes, trend for EKG changes   - home Lasix 20mg PO QD> continue as 40mg IV QD for now   - Strict I&Os, daily weight  - 9/11 duplex: no evidence of DVT or superficial thrombophlebitis in b/l LE  - f/u cardio for elevated trops  - Elevated troponin likely type II MI.   - No further in patient workup from cardiology  - Outpatient follow up with cardiology   - f/u echo    #MAXX on CKD 3a w baseline Cr 1.6  Cr 2.5 on admission  - Hold home Losartan   - Increased Lasix as noted above, but may start nifedipine as needed for goal -140s  - Consult Nephro if worsening CKD/need for HD  - Low K/phos diet     #Hypocalcemia, mild  Ca 7.9, has had similar values in the past but family endorses spasms of BUE. Will give 1g Calcium gluconate as this may be symptom of hypocalcemia. Chvostek sign negative.   - 1g Ca gluconate   - EKG reviewed    #Anemia, normocytic   #h/o SHAI   - s/p iron infusions x5 on recent admission  - recent epistaxis which resolved after d/c revlimid   - no further bleeding episodes  - if trop increases and pt w ACS on this admission, Hb goal will be 8+     #HTN - hold Losartan given MAXX   #HLD - cont statin   #NIDDM - On oral antidiabetics at home - insulin regimen while admitted     #Follicular lymphoma of stomach dx 2018   now follows Dr Reese Tirado @ [facility] [phone], on Rituximab since 8/17/23 w last dose 9/7/23 which pt tolerated well, and Revlimid which pt started 9/1/23 and last dose 9/7/23                                                                              ----------------------------------------------------  # DVT prophylaxis: Heparin 5000u BID   # GI prophylaxis: N/A   # Diet: DASH/TLC/CC, low K/phos 1200cc +glucerna   # Activity: AAT  # Code status: FULL CODE                                                                            --------------------------------------------------------    # Handoff:   - f/u procal  - f/u echo

## 2023-09-11 NOTE — PHYSICAL THERAPY INITIAL EVALUATION ADULT - MANUAL MUSCLE TESTING RESULTS, REHAB EVAL
Plan: Will re-check labs in 3 months, lab order given today Detail Level: Zone Initiate Treatment: Vaseline to nails QHS\\nAmlactin QD.\\nFerrous sulfate 325 mg QD BLE grossly graded 4/5

## 2023-09-11 NOTE — PROGRESS NOTE ADULT - SUBJECTIVE AND OBJECTIVE BOX
24H events:    Patient is a 72y old Male who presents with a chief complaint of PNA, HF exacerbation (11 Sep 2023 10:08)    Primary diagnosis of Pneumonia    Day 1: admitted to     Today is hospital day 2d. This morning patient was seen and examined at bedside, resting comfortably in bed.    No acute or major events overnight. Hemodynamically stable, tolerating oral diet, voiding appropriately with appropriate bowel movements.     Patient states improvement of leg swelling but still has a productive cough with white sputum.    PAST MEDICAL & SURGICAL HISTORY  Diabetes mellitus    Hyperlipidemia    Lymphoma    HTN (hypertension)    Iron deficiency anemia    Treatment completed for tuberculosis    Chronic heart failure with preserved ejection fraction    Stage 3 chronic kidney disease    No significant past surgical history      SOCIAL HISTORY:  Social History:  walks w cane (09 Sep 2023 22:29)      ALLERGIES:  No Known Allergies    MEDICATIONS:  STANDING MEDICATIONS  albuterol    90 MICROgram(s) HFA Inhaler 2 Puff(s) Inhalation every 6 hours  albuterol/ipratropium for Nebulization 3 milliLiter(s) Nebulizer every 6 hours  aspirin  chewable 81 milliGRAM(s) Oral daily  budesonide 160 MICROgram(s)/formoterol 4.5 MICROgram(s) Inhaler 2 Puff(s) Inhalation two times a day  cefepime   IVPB 2000 milliGRAM(s) IV Intermittent every 12 hours  chlorhexidine 2% Cloths 1 Application(s) Topical <User Schedule>  dextrose 5%. 1000 milliLiter(s) IV Continuous <Continuous>  dextrose 5%. 1000 milliLiter(s) IV Continuous <Continuous>  dextrose 50% Injectable 12.5 Gram(s) IV Push once  dextrose 50% Injectable 25 Gram(s) IV Push once  dextrose 50% Injectable 25 Gram(s) IV Push once  furosemide   Injectable 40 milliGRAM(s) IV Push daily  glucagon  Injectable 1 milliGRAM(s) IntraMuscular once  heparin   Injectable 5000 Unit(s) SubCutaneous every 12 hours  influenza  Vaccine (HIGH DOSE) 0.7 milliLiter(s) IntraMuscular once  insulin lispro (ADMELOG) corrective regimen sliding scale   SubCutaneous three times a day before meals  insulin lispro (ADMELOG) corrective regimen sliding scale   SubCutaneous at bedtime  methylPREDNISolone sodium succinate Injectable 40 milliGRAM(s) IV Push every 12 hours  simvastatin 40 milliGRAM(s) Oral at bedtime    PRN MEDICATIONS  acetaminophen     Tablet .. 650 milliGRAM(s) Oral every 6 hours PRN  dextrose Oral Gel 15 Gram(s) Oral once PRN  guaiFENesin  milliGRAM(s) Oral every 12 hours PRN  melatonin 3 milliGRAM(s) Oral at bedtime PRN    VITALS:   T(F): 98.4  HR: 88  BP: 130/58  RR: 18  SpO2: 98%    PHYSICAL EXAM:  GENERAL: NAD, lying in bed comfortably  HEAD: NCAD  NECK: Supple, no neck stiffness/nuchal rigidity, no JVD    HEART: Regular rate and rhythm, normal S1/S2  LUNGS: No acute respiratory distress, crackles in the right lung bases  ABDOMEN:  soft, nontender, nondistended  EXTREMITIES: BLE 2+ edema, L > R  NERVOUS SYSTEM:  A&Ox3  SKIN: No rashes or lesions       LABS:                        7.1    5.25  )-----------( 222      ( 11 Sep 2023 08:30 )             22.3     09-11    137  |  100  |  43<H>  ----------------------------<  112<H>  3.7   |  22  |  2.5<H>    Ca    7.8<L>      11 Sep 2023 08:30  Phos  5.2     09-10  Mg     1.9     09-11    TPro  5.2<L>  /  Alb  2.6<L>  /  TBili  <0.2  /  DBili  x   /  AST  45<H>  /  ALT  15  /  AlkPhos  100  09-11      Urinalysis Basic - ( 11 Sep 2023 08:30 )    Color: x / Appearance: x / SG: x / pH: x  Gluc: 112 mg/dL / Ketone: x  / Bili: x / Urobili: x   Blood: x / Protein: x / Nitrite: x   Leuk Esterase: x / RBC: x / WBC x   Sq Epi: x / Non Sq Epi: x / Bacteria: x            Culture - Acid Fast - Sputum w/Smear (collected 10 Sep 2023 03:49)  Source: .Sputum Sputum    Culture - Blood (collected 09 Sep 2023 16:33)  Source: .Blood Blood-Venous  Preliminary Report (11 Sep 2023 04:01):    No growth at 24 hours    Culture - Blood (collected 09 Sep 2023 16:33)  Source: .Blood Blood-Venous  Preliminary Report (11 Sep 2023 04:01):    No growth at 24 hours      CARDIAC MARKERS ( 10 Sep 2023 04:30 )  x     / 0.15 ng/mL / x     / x     / x      CARDIAC MARKERS ( 10 Sep 2023 00:37 )  x     / 0.17 ng/mL / x     / x     / x      CARDIAC MARKERS ( 09 Sep 2023 13:53 )  x     / 0.19 ng/mL / x     / x     / x          RADIOLOGY:    < from: [facility] Duplex Lower Ext Vein Scan, Bilat (09.11.23 @ 08:31) >    ACC: [id number] EXAM:  DUPLEX SCAN EXT VEINS LOWER BI   ORDERED BY:   JANNY ROBB     PROCEDURE DATE:  09/11/2023          INTERPRETATION:  CLINICAL INFORMATION:The patient is a 72-year-old male   with lower extremity swelling. A venous duplex examination was performed   to evaluate the patient for deep venous thrombosis of the lower   extremities.    The common femoral, great saphenous, femoral, popliteal and small   saphenous veins were visualized bilaterally with no evidence of deep   venous thrombosis    All veins were fully compressible.  There was presence of spontaneous   flow, augmentation with distal compression and phasicity.    The anterior tibial veins were  patent    The posterior tibial veins were  patent    The peroneal veins were patent.    Impression:    No evidence of deep venous thrombosis or superficial thrombophlebitis in   the bilateral lower extremities.    ICD-10:M79.89    --- End of Report ---          KRISSY CAPONE MD; Vascular Fellow  This document has been electronically signed.  JAVIER SERRANO MD; Attending Vascular Surgery  This document has been electronically signed. Sep 11 2023 12:31PM    < end of copied text >

## 2023-09-12 LAB
ALBUMIN SERPL ELPH-MCNC: 2.9 G/DL — LOW (ref 3.5–5.2)
ALP SERPL-CCNC: 89 U/L — SIGNIFICANT CHANGE UP (ref 30–115)
ALT FLD-CCNC: 20 U/L — SIGNIFICANT CHANGE UP (ref 0–41)
ANION GAP SERPL CALC-SCNC: 16 MMOL/L — HIGH (ref 7–14)
AST SERPL-CCNC: 63 U/L — HIGH (ref 0–41)
BILIRUB SERPL-MCNC: <0.2 MG/DL — SIGNIFICANT CHANGE UP (ref 0.2–1.2)
BUN SERPL-MCNC: 52 MG/DL — HIGH (ref 10–20)
CALCIUM SERPL-MCNC: 7.9 MG/DL — LOW (ref 8.4–10.5)
CHLORIDE SERPL-SCNC: 97 MMOL/L — LOW (ref 98–110)
CO2 SERPL-SCNC: 24 MMOL/L — SIGNIFICANT CHANGE UP (ref 17–32)
CREAT SERPL-MCNC: 2.8 MG/DL — HIGH (ref 0.7–1.5)
EGFR: 23 ML/MIN/1.73M2 — LOW
GLUCOSE BLDC GLUCOMTR-MCNC: 103 MG/DL — HIGH (ref 70–99)
GLUCOSE BLDC GLUCOMTR-MCNC: 192 MG/DL — HIGH (ref 70–99)
GLUCOSE BLDC GLUCOMTR-MCNC: 226 MG/DL — HIGH (ref 70–99)
GLUCOSE BLDC GLUCOMTR-MCNC: 252 MG/DL — HIGH (ref 70–99)
GLUCOSE SERPL-MCNC: 180 MG/DL — HIGH (ref 70–99)
HCT VFR BLD CALC: 24.3 % — LOW (ref 42–52)
HGB BLD-MCNC: 7.7 G/DL — LOW (ref 14–18)
IRON SATN MFR SERPL: 92 UG/DL — SIGNIFICANT CHANGE UP (ref 35–150)
MAGNESIUM SERPL-MCNC: 1.8 MG/DL — SIGNIFICANT CHANGE UP (ref 1.8–2.4)
MCHC RBC-ENTMCNC: 27.2 PG — SIGNIFICANT CHANGE UP (ref 27–31)
MCHC RBC-ENTMCNC: 31.7 G/DL — LOW (ref 32–37)
MCV RBC AUTO: 85.9 FL — SIGNIFICANT CHANGE UP (ref 80–94)
NRBC # BLD: 0 /100 WBCS — SIGNIFICANT CHANGE UP (ref 0–0)
PLATELET # BLD AUTO: 322 K/UL — SIGNIFICANT CHANGE UP (ref 130–400)
PMV BLD: 9.5 FL — SIGNIFICANT CHANGE UP (ref 7.4–10.4)
POTASSIUM SERPL-MCNC: 3.4 MMOL/L — LOW (ref 3.5–5)
POTASSIUM SERPL-SCNC: 3.4 MMOL/L — LOW (ref 3.5–5)
PROT SERPL-MCNC: 5.4 G/DL — LOW (ref 6–8)
RBC # BLD: 2.83 M/UL — LOW (ref 4.7–6.1)
RBC # FLD: 13.6 % — SIGNIFICANT CHANGE UP (ref 11.5–14.5)
SODIUM SERPL-SCNC: 137 MMOL/L — SIGNIFICANT CHANGE UP (ref 135–146)
TRANSFERRIN SERPL-MCNC: 209 MG/DL — SIGNIFICANT CHANGE UP (ref 200–360)
WBC # BLD: 6.95 K/UL — SIGNIFICANT CHANGE UP (ref 4.8–10.8)
WBC # FLD AUTO: 6.95 K/UL — SIGNIFICANT CHANGE UP (ref 4.8–10.8)

## 2023-09-12 PROCEDURE — 99232 SBSQ HOSP IP/OBS MODERATE 35: CPT

## 2023-09-12 PROCEDURE — 71045 X-RAY EXAM CHEST 1 VIEW: CPT | Mod: 26

## 2023-09-12 RX ORDER — POTASSIUM CHLORIDE 20 MEQ
40 PACKET (EA) ORAL EVERY 4 HOURS
Refills: 0 | Status: COMPLETED | OUTPATIENT
Start: 2023-09-12 | End: 2023-09-12

## 2023-09-12 RX ORDER — TAMSULOSIN HYDROCHLORIDE 0.4 MG/1
0.4 CAPSULE ORAL AT BEDTIME
Refills: 0 | Status: DISCONTINUED | OUTPATIENT
Start: 2023-09-12 | End: 2023-09-12

## 2023-09-12 RX ADMIN — Medication 81 MILLIGRAM(S): at 12:12

## 2023-09-12 RX ADMIN — Medication 1: at 21:43

## 2023-09-12 RX ADMIN — HEPARIN SODIUM 5000 UNIT(S): 5000 INJECTION INTRAVENOUS; SUBCUTANEOUS at 18:41

## 2023-09-12 RX ADMIN — Medication 40 MILLIEQUIVALENT(S): at 16:49

## 2023-09-12 RX ADMIN — Medication 3 MILLIGRAM(S): at 21:41

## 2023-09-12 RX ADMIN — Medication 1: at 12:12

## 2023-09-12 RX ADMIN — BUDESONIDE AND FORMOTEROL FUMARATE DIHYDRATE 2 PUFF(S): 160; 4.5 AEROSOL RESPIRATORY (INHALATION) at 20:35

## 2023-09-12 RX ADMIN — Medication 3 MILLILITER(S): at 13:25

## 2023-09-12 RX ADMIN — Medication 3 MILLILITER(S): at 08:32

## 2023-09-12 RX ADMIN — SIMVASTATIN 40 MILLIGRAM(S): 20 TABLET, FILM COATED ORAL at 21:40

## 2023-09-12 RX ADMIN — Medication 40 MILLIGRAM(S): at 06:38

## 2023-09-12 RX ADMIN — Medication 40 MILLIEQUIVALENT(S): at 18:30

## 2023-09-12 RX ADMIN — Medication 2: at 16:49

## 2023-09-12 RX ADMIN — CHLORHEXIDINE GLUCONATE 1 APPLICATION(S): 213 SOLUTION TOPICAL at 06:07

## 2023-09-12 RX ADMIN — HEPARIN SODIUM 5000 UNIT(S): 5000 INJECTION INTRAVENOUS; SUBCUTANEOUS at 06:38

## 2023-09-12 RX ADMIN — CEFEPIME 100 MILLIGRAM(S): 1 INJECTION, POWDER, FOR SOLUTION INTRAMUSCULAR; INTRAVENOUS at 06:38

## 2023-09-12 RX ADMIN — Medication 3 MILLILITER(S): at 20:25

## 2023-09-12 RX ADMIN — CEFEPIME 100 MILLIGRAM(S): 1 INJECTION, POWDER, FOR SOLUTION INTRAMUSCULAR; INTRAVENOUS at 18:33

## 2023-09-12 NOTE — PROGRESS NOTE ADULT - SUBJECTIVE AND OBJECTIVE BOX
24H events:    Patient is a 72y old Male who presents with a chief complaint of PNA, HF exacerbation (11 Sep 2023 14:53)    Primary diagnosis of Pneumonia    Day 3: s/p straight cath for urinary retention x1    Today is hospital day 3d. This morning patient was seen and examined at bedside, resting comfortably in bed.    No acute or major events overnight.       PAST MEDICAL & SURGICAL HISTORY  Diabetes mellitus    Hyperlipidemia    Lymphoma    HTN (hypertension)    Iron deficiency anemia    Treatment completed for tuberculosis    Chronic heart failure with preserved ejection fraction    Stage 3 chronic kidney disease    No significant past surgical history      SOCIAL HISTORY:  Social History:  walks w cane (09 Sep 2023 22:29)      ALLERGIES:  No Known Allergies    MEDICATIONS:  STANDING MEDICATIONS  albuterol    90 MICROgram(s) HFA Inhaler 2 Puff(s) Inhalation every 6 hours  albuterol/ipratropium for Nebulization 3 milliLiter(s) Nebulizer every 6 hours  aspirin  chewable 81 milliGRAM(s) Oral daily  budesonide 160 MICROgram(s)/formoterol 4.5 MICROgram(s) Inhaler 2 Puff(s) Inhalation two times a day  cefepime   IVPB 2000 milliGRAM(s) IV Intermittent every 12 hours  chlorhexidine 2% Cloths 1 Application(s) Topical <User Schedule>  dextrose 5%. 1000 milliLiter(s) IV Continuous <Continuous>  dextrose 5%. 1000 milliLiter(s) IV Continuous <Continuous>  dextrose 50% Injectable 25 Gram(s) IV Push once  dextrose 50% Injectable 25 Gram(s) IV Push once  dextrose 50% Injectable 12.5 Gram(s) IV Push once  furosemide   Injectable 40 milliGRAM(s) IV Push daily  glucagon  Injectable 1 milliGRAM(s) IntraMuscular once  heparin   Injectable 5000 Unit(s) SubCutaneous every 12 hours  influenza  Vaccine (HIGH DOSE) 0.7 milliLiter(s) IntraMuscular once  insulin lispro (ADMELOG) corrective regimen sliding scale   SubCutaneous three times a day before meals  insulin lispro (ADMELOG) corrective regimen sliding scale   SubCutaneous at bedtime  simvastatin 40 milliGRAM(s) Oral at bedtime  tamsulosin 0.4 milliGRAM(s) Oral at bedtime    PRN MEDICATIONS  acetaminophen     Tablet .. 650 milliGRAM(s) Oral every 6 hours PRN  dextrose Oral Gel 15 Gram(s) Oral once PRN  guaiFENesin  milliGRAM(s) Oral every 12 hours PRN  melatonin 3 milliGRAM(s) Oral at bedtime PRN    VITALS:   T(F): 98.4  HR: 79  BP: 141/70  RR: 18  SpO2: --    PHYSICAL EXAM:  GENERAL: NAD, lying in bed comfortably  HEAD: NCAD  NECK: Supple, no neck stiffness/nuchal rigidity, no JVD    HEART: Regular rate and rhythm, normal S1/S2  LUNGS: No acute respiratory distress, crackles in the right lung bases  ABDOMEN:  soft, nontender, nondistended  EXTREMITIES: BLE 2+ edema, L > R  NERVOUS SYSTEM:  A&Ox3  SKIN: No rashes or lesions           LABS:                        7.1    5.25  )-----------( 222      ( 11 Sep 2023 08:30 )             22.3     09-11    137  |  100  |  43<H>  ----------------------------<  112<H>  3.7   |  22  |  2.5<H>    Ca    7.8<L>      11 Sep 2023 08:30  Mg     1.9     09-11    TPro  5.2<L>  /  Alb  2.6<L>  /  TBili  <0.2  /  DBili  x   /  AST  45<H>  /  ALT  15  /  AlkPhos  100  09-11      Urinalysis Basic - ( 11 Sep 2023 08:30 )    Color: x / Appearance: x / SG: x / pH: x  Gluc: 112 mg/dL / Ketone: x  / Bili: x / Urobili: x   Blood: x / Protein: x / Nitrite: x   Leuk Esterase: x / RBC: x / WBC x   Sq Epi: x / Non Sq Epi: x / Bacteria: x            Culture - Acid Fast - Sputum w/Smear (collected 10 Sep 2023 03:49)  Source: .Sputum Sputum    Culture - Blood (collected 09 Sep 2023 16:33)  Source: .Blood Blood-Venous  Preliminary Report (12 Sep 2023 04:01):    No growth at 48 Hours    Culture - Blood (collected 09 Sep 2023 16:33)  Source: .Blood Blood-Venous  Preliminary Report (12 Sep 2023 04:01):    No growth at 48 Hours          RADIOLOGY:    < from: US Kidney and Bladder (09.11.23 @ 21:13) >    ACC: [id number] EXAM:  US KIDNEYS AND BLADDER   ORDERED BY: CHANDU LEYVA     PROCEDURE DATE:  09/11/2023          INTERPRETATION:  CLINICAL INFORMATION: Worsening renal function    COMPARISON: May 27, 2023    TECHNIQUE: Sonography of the kidneys and bladder.    FINDINGS:    Right kidney: 10.1 cm. 2 cm upper pole angiomyolipoma    Left kidney:  10.8 cm. No hydronephrosis or calculi.    Urinary bladder: Urinary bladder is not distended.    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 Otherwise unremarkable study.    Thank you for the courtesy of this consult.    Sincerely,    Velia Kee MD        --- End of Report ---            VELIA KEE MD; Attending Interventional Radiologist  This document has been electronically signed. Sep 12 2023  8:09AM    < end of copied text >

## 2023-09-12 NOTE — PROGRESS NOTE ADULT - ASSESSMENT
71 y/o man w PMHx of HTN, HLD, NIDDM, HFpEF w G1DD, CKD 3a w Cr 1.6, h/o latent TB s/p treatment ~6/2023, follicular lymphoma of stomach dx 2018 now follows Dr Reese Tirado @ [facility] [phone], on Rituximab since 8/17/23 w last dose 9/7/23 which pt tolerated well, and Revlimid which pt started 9/1/23 and last dose 9/7/23, as pt developed cough and epistaxis and was told by Dr Tirado to d/c Revlimid for now, has had SOB since 9/1/23 and productive cough since 9/5/23    RLL PNA  Acute HFpEF  DM-2 / HTN / DL  MAXX on CKD-3  Follicular Lymphoma of the stomach   Normocytic Anemia                PLAN:    ·	Tele reviewed. No events  ·	Troponin are elevated but trending down.   ·	Pro BNP is 2123  ·	Cardiology eval  ·	ECHO  ·	Blood cxs x 2 are negative.   ·	CXR showed stable to minimally increase basal pleural-parenchymal opacity  ·	Serum Procalcitonin is 0.17 which is negative. Pt likely has PNA even though procalcitonin is negative  ·	MRSA PCR is negative  ·	Check sputum cx, urine Legionella and Streptococcal Ag.   ·	Will cont IV Cefepime.   ·	MAXX: Renal/bladder US. Check PVR  ·	Monitor electrolytes and renal function  ·	Will cont Lasix 40 mg iv daily for now  ·	Check i's and o's and daily wt  ·	Low salt diet and water restriction to 1.5 L/D  ·	D/C Solumedrol  ·	Repeat CXR in AM  ·	Iron studies  ·	Monitor FS. On Insulin corrective regimen    Progress Note Handoff    Pending (specify):  Consults_Cardiology________, Tests________, Test Results_______, Other__Suspected PNA, AKI_______  Family discussion:  Disposition: Home___/SNF___/Other________/Unknown at this time________    Odin Ly MD  Spectra: [id number]     73 y/o man w PMHx of HTN, HLD, NIDDM, HFpEF w G1DD, CKD 3a w Cr 1.6, h/o latent TB s/p treatment ~6/2023, follicular lymphoma of stomach dx 2018 now follows Dr Reese Tirado @ [facility] [phone], on Rituximab since 8/17/23 w last dose 9/7/23 which pt tolerated well, and Revlimid which pt started 9/1/23 and last dose 9/7/23, as pt developed cough and epistaxis and was told by Dr Tirado to d/c Revlimid for now, has had SOB since 9/1/23 and productive cough since 9/5/23    NO PNA  Acute bronchitis  Acute HFpEF  DM-2 / HTN / DL  MAXX on CKD-3  Follicular Lymphoma of the stomach   Normocytic Anemia                PLAN:    ·	Tele reviewed. No events  ·	Cardiology eval noted. Recommended out pt f/u  ·	Repeat CXR noted. RLL opacity has resolved. Likely it was fluid. No PNA  ·	Troponin are elevated but trending down.   ·	Pro BNP is 2123  ·	Cardiology eval  ·	ECHO showed EF is 70-75%. IVC was normal   ·	Blood cxs x 2 are negative.   ·	Admission CXR showed stable to minimally increased basal pleural-parenchymal opacity  ·	Serum Procalcitonin is 0.17 which is negative. Unlikely PNA  ·	MRSA PCR is negative  ·	D/C Abx  ·	Renal/bladder US showed 2 cm upper pole R angiomyolipoma. No hydronephrosis  ·	Monitor electrolytes and renal function  ·	Cr is trending up. D/C Lasix  ·	Check i's and o's and daily wt  ·	Low salt diet and water restriction to 1.5 L/D  ·	Iron studies  ·	Monitor FS. On Insulin corrective regimen    Progress Note Handoff    Pending (specify):  Consults________, Tests________, Test Results_______, Other__AKI_______  Family discussion:  Disposition: Home___/SNF___/Other________/Unknown at this time________    Odin Ly MD  Spectra: [phone]

## 2023-09-12 NOTE — PROGRESS NOTE ADULT - ASSESSMENT
Asya speaking 71 y/o man w PMHx of HTN, HLD, NIDDM, HFpEF w G1DD, CKD 3a w Cr 1.6, h/o latent TB s/p treatment ~6/2023, follicular lymphoma of stomach dx 2018 now follows Dr Reese Tirado @ [facility] [phone], on Rituximab since 8/17/23 w last dose 9/7/23 which pt tolerated well, and Revlimid which pt started 9/1/23 and last dose 9/7/23, as pt developed cough and epistaxis and was told by Dr Tirado to d/c Revlimid for now, has had SOB since 9/1/23 and productive cough since 9/5/23, had outpatient CXR w Dr Tirado which was reportedly unremarkable but when seen by PCP earlier today was told to go to ED for treatment of PNA, in ED found to have RLL opacity and given azithromycin/CTX, Tmax 99.5, WBC 5.98 w neutrophils 4.27, trop 0.19 and BNP 2123, Cr 2.5, admitted to medicine for PNA though likely also has HF exacerbation and needs ACS r/o.   HCP wife Mili Alonzo [phone]  Daughter Oksana Guzman [phone]    #PNA  #COPD   #SOB, Productive cough   SIRS (-) but has likely source, is immunocompromised, may not be able to mount full immune response.   DDx PNA most likely given RLL opacity, vs URI vs COPD exacerbation/bronchitis vs less likely TB reactivation in pt who hs already been treated for TB prior to starting chemo. Overlap HF exacerbation as well.   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 Otherwise unremarkable study.  - f/u sputum fultures    #HFpEF w G1DD  #HF exacerbation  #elevated trop   Significant crackles on exam, BLE edema pitting 2+  DDx HF exacerbation, but will need to evaluate for ACS as well. Trop may be elevated 2/2 MAXX vs demand as well.   - trop 0.19 from baseline 0.04, BNP 2123 w prior 600 on recent admission   - EKG on admission w/o changes, trend for EKG changes   - home Lasix 20mg PO QD> continue as 40mg IV QD for now   - Strict I&Os, daily weight  - 9/11 duplex: no evidence of DVT or superficial thrombophlebitis in b/l LE  - f/u cardio for elevated trops  - Elevated troponin likely type II MI.   - No further in patient workup from cardiology  - Outpatient follow up with cardiology     #Urinary Retention poss 2/2 BPH?  - 9/12 bladder scan: 500 cc, s/p straight cath  - stared on flomax 0.4mg     #MAXX on CKD 3a w baseline Cr 1.6  Cr 2.5 on admission  - Hold home Losartan   - Increased Lasix as noted above, but may start nifedipine as needed for goal -140s  - Consult Nephro if worsening CKD/need for HD  - Low K/phos diet     #Hypocalcemia, mild  Ca 7.9, has had similar values in the past but family endorses spasms of BUE. Will give 1g Calcium gluconate as this may be symptom of hypocalcemia. Chvostek sign negative.   - 1g Ca gluconate   - EKG reviewed    #Anemia, normocytic   #h/o SHAI   - s/p iron infusions x5 on recent admission  - recent epistaxis which resolved after d/c revlimid   - no further bleeding episodes  - if trop increases and pt w ACS on this admission, Hb goal will be 8+     #HTN - hold Losartan given MAXX   #HLD - cont statin   #NIDDM - On oral antidiabetics at home - insulin regimen while admitted     #Follicular lymphoma of stomach dx 2018   now follows Dr Reese Tirado @ [facility] [phone], on Rituximab since 8/17/23 w last dose 9/7/23 which pt tolerated well, and Revlimid which pt started 9/1/23 and last dose 9/7/23                                                                              ----------------------------------------------------  # DVT prophylaxis: Heparin 5000u BID   # GI prophylaxis: N/A   # Diet: DASH/TLC/CC, low K/phos 1200cc +glucerna   # Activity: AAT  # Code status: FULL CODE                                                                            --------------------------------------------------------    # Handoff:   - f/u 11a labs  - f/u cultures   Asya speaking 71 y/o man w PMHx of HTN, HLD, NIDDM, HFpEF w G1DD, CKD 3a w Cr 1.6, h/o latent TB s/p treatment ~6/2023, follicular lymphoma of stomach dx 2018 now follows Dr Reese Tirado @ [facility] [phone], on Rituximab since 8/17/23 w last dose 9/7/23 which pt tolerated well, and Revlimid which pt started 9/1/23 and last dose 9/7/23, as pt developed cough and epistaxis and was told by Dr Tirado to d/c Revlimid for now, has had SOB since 9/1/23 and productive cough since 9/5/23, had outpatient CXR w Dr Tirado which was reportedly unremarkable but when seen by PCP earlier today was told to go to ED for treatment of PNA, in ED found to have RLL opacity and given azithromycin/CTX, Tmax 99.5, WBC 5.98 w neutrophils 4.27, trop 0.19 and BNP 2123, Cr 2.5, admitted to medicine for PNA though likely also has HF exacerbation and needs ACS r/o.   HCP wife Mili Alonzo [phone]  Daughter Oksana Guzman [phone]    #PNA  #COPD   #SOB, Productive cough   SIRS (-) but has likely source, is immunocompromised, may not be able to mount full immune response.   DDx PNA most likely given RLL opacity, vs URI vs COPD exacerbation/bronchitis vs less likely TB reactivation in pt who hs already been treated for TB prior to starting chemo. Overlap HF exacerbation as well.   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 Otherwise unremarkable study.  - f/u sputum fultures    #HFpEF w G1DD  #HF exacerbation  #elevated trop   Significant crackles on exam, BLE edema pitting 2+  DDx HF exacerbation, but will need to evaluate for ACS as well. Trop may be elevated 2/2 MAXX vs demand as well.   - trop 0.19 from baseline 0.04, BNP 2123 w prior 600 on recent admission   - EKG on admission w/o changes, trend for EKG changes   - home Lasix 20mg PO QD> continue as 40mg IV QD for now   - Strict I&Os, daily weight  - 9/11 duplex: no evidence of DVT or superficial thrombophlebitis in b/l LE  - f/u cardio for elevated trops  - Elevated troponin likely type II MI.   - No further in patient workup from cardiology  - Outpatient follow up with cardiology     #Urinary Retention poss 2/2 BPH?  - 9/12 bladder scan: 500 cc, s/p straight cath  - stared on flomax 0.4mg     #MAXX on CKD 3a w baseline Cr 1.6  Cr 2.5 on admission  - Hold home Losartan   - Increased Lasix as noted above, but may start nifedipine as needed for goal -140s  - Consult Nephro if worsening CKD/need for HD  - Low K/phos diet     #Hypocalcemia, mild  Ca 7.9, has had similar values in the past but family endorses spasms of BUE. Will give 1g Calcium gluconate as this may be symptom of hypocalcemia. Chvostek sign negative.   - 1g Ca gluconate   - EKG reviewed    #Anemia, normocytic   #h/o SHAI   - s/p iron infusions x5 on recent admission  - recent epistaxis which resolved after d/c revlimid   - no further bleeding episodes  - if trop increases and pt w ACS on this admission, Hb goal will be 8+   - f/u iron studies    #HTN - hold Losartan given MAXX   #HLD - cont statin   #NIDDM - On oral antidiabetics at home - insulin regimen while admitted     #Follicular lymphoma of stomach dx 2018   now follows Dr Reese Tirado @ [facility] [phone], on Rituximab since 8/17/23 w last dose 9/7/23 which pt tolerated well, and Revlimid which pt started 9/1/23 and last dose 9/7/23                                                                              ----------------------------------------------------  # DVT prophylaxis: Heparin 5000u BID   # GI prophylaxis: N/A   # Diet: DASH/TLC/CC, low K/phos 1200cc +glucerna   # Activity: AAT  # Code status: FULL CODE                                                                            --------------------------------------------------------    # Handoff:   - f/u 11a labs  - f/u cultures  - f/u iron studies   Asya speaking 73 y/o man w PMHx of HTN, HLD, NIDDM, HFpEF w G1DD, CKD 3a w Cr 1.6, h/o latent TB s/p treatment ~6/2023, follicular lymphoma of stomach dx 2018 now follows Dr Reese Tirado @ [facility] [phone], on Rituximab since 8/17/23 w last dose 9/7/23 which pt tolerated well, and Revlimid which pt started 9/1/23 and last dose 9/7/23, as pt developed cough and epistaxis and was told by Dr Tirado to d/c Revlimid for now, has had SOB since 9/1/23 and productive cough since 9/5/23, had outpatient CXR w Dr Tirado which was reportedly unremarkable but when seen by PCP earlier today was told to go to ED for treatment of PNA, in ED found to have RLL opacity and given azithromycin/CTX, Tmax 99.5, WBC 5.98 w neutrophils 4.27, trop 0.19 and BNP 2123, Cr 2.5, admitted to medicine for PNA though likely also has HF exacerbation and needs ACS r/o.   HCP wife Mili Alonzo [phone]  Daughter Oksana Guzman [phone]    #PNA  #COPD   #SOB, Productive cough   SIRS (-) but has likely source, is immunocompromised, may not be able to mount full immune response.   DDx PNA most likely given RLL opacity, vs URI vs COPD exacerbation/bronchitis vs less likely TB reactivation in pt who hs already been treated for TB prior to starting chemo. Overlap HF exacerbation as well.   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 Otherwise unremarkable study.  - f/u sputum cultures    #HFpEF w G1DD  #HF exacerbation  #elevated trop   Significant crackles on exam, BLE edema pitting 2+  DDx HF exacerbation, but will need to evaluate for ACS as well. Trop may be elevated 2/2 MAXX vs demand as well.   - trop 0.19 from baseline 0.04, BNP 2123 w prior 600 on recent admission   - EKG on admission w/o changes, trend for EKG changes   - home Lasix 20mg PO QD> continue as 40mg IV QD for now   - Strict I&Os, daily weight  - 9/11 duplex: no evidence of DVT or superficial thrombophlebitis in b/l LE  - f/u cardio for elevated trops  - Elevated troponin likely type II MI.   - No further in patient workup from cardiology  - Outpatient follow up with cardiology   - 9/11 echo:  < from: TTE Echo Complete w/ Contrast w/ Doppler (09.11.23 @ 13:10) >  1. Endocardial visualization was enhanced with intravenous echo contrast.   2. Left ventricular ejection fraction, by visual estimation, is 70 to   75%.   3. Hyperdynamicglobal left ventricular systolic function.   4. Spectral Doppler shows impaired relaxation pattern of left   ventricular myocardial filling (Grade I diastolic dysfunction).   5. Mild left ventricular hypertrophy.    #Urinary Retention poss 2/2 BPH?  - 9/12 bladder scan: 500 cc, s/p straight cath  - stared on flomax 0.4mg     #MAXX on CKD 3a w baseline Cr 1.6  Cr 2.5 on admission  - Hold home Losartan   - Increased Lasix as noted above, but may start nifedipine as needed for goal -140s  - Consult Nephro if worsening CKD/need for HD  - Low K/phos diet     #Hypocalcemia, mild  Ca 7.9, has had similar values in the past but family endorses spasms of BUE. Will give 1g Calcium gluconate as this may be symptom of hypocalcemia. Chvostek sign negative.   - 1g Ca gluconate   - EKG reviewed    #Anemia, normocytic   #h/o SHAI   - s/p iron infusions x5 on recent admission  - recent epistaxis which resolved after d/c revlimid   - no further bleeding episodes  - if trop increases and pt w ACS on this admission, Hb goal will be 8+   - f/u iron studies    #HTN - hold Losartan given MAXX   #HLD - cont statin   #NIDDM - On oral antidiabetics at home - insulin regimen while admitted     #Follicular lymphoma of stomach dx 2018   now follows Dr Reese Tirado @ [facility] [phone], on Rituximab since 8/17/23 w last dose 9/7/23 which pt tolerated well, and Revlimid which pt started 9/1/23 and last dose 9/7/23                                                                              ----------------------------------------------------  # DVT prophylaxis: Heparin 5000u BID   # GI prophylaxis: N/A   # Diet: DASH/TLC/CC, low K/phos 1200cc +glucerna   # Activity: AAT  # Code status: FULL CODE                                                                            --------------------------------------------------------    # Handoff:   - f/u 11a labs  - f/u cultures  - f/u iron studies

## 2023-09-12 NOTE — PROGRESS NOTE ADULT - SUBJECTIVE AND OBJECTIVE BOX
ROSS DIETRICH  72y Male    CHIEF COMPLAINT:    Patient is a 72y old  Male who presents with a chief complaint of PNA, HF exacerbation (12 Sep 2023 10:59)      INTERVAL HPI/OVERNIGHT EVENTS:    Patient seen and examined. Still having cough with sputum. No fever. Breathing has improved.     ROS: All other systems are negative.    Vital Signs:    T(F): 98.4 (09-11-23 @ 20:09), Max: 98.4 (09-11-23 @ 12:53)  HR: 79 (09-12-23 @ 06:30) (79 - 88)  BP: 141/70 (09-12-23 @ 06:30) (128/60 - 141/70)  RR: 18 (09-12-23 @ 06:30) (18 - 18)  SpO2: --  I&O's Summary    11 Sep 2023 07:01  -  12 Sep 2023 07:00  --------------------------------------------------------  IN: 210 mL / OUT: 0 mL / NET: 210 mL      Daily     Daily   CAPILLARY BLOOD GLUCOSE      POCT Blood Glucose.: 192 mg/dL (12 Sep 2023 11:17)  POCT Blood Glucose.: 103 mg/dL (12 Sep 2023 08:17)  POCT Blood Glucose.: 341 mg/dL (11 Sep 2023 21:37)  POCT Blood Glucose.: 313 mg/dL (11 Sep 2023 16:27)      PHYSICAL EXAM:    GENERAL:  NAD  SKIN: No rashes or lesions  HENT: Atraumatic. Normocephalic. PERRL. Moist membranes.  NECK: Supple, No JVD. No lymphadenopathy.  PULMONARY: +ve rales in the R base. No wheezing.   CVS: Normal S1, S2. Rate and Rhythm are regular. No murmurs.  ABDOMEN/GI: Soft, Nontender, Nondistended; BS present  EXTREMITIES: Peripheral pulses intact. No edema B/L LE.  NEUROLOGIC:  No motor or sensory deficit.  PSYCH: Alert & oriented x 3    Consultant(s) Notes Reviewed:  [x ] YES  [ ] NO  Care Discussed with Consultants/Other Providers [ x] YES  [ ] NO    EKG reviewed  Telemetry reviewed    LABS:                        7.1    5.25  )-----------( 222      ( 11 Sep 2023 08:30 )             22.3     09-11    137  |  100  |  43<H>  ----------------------------<  112<H>  3.7   |  22  |  2.5<H>    Ca    7.8<L>      11 Sep 2023 08:30  Mg     1.9     09-11    TPro  5.2<L>  /  Alb  2.6<L>  /  TBili  <0.2  /  DBili  x   /  AST  45<H>  /  ALT  15  /  AlkPhos  100  09-11        Trop 0.15, CKMB --, CK --, 09-10-23 @ 04:30  Trop 0.17, CKMB --, CK --, 09-10-23 @ 00:37  Trop 0.19, CKMB --, CK --, 09-09-23 @ 13:53      Culture - Acid Fast - Sputum w/Smear (collected 10 Sep 2023 03:49)  Source: .Sputum Sputum    Culture - Blood (collected 09 Sep 2023 16:33)  Source: .Blood Blood-Venous  Preliminary Report (12 Sep 2023 04:01):    No growth at 48 Hours    Culture - Blood (collected 09 Sep 2023 16:33)  Source: .Blood Blood-Venous  Preliminary Report (12 Sep 2023 04:01):    No growth at 48 Hours        RADIOLOGY & ADDITIONAL TESTS:    < from: US Kidney and Bladder (09.11.23 @ 21:13) >  IMPRESSION:    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 Otherwise unremarkable study.    Thank you for the courtesy of this consult.      < end of copied text >  < from: [facility] Duplex Lower Ext Vein Scan, Bilat (09.11.23 @ 08:31) >    Impression:    No evidence of deep venous thrombosis or superficial thrombophlebitis in   the bilateral lower extremities.    < end of copied text >  < from: TTE Echo Complete w/ Contrast w/ Doppler (09.11.23 @ 13:10) >  Summary:   1. Endocardial visualization was enhanced with intravenous echo contrast.   2. Left ventricular ejection fraction, by visual estimation, is 70 to   75%.   3. Hyperdynamicglobal left ventricular systolic function.   4. Spectral Doppler shows impaired relaxation pattern of left   ventricular myocardial filling (Grade I diastolic dysfunction).   5. Mild left ventricular hypertrophy.    < end of copied text >  < from: TTE Echo Complete w/ Contrast w/ Doppler (09.11.23 @ 13:10) >  Venous: The inferior vena cava was normal sized, with respiratory size   variation greater than 50%.    < end of copied text >    Imaging or report Personally Reviewed:  [x ] YES  [ ] NO    Medications:  Standing  albuterol    90 MICROgram(s) HFA Inhaler 2 Puff(s) Inhalation every 6 hours  albuterol/ipratropium for Nebulization 3 milliLiter(s) Nebulizer every 6 hours  aspirin  chewable 81 milliGRAM(s) Oral daily  budesonide 160 MICROgram(s)/formoterol 4.5 MICROgram(s) Inhaler 2 Puff(s) Inhalation two times a day  cefepime   IVPB 2000 milliGRAM(s) IV Intermittent every 12 hours  chlorhexidine 2% Cloths 1 Application(s) Topical <User Schedule>  dextrose 5%. 1000 milliLiter(s) IV Continuous <Continuous>  dextrose 5%. 1000 milliLiter(s) IV Continuous <Continuous>  dextrose 50% Injectable 25 Gram(s) IV Push once  dextrose 50% Injectable 25 Gram(s) IV Push once  dextrose 50% Injectable 12.5 Gram(s) IV Push once  furosemide   Injectable 40 milliGRAM(s) IV Push daily  glucagon  Injectable 1 milliGRAM(s) IntraMuscular once  heparin   Injectable 5000 Unit(s) SubCutaneous every 12 hours  influenza  Vaccine (HIGH DOSE) 0.7 milliLiter(s) IntraMuscular once  insulin lispro (ADMELOG) corrective regimen sliding scale   SubCutaneous at bedtime  insulin lispro (ADMELOG) corrective regimen sliding scale   SubCutaneous three times a day before meals  simvastatin 40 milliGRAM(s) Oral at bedtime  tamsulosin 0.4 milliGRAM(s) Oral at bedtime    PRN Meds  acetaminophen     Tablet .. 650 milliGRAM(s) Oral every 6 hours PRN  dextrose Oral Gel 15 Gram(s) Oral once PRN  guaiFENesin  milliGRAM(s) Oral every 12 hours PRN  melatonin 3 milliGRAM(s) Oral at bedtime PRN      Case discussed with resident    Care discussed with pt/family           ROSS DIETRICH  72y Male    CHIEF COMPLAINT:    Patient is a 72y old  Male who presents with a chief complaint of PNA, HF exacerbation (12 Sep 2023 10:59)      INTERVAL HPI/OVERNIGHT EVENTS:    Patient seen and examined. Still having cough with sputum. No fever. Breathing has improved.     ROS: All other systems are negative.    Vital Signs:    T(F): 98.4 (09-11-23 @ 20:09), Max: 98.4 (09-11-23 @ 12:53)  HR: 79 (09-12-23 @ 06:30) (79 - 88)  BP: 141/70 (09-12-23 @ 06:30) (128/60 - 141/70)  RR: 18 (09-12-23 @ 06:30) (18 - 18)  SpO2: --  I&O's Summary    11 Sep 2023 07:01  -  12 Sep 2023 07:00  --------------------------------------------------------  IN: 210 mL / OUT: 0 mL / NET: 210 mL      Daily     Daily   CAPILLARY BLOOD GLUCOSE      POCT Blood Glucose.: 192 mg/dL (12 Sep 2023 11:17)  POCT Blood Glucose.: 103 mg/dL (12 Sep 2023 08:17)  POCT Blood Glucose.: 341 mg/dL (11 Sep 2023 21:37)  POCT Blood Glucose.: 313 mg/dL (11 Sep 2023 16:27)      PHYSICAL EXAM:    GENERAL:  NAD  SKIN: No rashes or lesions  HENT: Atraumatic. Normocephalic. PERRL. Moist membranes.  NECK: Supple, No JVD. No lymphadenopathy.  PULMONARY: +ve rales in the R base. No wheezing.   CVS: Normal S1, S2. Rate and Rhythm are regular. No murmurs.  ABDOMEN/GI: Soft, Nontender, Nondistended; BS present  EXTREMITIES: Peripheral pulses intact. No edema B/L LE.  NEUROLOGIC:  No motor or sensory deficit.  PSYCH: Alert & oriented x 3    Consultant(s) Notes Reviewed:  [x ] YES  [ ] NO  Care Discussed with Consultants/Other Providers [ x] YES  [ ] NO    EKG reviewed  Telemetry reviewed    LABS:                        7.1    5.25  )-----------( 222      ( 11 Sep 2023 08:30 )             22.3     09-11    137  |  100  |  43<H>  ----------------------------<  112<H>   Creatinine Trend: 2.8<--, 2.5<--, 2.6<--, 2.5<--  3.7   |  22  |  2.5<H>    Ca    7.8<L>      11 Sep 2023 08:30  Mg     1.9     09-11    TPro  5.2<L>  /  Alb  2.6<L>  /  TBili  <0.2  /  DBili  x   /  AST  45<H>  /  ALT  15  /  AlkPhos  100  09-11        Trop 0.15, CKMB --, CK --, 09-10-23 @ 04:30  Trop 0.17, CKMB --, CK --, 09-10-23 @ 00:37  Trop 0.19, CKMB --, CK --, 09-09-23 @ 13:53      Culture - Acid Fast - Sputum w/Smear (collected 10 Sep 2023 03:49)  Source: .Sputum Sputum    Culture - Blood (collected 09 Sep 2023 16:33)  Source: .Blood Blood-Venous  Preliminary Report (12 Sep 2023 04:01):    No growth at 48 Hours    Culture - Blood (collected 09 Sep 2023 16:33)  Source: .Blood Blood-Venous  Preliminary Report (12 Sep 2023 04:01):    No growth at 48 Hours        RADIOLOGY & ADDITIONAL TESTS:    < from: US Kidney and Bladder (09.11.23 @ 21:13) >  IMPRESSION:    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 Otherwise unremarkable study.    Thank you for the courtesy of this consult.      < end of copied text >  < from: VA Duplex Lower Ext Vein Scan, Bilat (09.11.23 @ 08:31) >    Impression:    No evidence of deep venous thrombosis or superficial thrombophlebitis in   the bilateral lower extremities.    < end of copied text >  < from: TTE Echo Complete w/ Contrast w/ Doppler (09.11.23 @ 13:10) >  Summary:   1. Endocardial visualization was enhanced with intravenous echo contrast.   2. Left ventricular ejection fraction, by visual estimation, is 70 to   75%.   3. Hyperdynamicglobal left ventricular systolic function.   4. Spectral Doppler shows impaired relaxation pattern of left   ventricular myocardial filling (Grade I diastolic dysfunction).   5. Mild left ventricular hypertrophy.    < end of copied text >  < from: TTE Echo Complete w/ Contrast w/ Doppler (09.11.23 @ 13:10) >  Venous: The inferior vena cava was normal sized, with respiratory size   variation greater than 50%.    < end of copied text >  < from: Xray Chest 1 View- PORTABLE-Routine (Xray Chest 1 View- PORTABLE-Routine .) (09.12.23 @ 14:15) >    IMPRESSION:    No radiographic evidence of acute cardiopulmonary disease.    Previous right basilar pleural-parenchymal opacity resolved.    < end of copied text >    Imaging or report Personally Reviewed:  [x ] YES  [ ] NO    Medications:  Standing  albuterol    90 MICROgram(s) HFA Inhaler 2 Puff(s) Inhalation every 6 hours  albuterol/ipratropium for Nebulization 3 milliLiter(s) Nebulizer every 6 hours  aspirin  chewable 81 milliGRAM(s) Oral daily  budesonide 160 MICROgram(s)/formoterol 4.5 MICROgram(s) Inhaler 2 Puff(s) Inhalation two times a day  cefepime   IVPB 2000 milliGRAM(s) IV Intermittent every 12 hours  chlorhexidine 2% Cloths 1 Application(s) Topical <User Schedule>  dextrose 5%. 1000 milliLiter(s) IV Continuous <Continuous>  dextrose 5%. 1000 milliLiter(s) IV Continuous <Continuous>  dextrose 50% Injectable 25 Gram(s) IV Push once  dextrose 50% Injectable 25 Gram(s) IV Push once  dextrose 50% Injectable 12.5 Gram(s) IV Push once  furosemide   Injectable 40 milliGRAM(s) IV Push daily  glucagon  Injectable 1 milliGRAM(s) IntraMuscular once  heparin   Injectable 5000 Unit(s) SubCutaneous every 12 hours  influenza  Vaccine (HIGH DOSE) 0.7 milliLiter(s) IntraMuscular once  insulin lispro (ADMELOG) corrective regimen sliding scale   SubCutaneous at bedtime  insulin lispro (ADMELOG) corrective regimen sliding scale   SubCutaneous three times a day before meals  simvastatin 40 milliGRAM(s) Oral at bedtime  tamsulosin 0.4 milliGRAM(s) Oral at bedtime    PRN Meds  acetaminophen     Tablet .. 650 milliGRAM(s) Oral every 6 hours PRN  dextrose Oral Gel 15 Gram(s) Oral once PRN  guaiFENesin  milliGRAM(s) Oral every 12 hours PRN  melatonin 3 milliGRAM(s) Oral at bedtime PRN      Case discussed with resident    Care discussed with pt/family

## 2023-09-13 ENCOUNTER — TRANSCRIPTION ENCOUNTER (OUTPATIENT)
Age: 73
End: 2023-09-13

## 2023-09-13 VITALS
DIASTOLIC BLOOD PRESSURE: 72 MMHG | SYSTOLIC BLOOD PRESSURE: 162 MMHG | OXYGEN SATURATION: 98 % | TEMPERATURE: 96 F | HEART RATE: 84 BPM | RESPIRATION RATE: 18 BRPM

## 2023-09-13 LAB
ALBUMIN SERPL ELPH-MCNC: 2.8 G/DL — LOW (ref 3.5–5.2)
ALP SERPL-CCNC: 89 U/L — SIGNIFICANT CHANGE UP (ref 30–115)
ALT FLD-CCNC: 23 U/L — SIGNIFICANT CHANGE UP (ref 0–41)
ANION GAP SERPL CALC-SCNC: 12 MMOL/L — SIGNIFICANT CHANGE UP (ref 7–14)
AST SERPL-CCNC: 69 U/L — HIGH (ref 0–41)
BILIRUB SERPL-MCNC: <0.2 MG/DL — SIGNIFICANT CHANGE UP (ref 0.2–1.2)
BUN SERPL-MCNC: 48 MG/DL — HIGH (ref 10–20)
CALCIUM SERPL-MCNC: 8.2 MG/DL — LOW (ref 8.4–10.5)
CHLORIDE SERPL-SCNC: 104 MMOL/L — SIGNIFICANT CHANGE UP (ref 98–110)
CO2 SERPL-SCNC: 22 MMOL/L — SIGNIFICANT CHANGE UP (ref 17–32)
CREAT SERPL-MCNC: 2.5 MG/DL — HIGH (ref 0.7–1.5)
EGFR: 26 ML/MIN/1.73M2 — LOW
FERRITIN SERPL-MCNC: 153 NG/ML — SIGNIFICANT CHANGE UP (ref 30–400)
GLUCOSE BLDC GLUCOMTR-MCNC: 115 MG/DL — HIGH (ref 70–99)
GLUCOSE BLDC GLUCOMTR-MCNC: 177 MG/DL — HIGH (ref 70–99)
GLUCOSE SERPL-MCNC: 116 MG/DL — HIGH (ref 70–99)
HCT VFR BLD CALC: 24.6 % — LOW (ref 42–52)
HGB BLD-MCNC: 7.7 G/DL — LOW (ref 14–18)
MAGNESIUM SERPL-MCNC: 1.8 MG/DL — SIGNIFICANT CHANGE UP (ref 1.8–2.4)
MCHC RBC-ENTMCNC: 27 PG — SIGNIFICANT CHANGE UP (ref 27–31)
MCHC RBC-ENTMCNC: 31.3 G/DL — LOW (ref 32–37)
MCV RBC AUTO: 86.3 FL — SIGNIFICANT CHANGE UP (ref 80–94)
NRBC # BLD: 0 /100 WBCS — SIGNIFICANT CHANGE UP (ref 0–0)
PLATELET # BLD AUTO: 324 K/UL — SIGNIFICANT CHANGE UP (ref 130–400)
PMV BLD: 9.5 FL — SIGNIFICANT CHANGE UP (ref 7.4–10.4)
POTASSIUM SERPL-MCNC: 4.2 MMOL/L — SIGNIFICANT CHANGE UP (ref 3.5–5)
POTASSIUM SERPL-SCNC: 4.2 MMOL/L — SIGNIFICANT CHANGE UP (ref 3.5–5)
PROT SERPL-MCNC: 5.5 G/DL — LOW (ref 6–8)
RBC # BLD: 2.85 M/UL — LOW (ref 4.7–6.1)
RBC # FLD: 13.8 % — SIGNIFICANT CHANGE UP (ref 11.5–14.5)
SODIUM SERPL-SCNC: 138 MMOL/L — SIGNIFICANT CHANGE UP (ref 135–146)
WBC # BLD: 5.29 K/UL — SIGNIFICANT CHANGE UP (ref 4.8–10.8)
WBC # FLD AUTO: 5.29 K/UL — SIGNIFICANT CHANGE UP (ref 4.8–10.8)

## 2023-09-13 PROCEDURE — 99239 HOSP IP/OBS DSCHRG MGMT >30: CPT

## 2023-09-13 RX ORDER — FUROSEMIDE 40 MG
1 TABLET ORAL
Refills: 0 | DISCHARGE

## 2023-09-13 RX ORDER — ALBUTEROL 90 UG/1
2 AEROSOL, METERED ORAL EVERY 6 HOURS
Refills: 0 | Status: DISCONTINUED | OUTPATIENT
Start: 2023-09-13 | End: 2023-09-13

## 2023-09-13 RX ORDER — BUDESONIDE AND FORMOTEROL FUMARATE DIHYDRATE 160; 4.5 UG/1; UG/1
2 AEROSOL RESPIRATORY (INHALATION)
Refills: 0 | Status: DISCONTINUED | OUTPATIENT
Start: 2023-09-13 | End: 2023-09-13

## 2023-09-13 RX ORDER — BUDESONIDE AND FORMOTEROL FUMARATE DIHYDRATE 160; 4.5 UG/1; UG/1
2 AEROSOL RESPIRATORY (INHALATION)
Qty: 1 | Refills: 0
Start: 2023-09-13 | End: 2023-10-12

## 2023-09-13 RX ORDER — FUROSEMIDE 40 MG
1 TABLET ORAL
Qty: 0 | Refills: 0 | DISCHARGE
Start: 2023-09-13

## 2023-09-13 RX ADMIN — HEPARIN SODIUM 5000 UNIT(S): 5000 INJECTION INTRAVENOUS; SUBCUTANEOUS at 05:35

## 2023-09-13 RX ADMIN — CEFEPIME 100 MILLIGRAM(S): 1 INJECTION, POWDER, FOR SOLUTION INTRAMUSCULAR; INTRAVENOUS at 05:35

## 2023-09-13 RX ADMIN — BUDESONIDE AND FORMOTEROL FUMARATE DIHYDRATE 2 PUFF(S): 160; 4.5 AEROSOL RESPIRATORY (INHALATION) at 10:34

## 2023-09-13 RX ADMIN — Medication 1: at 12:09

## 2023-09-13 RX ADMIN — CHLORHEXIDINE GLUCONATE 1 APPLICATION(S): 213 SOLUTION TOPICAL at 05:34

## 2023-09-13 RX ADMIN — Medication 81 MILLIGRAM(S): at 11:42

## 2023-09-13 NOTE — DISCHARGE NOTE PROVIDER - NSDCCPCAREPLAN_GEN_ALL_CORE_FT
PRINCIPAL DISCHARGE DIAGNOSIS  Diagnosis: Bronchitis  Assessment and Plan of Treatment: You were evaluated in the hospital for your cough, found to have bronchitis.  Coughing is a reflex that clears your throat and your airways. Coughing helps to heal and protect your lungs. It is normal to cough occasionally, but a cough that happens with other symptoms or lasts a long time may be a sign of a condition that needs treatment. Coughing may be caused by infections, asthma or COPD, smoking, postnasal drip, gastroesophageal reflux, as well as other medical conditions. Take medicines only as instructed by your health care provider. Avoid environments or triggers that causes you to cough at work or at home.  After discharge, you will need to:   - Follow up with your primary care doctor within 1-2 weeks  - Follow up with cardiology within 1 month  - Take all the medications you were discharged with, unless otherwise instructed by your healthcare provider(s).   Please follow up with your providers by calling them to make an appointment so that you can see them in 1-2weeks; bring your paperwork from this hospital stay to that visit. You can access your visit information by signing up for an account for the patient portal at [email or web address]   Seek immediate medical attention if you develop fevers, chills, chest pain, shortness of breath, nausea and vomiting, abdominal pain, passing out, weakness or numbness or tingling on one side of your body, or any other concerning signs or symptoms.         SECONDARY DISCHARGE DIAGNOSES  Diagnosis: MAXX (acute kidney injury)  Assessment and Plan of Treatment:     Diagnosis: Elevated troponin  Assessment and Plan of Treatment:      PRINCIPAL DISCHARGE DIAGNOSIS  Diagnosis: Bronchitis  Assessment and Plan of Treatment: You were evaluated in the hospital for your cough, found to have bronchitis and CHF  Coughing is a reflex that clears your throat and your airways. Coughing helps to heal and protect your lungs. It is normal to cough occasionally, but a cough that happens with other symptoms or lasts a long time may be a sign of a condition that needs treatment. Coughing may be caused by infections, asthma or COPD, smoking, postnasal drip, gastroesophageal reflux, as well as other medical conditions. Take medicines only as instructed by your health care provider. Avoid environments or triggers that causes you to cough at work or at home.  After discharge, you will need to:   - Follow up with your primary care doctor within 1-2 weeks  - Follow up with cardiology within 1 week  - Take all the medications you were discharged with, unless otherwise instructed by your healthcare provider(s).   Please follow up with your providers by calling them to make an appointment so that you can see them in 1-2weeks; bring your paperwork from this hospital stay to that visit. You can access your visit information by signing up for an account for the patient portal at [email or web address]   Seek immediate medical attention if you develop fevers, chills, chest pain, shortness of breath, nausea and vomiting, abdominal pain, passing out, weakness or numbness or tingling on one side of your body, or any other concerning signs or symptoms.         SECONDARY DISCHARGE DIAGNOSES  Diagnosis: MAXX (acute kidney injury)  Assessment and Plan of Treatment:     Diagnosis: Elevated troponin  Assessment and Plan of Treatment:

## 2023-09-13 NOTE — DISCHARGE NOTE PROVIDER - NSDCMRMEDTOKEN_GEN_ALL_CORE_FT
albuterol 90 mcg/inh inhalation aerosol: 1 puff(s) inhaled every 8 hours As needed Shortness of Breath and/or Wheezing  aspirin 81 mg oral tablet: 1 tab(s) orally once a day  Breztri Aerosphere 160 mcg-9 mcg-4.8 mcg/inh inhalation aerosol: 2 puff(s) inhaled once a day  Lasix 20 mg oral tablet: 1 tab(s) orally once a day  losartan 25 mg oral tablet: 1 tab(s) orally once a day  pioglitazone 30 mg oral tablet: 1 tab(s) orally once a day  simvastatin 40 mg oral tablet: 1 tab(s) orally once a day (at bedtime)  Synjardy 12.5 mg-1000 mg oral tablet: 1 tab(s) orally 2 times a day  Trulicity Pen 1.5 mg/0.5 mL subcutaneous solution:    albuterol 90 mcg/inh inhalation aerosol: 1 puff(s) inhaled every 8 hours As needed Shortness of Breath and/or Wheezing  aspirin 81 mg oral tablet: 1 tab(s) orally once a day  Breztri Aerosphere 160 mcg-9 mcg-4.8 mcg/inh inhalation aerosol: 2 puff(s) inhaled once a day  Lasix 20 mg oral tablet: 1 tab(s) orally once a day  losartan 25 mg oral tablet: 1 tab(s) orally once a day  pioglitazone 30 mg oral tablet: 1 tab(s) orally once a day  simvastatin 40 mg oral tablet: 1 tab(s) orally once a day (at bedtime)  Symbicort 80 mcg-4.5 mcg/inh inhalation aerosol: 2 puff(s) inhaled 2 times a day inhaled  Synjardy 12.5 mg-1000 mg oral tablet: 1 tab(s) orally 2 times a day  Trulicity Pen 1.5 mg/0.5 mL subcutaneous solution:

## 2023-09-13 NOTE — DISCHARGE NOTE NURSING/CASE MANAGEMENT/SOCIAL WORK - PATIENT PORTAL LINK FT
You can access the FollowMyHealth Patient Portal offered by [facility] by registering at the following website: [email or web address]. By joining [employer]’s FollowMyHealth portal, you will also be able to view your health information using other applications (apps) compatible with our system.

## 2023-09-13 NOTE — DISCHARGE NOTE PROVIDER - PROVIDER TOKENS
PROVIDER:[TOKEN:[[id number]],FOLLOWUP:[2 weeks],ESTABLISHEDPATIENT:[T]],PROVIDER:[TOKEN:[[id number]],FOLLOWUP:[1 month]] PROVIDER:[TOKEN:[[id number]],FOLLOWUP:[2 weeks],ESTABLISHEDPATIENT:[T]],PROVIDER:[TOKEN:[[id number]],FOLLOWUP:[1 week]]

## 2023-09-13 NOTE — PROGRESS NOTE ADULT - REASON FOR ADMISSION
PNA, HF exacerbation

## 2023-09-13 NOTE — PROGRESS NOTE ADULT - ASSESSMENT
73 y/o man w PMHx of HTN, HLD, NIDDM, HFpEF w G1DD, CKD 3a w Cr 1.6, h/o latent TB s/p treatment ~6/2023, follicular lymphoma of stomach dx 2018 now follows Dr Reese Tirado @ [facility] [phone], on Rituximab since 8/17/23 w last dose 9/7/23 which pt tolerated well, and Revlimid which pt started 9/1/23 and last dose 9/7/23, as pt developed cough and epistaxis and was told by Dr Tirado to d/c Revlimid for now, has had SOB since 9/1/23 and productive cough since 9/5/23    NO PNA  Acute bronchitis  Acute HFpEF  DM-2 / HTN / DL  MAXX on CKD-3  Follicular Lymphoma of the stomach   Normocytic Anemia                PLAN:    ·	Tele reviewed. No events  ·	Cardiology eval noted. Recommended out pt f/u  ·	Repeat CXR noted. RLL opacity has resolved. Likely it was fluid. No PNA  ·	Troponin are elevated but trending down.   ·	Pro BNP is 2123  ·	ECHO showed EF is 70-75%. IVC was normal   ·	Blood cxs x 2 are negative.   ·	Admission CXR showed stable to minimally increased basal pleural-parenchymal opacity  ·	Serum Procalcitonin is 0.17 which is negative. Unlikely PNA  ·	MRSA PCR is negative  ·	D/C Abx  ·	Renal/bladder US showed 2 cm upper pole R angiomyolipoma. No hydronephrosis  ·	Cr is back to baseline. His baseline likely is 2.5  ·	Low salt diet and water restriction to 1.5 L/D  ·	On d/c cont his home dose of Lasix 20 mg po daily  ·	Cont his home meds for DM-2    * Med rec reviewed. Plan of care d/w the pt. Time spent 36 minutes.

## 2023-09-13 NOTE — DISCHARGE NOTE PROVIDER - CARE PROVIDER_API CALL
Kelsie Lopez  Family Medicine  [address] [place] [address]  Phone: [phone]  Fax: [phone]  Established Patient  Follow Up Time: 2 weeks    Behuria, Supreeti  Cardiology  [address] [place] [id number]  Phone: [phone]  Fax: [phone]  Follow Up Time: 1 month   Kelsie Lopez  Family Medicine  [address] [place] [address]  Phone: [phone]  Fax: [phone]  Established Patient  Follow Up Time: 2 weeks    Behuria, Supreeti  Cardiology  [address] [place] [id number]  Phone: [phone]  Fax: [phone]  Follow Up Time: 1 week

## 2023-09-13 NOTE — DISCHARGE NOTE NURSING/CASE MANAGEMENT/SOCIAL WORK - NSDCFUADDAPPT_GEN_ALL_CORE_FT
APPTS ARE READY TO BE MADE: [X ] YES    Best Family or Patient Contact (if needed):[phone]    Additional Information about above appointments (if needed):    1: Dr. Supreeti Behuria within 1 week of discharge      Other comments or requests:

## 2023-09-13 NOTE — DISCHARGE NOTE PROVIDER - PROVIDER RX CONTACT NUMBER
[phone] 04-29    141  |  110<H>  |  9   ----------------------------<  86  3.8   |  26  |  0.52    Ca    8.5      29 Apr 2022 09:10    TPro  6.1  /  Alb  2.2<L>  /  TBili  0.3  /  DBili  x   /  AST  18  /  ALT  24  /  AlkPhos  111  04-29

## 2023-09-13 NOTE — PROGRESS NOTE ADULT - SUBJECTIVE AND OBJECTIVE BOX
ROSS DIETRICH  72y Male    CHIEF COMPLAINT:    Patient is a 72y old  Male who presents with a chief complaint of PNA, HF exacerbation (13 Sep 2023 10:24)      INTERVAL HPI/OVERNIGHT EVENTS:    Patient seen and examined. Feels good. Cough has improved. No sob. No fever.     ROS: All other systems are negative.    Vital Signs:    T(F): 97.4 (23 @ 20:11), Max: 97.4 (23 @ 20:11)  HR: 84 (23 @ 20:11) (84 - 85)  BP: 157/68 (23 @ 20:11) (157/68 - 160/67)  RR: 18 (23 @ 08:32) (18 - 18)  SpO2: 98% (23 @ 08:32) (98% - 98%)  I&O's Summary    12 Sep 2023 07:01  -  13 Sep 2023 07:00  --------------------------------------------------------  IN: 1021 mL / OUT: 1000 mL / NET: 21 mL      Daily     Daily Weight in k.7 (12 Sep 2023 20:11)  CAPILLARY BLOOD GLUCOSE      POCT Blood Glucose.: 115 mg/dL (13 Sep 2023 07:45)  POCT Blood Glucose.: 252 mg/dL (12 Sep 2023 21:40)  POCT Blood Glucose.: 226 mg/dL (12 Sep 2023 16:31)  POCT Blood Glucose.: 192 mg/dL (12 Sep 2023 11:17)      PHYSICAL EXAM:    GENERAL:  NAD  SKIN: No rashes or lesions  HENT: Atraumatic. Normocephalic. PERRL. Moist membranes.  NECK: Supple, No JVD. No lymphadenopathy.  PULMONARY: CTA B/L. No wheezing. No rales  CVS: Normal S1, S2. Rate and Rhythm are regular. No murmurs.  ABDOMEN/GI: Soft, Nontender, Nondistended; BS present  EXTREMITIES: Peripheral pulses intact. No edema B/L LE.  NEUROLOGIC:  No motor or sensory deficit.  PSYCH: Alert & oriented x 3    Consultant(s) Notes Reviewed:  [x ] YES  [ ] NO  Care Discussed with Consultants/Other Providers [ x] YES  [ ] NO    EKG reviewed  Telemetry reviewed    LABS:                        7.7    5.29  )-----------( 324      ( 13 Sep 2023 07:54 )             24.6   Hemoglobin: 7.7 g/dL ( @ 07:54)  Hemoglobin: 7.7 g/dL ( @ 11:40)  Hemoglobin: 7.1 g/dL ( @ 08:30)  Hemoglobin: 7.4 g/dL (09-10 @ 04:30)  Hemoglobin: 7.3 g/dL ( @ 13:53)        138  |  104  |  48<H>  ----------------------------<  116<H>  4.2   |  22  |  2.5<H>    Ca    8.2<L>      13 Sep 2023 07:54  Mg     1.8         TPro  5.5<L>  /  Alb  2.8<L>  /  TBili  <0.2  /  DBili  x   /  AST  69<H>  /  ALT  23  /  AlkPhos  89                RADIOLOGY & ADDITIONAL TESTS:      Imaging or report Personally Reviewed:  [ ] YES  [ ] NO    Medications:  Standing  albuterol    90 MICROgram(s) HFA Inhaler 2 Puff(s) Inhalation every 6 hours  aspirin  chewable 81 milliGRAM(s) Oral daily  budesonide  80 MICROgram(s)/formoterol 4.5 MICROgram(s) Inhaler 2 Puff(s) Inhalation two times a day  budesonide 160 MICROgram(s)/formoterol 4.5 MICROgram(s) Inhaler 2 Puff(s) Inhalation two times a day  chlorhexidine 2% Cloths 1 Application(s) Topical <User Schedule>  dextrose 5%. 1000 milliLiter(s) IV Continuous <Continuous>  dextrose 5%. 1000 milliLiter(s) IV Continuous <Continuous>  dextrose 50% Injectable 25 Gram(s) IV Push once  dextrose 50% Injectable 25 Gram(s) IV Push once  dextrose 50% Injectable 12.5 Gram(s) IV Push once  glucagon  Injectable 1 milliGRAM(s) IntraMuscular once  heparin   Injectable 5000 Unit(s) SubCutaneous every 12 hours  influenza  Vaccine (HIGH DOSE) 0.7 milliLiter(s) IntraMuscular once  insulin lispro (ADMELOG) corrective regimen sliding scale   SubCutaneous three times a day before meals  insulin lispro (ADMELOG) corrective regimen sliding scale   SubCutaneous at bedtime  simvastatin 40 milliGRAM(s) Oral at bedtime    PRN Meds  acetaminophen     Tablet .. 650 milliGRAM(s) Oral every 6 hours PRN  albuterol    90 MICROgram(s) HFA Inhaler 2 Puff(s) Inhalation every 6 hours PRN  dextrose Oral Gel 15 Gram(s) Oral once PRN  guaiFENesin  milliGRAM(s) Oral every 12 hours PRN  melatonin 3 milliGRAM(s) Oral at bedtime PRN      Case discussed with resident    Care discussed with pt/family

## 2023-09-13 NOTE — DISCHARGE NOTE PROVIDER - NSDCFUADDAPPT_GEN_ALL_CORE_FT
APPTS ARE READY TO BE MADE: [X ] YES    Best Family or Patient Contact (if needed):[phone]    Additional Information about above appointments (if needed):    1: Dr. Supreeti Behuria within 1 week of discharge      Other comments or requests:    APPTS ARE READY TO BE MADE: [X ] YES    Best Family or Patient Contact (if needed):[phone]    Additional Information about above appointments (if needed):    1: Dr. Supreeti Behuria within 1 week of discharge      Other comments or requests:     Outreached on 9/13, 9/14, and 9/15, which have been unsuccessful. Will await call back from patient/caregiver to coordinate follow up care.

## 2023-09-13 NOTE — DISCHARGE NOTE NURSING/CASE MANAGEMENT/SOCIAL WORK - NSDCPEFALRISK_GEN_ALL_CORE
For information on Fall & Injury Prevention, visit: [email or web address] OR  [email or web address] OR  https://www.cdc.gov/steadi/patient.html

## 2023-09-13 NOTE — DISCHARGE NOTE PROVIDER - HOSPITAL COURSE
Asya speaking 71 y/o man w PMHx of HTN, HLD, NIDDM, HFpEF w G1DD, CKD 3a w Cr 1.6, h/o latent TB s/p treatment ~6/2023, follicular lymphoma of stomach dx 2018 now follows Dr Reese Tirado @ [facility] [phone], on Rituximab since 8/17/23 w last dose 9/7/23 which pt tolerated well, and Revlimid which pt started 9/1/23 and last dose 9/7/23, as pt developed cough and epistaxis and was told by Dr Tirado to d/c Revlimid for now, has had SOB since 9/1/23 and productive cough since 9/5/23, had outpatient CXR w Dr Tirado which was reportedly unremarkable but when seen by PCP earlier today was told to go to ED for treatment of PNA, in ED found to have RLL opacity and given azithromycin/CTX, Tmax 99.5, WBC 5.98 w neutrophils 4.27, trop 0.19 and BNP 2123, Cr 2.5, admitted to medicine for PNA though likely also has HF exacerbation and needs ACS r/o.   HCP wife Mili Alonzo [phone]  Daughter Oksana Guzman [phone]    Pt reports had been tolerating Rituximab well but after starting Revlimib has been feeling unwell- including SOB, cough, nosebleeds of total 4-5 episodes. Pt and wife have not noted worsening LE edema, but notes has been chronic x1year - recent admission notable for MAXX and LE edema which required Lasix 60mg IV w eventual transition to 40mg PO QD, echo showing G1DD. Has stopped Revlimid w last dose 9/7/23 but no other changes in medications. No known sick contacts. No CP, n/v/d, abd pain. No urinary symptoms. No hematemesis, hematochezia, melena.     Patient was admitted to the medicine floor and as found to be positive for rhinovirus. Patient was treated with antibiotics for suspected pneumonia. Repeat CXR showed improvement of the consolidation seen in prior CXR, and antibiotics were dced as suspicion for pneumonia low and etiology most likely bronchitis. Patient also had issues urinating and bladder scan showed some retention. Patient was started on flomax and issue has resolved. Since admission, patient's symptoms have improved and patient is stable to go home.    #PNA  #COPD   #SOB, Productive cough   SIRS (-) but has likely source, is immunocompromised, may not be able to mount full immune response.   DDx PNA most likely given RLL opacity, vs URI vs COPD exacerbation/bronchitis vs less likely TB reactivation in pt who hs already been treated for TB prior to starting chemo. Overlap HF exacerbation as well.   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 Otherwise unremarkable study.  - repeat CXR showed improvement from admission, unlikely pneumonia, abx dced, likely bronchitis    #HFpEF w G1DD  #HF exacerbation  #elevated trop   Significant crackles on exam, BLE edema pitting 2+  DDx HF exacerbation, but will need to evaluate for ACS as well. Trop may be elevated 2/2 MAXX vs demand as well.   - trop 0.19 from baseline 0.04, BNP 2123 w prior 600 on recent admission   - EKG on admission w/o changes, trend for EKG changes   - home Lasix 20mg PO QD> continue as 40mg IV QD for now   - Strict I&Os, daily weight  - 9/11 duplex: no evidence of DVT or superficial thrombophlebitis in b/l LE  - f/u cardio for elevated trops  - Elevated troponin likely type II MI.   - No further in patient workup from cardiology  - Outpatient follow up with cardiology   - 9/11 echo:  < from: TTE Echo Complete w/ Contrast w/ Doppler (09.11.23 @ 13:10) >  1. Endocardial visualization was enhanced with intravenous echo contrast.   2. Left ventricular ejection fraction, by visual estimation, is 70 to   75%.   3. Hyperdynamicglobal left ventricular systolic function.   4. Spectral Doppler shows impaired relaxation pattern of left   ventricular myocardial filling (Grade I diastolic dysfunction).   5. Mild left ventricular hypertrophy.    #Urinary Retention poss 2/2 BPH? - resolved  - 9/12 bladder scan: 500 cc, s/p straight cath  - stared on flomax 0.4mg     #MAXX on CKD 3a w baseline Cr 1.6  Cr 2.5 on admission  - Hold home Losartan   - Increased Lasix as noted above, but may start nifedipine as needed for goal -140s  - Consult Nephro if worsening CKD/need for HD  - Low K/phos diet     #Hypocalcemia, mild  Ca 7.9, has had similar values in the past but family endorses spasms of BUE. Will give 1g Calcium gluconate as this may be symptom of hypocalcemia. [place]vostek sign negative.   - 1g Ca gluconate   - EKG reviewed    #Anemia, normocytic   #h/o SHAI   - s/p iron infusions x5 on recent admission  - recent epistaxis which resolved after d/c revlimid   - no further bleeding episodes    #HTN - hold Losartan given MAXX   #HLD - cont statin   #NIDDM - On oral antidiabetics at home - insulin regimen while admitted     #Follicular lymphoma of stomach dx 2018   now follows Dr Reese Tirado @ [facility] [phone], on Rituximab since 8/17/23 w last dose 9/7/23 which pt tolerated well, and Revlimid which pt started 9/1/23 and last dose 9/7/23

## 2023-09-15 LAB
CULTURE RESULTS: SIGNIFICANT CHANGE UP
CULTURE RESULTS: SIGNIFICANT CHANGE UP
SPECIMEN SOURCE: SIGNIFICANT CHANGE UP
SPECIMEN SOURCE: SIGNIFICANT CHANGE UP

## 2023-09-18 DIAGNOSIS — E78.5 HYPERLIPIDEMIA, UNSPECIFIED: ICD-10-CM

## 2023-09-18 DIAGNOSIS — I50.33 ACUTE ON CHRONIC DIASTOLIC (CONGESTIVE) HEART FAILURE: ICD-10-CM

## 2023-09-18 DIAGNOSIS — I13.0 HYPERTENSIVE HEART AND CHRONIC KIDNEY DISEASE WITH HEART FAILURE AND STAGE 1 THROUGH STAGE 4 CHRONIC KIDNEY DISEASE, OR UNSPECIFIED CHRONIC KIDNEY DISEASE: ICD-10-CM

## 2023-09-18 DIAGNOSIS — R33.9 RETENTION OF URINE, UNSPECIFIED: ICD-10-CM

## 2023-09-18 DIAGNOSIS — E83.51 HYPOCALCEMIA: ICD-10-CM
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
## 2023-09-18 DIAGNOSIS — D84.9 IMMUNODEFICIENCY, UNSPECIFIED: ICD-10-CM

## 2023-09-18 DIAGNOSIS — Z92.21 PERSONAL HISTORY OF ANTINEOPLASTIC CHEMOTHERAPY: ICD-10-CM

## 2023-09-18 DIAGNOSIS — C82.93 FOLLICULAR LYMPHOMA, UNSPECIFIED, INTRA-ABDOMINAL LYMPH NODES: ICD-10-CM

## 2023-09-18 DIAGNOSIS — N18.31 CHRONIC KIDNEY DISEASE, STAGE 3A: ICD-10-CM

## 2023-09-18 DIAGNOSIS — D17.9 BENIGN LIPOMATOUS NEOPLASM, UNSPECIFIED: ICD-10-CM

## 2023-09-18 DIAGNOSIS — Z79.82 LONG TERM (CURRENT) USE OF ASPIRIN: ICD-10-CM

## 2023-09-18 DIAGNOSIS — J44.0 CHRONIC OBSTRUCTIVE PULMONARY DISEASE WITH ACUTE LOWER RESPIRATORY INFECTION: ICD-10-CM

## 2023-09-18 DIAGNOSIS — I21.A1 MYOCARDIAL INFARCTION TYPE 2: ICD-10-CM

## 2023-09-18 DIAGNOSIS — J20.9 ACUTE BRONCHITIS, UNSPECIFIED: ICD-10-CM

## 2023-09-18 DIAGNOSIS — E11.22 TYPE 2 DIABETES MELLITUS WITH DIABETIC CHRONIC KIDNEY DISEASE: ICD-10-CM

## 2023-09-18 DIAGNOSIS — B34.8 OTHER VIRAL INFECTIONS OF UNSPECIFIED SITE: ICD-10-CM

## 2023-09-18 DIAGNOSIS — D64.9 ANEMIA, UNSPECIFIED: ICD-10-CM

## 2023-09-18 DIAGNOSIS — R04.0 EPISTAXIS: ICD-10-CM

## 2023-09-18 DIAGNOSIS — N17.9 ACUTE KIDNEY FAILURE, UNSPECIFIED: ICD-10-CM

## 2023-09-18 DIAGNOSIS — Z87.891 PERSONAL HISTORY OF NICOTINE DEPENDENCE: ICD-10-CM

## 2023-09-18 DIAGNOSIS — R77.8 OTHER SPECIFIED ABNORMALITIES OF PLASMA PROTEINS: ICD-10-CM

## 2023-09-18 DIAGNOSIS — T45.1X5A ADVERSE EFFECT OF ANTINEOPLASTIC AND IMMUNOSUPPRESSIVE DRUGS, INITIAL ENCOUNTER: ICD-10-CM

## 2023-10-17 NOTE — ED ADULT TRIAGE NOTE - AS TEMP SITE
"Patient Education       Attention Deficit Hyperactivity Disorder (ADHD) in Children   The Basics   Written by the doctors and editors at [facility]   What is ADHD? -- ADHD is a condition that can make it hard to sit still, pay attention, or make good decisions. ADHD often begins in childhood. ADHD can cause a child to have trouble in school, at home, or with friends. ADHD is more common in males than females. ADHD stands for "attention deficit hyperactivity disorder." Some people call it just ADD (attention deficit disorder).  There is no cure for ADHD, but different treatments can help improve a child's symptoms and behavior.  What are the symptoms of ADHD? -- Children with ADHD have 1 or more of the following symptoms:  Increased activity, also called "hyperactivity" - A child might have trouble sitting still or playing quietly.  Poor decision-making - A child might interrupt others or do things without thinking them through.  Trouble paying attention - A child might be forgetful, lose things, or have trouble finishing a project.  Symptoms often begin by the time a child is 4 years old and can change over time. Children often continue to have symptoms as teenagers or adults.  Is there a test for ADHD? -- No. There is no test. If you suspect your child has ADHD, talk to your child's doctor or nurse. They will ask about your child's symptoms and behavior at home and at school. To find out about your child's behavior at school, you will need to ask their teacher.  A doctor can make a diagnosis of ADHD only if a child's symptoms:  Are seen in more than 1 place, for example, both at home and in school  Last at least 6 months  Start before age 12  Affect their friendships or school work  Other conditions can cause symptoms similar to ADHD. For example, children who have trouble learning to read can also have a tough time in school. Your child's doctor or nurse will try to figure out what is causing your child's symptoms. " "But this might involve a few visits to the doctor.  Is ADHD a condition that needs to be treated? -- Most doctors recommend that ADHD be treated. Children with untreated ADHD are more likely than children whose ADHD is treated to have a hard time in school, become depressed, or have accidents.  How is ADHD treated? -- ADHD can be treated in different ways. Treatment can improve symptoms and help children do better at school, at home, and with friends. Children with ADHD might have 1 or more of the following treatments:  Medicines - Doctors can prescribe different medicines to help children pay attention and concentrate better. ADHD medicines are often very effective at improving the condition, but they can cause side effects. Tell your doctor if your child has any problems while taking ADHD medicine. Some children need to try more than 1 medicine to figure out which is right for them.  Behavior treatment - You might find that you can improve your child's behavior by making changes at home. For instance, you can make a checklist for your child to use every morning so they remember what to do. Or you can have your child keep homework in the same place so they don't lose it.  Changes at school - Teachers can make changes in the classroom to help children with ADHD do better in school. For example, a teacher might write down what the homework is every day so the child does not forget. Or a teacher might allow a child to have extra time to finish school work. You will need to work with the teacher and school to create a "school plan" that is right for your child. Keep in mind that a school plan might need to change over time as the child gets older or if their symptoms change.  Some children with ADHD have other problems, too. These can include problems with learning, anxiety, or trouble sleeping. It's important to work with your child's doctor to treat these problems if needed. Sometimes, this can even help improve ADHD " symptoms.  You might hear or read about treatments for ADHD that include things like special vitamins or diets. Experts do not know if these help improve symptoms. Check with a doctor before trying any of these treatments.  Can adults be diagnosed with ADHD? -- Yes. ADHD can run in families. Some adults figure out that they have ADHD only after their child is diagnosed with it. ADHD can also cause adults to have trouble at work or with relationships.  If you are an adult and suspect that you have ADHD, talk with your doctor or nurse about treatment. Some people also find it helpful to talk to a counselor or go to a self-help group to learn ways to manage symptoms.  All topics are updated as new evidence becomes available and our peer review process is complete.  This topic retrieved from [employer] on: Sep 21, 2021.  Topic 43766 Version 11.0  Release: 29.4.2 - C29.263  © 2021 UpToDate, Inc. and/or its affiliates. All rights reserved.  Consumer Information Use and Disclaimer   This information is not specific medical advice and does not replace information you receive from your health care provider. This is only a brief summary of general information. It does NOT include all information about conditions, illnesses, injuries, tests, procedures, treatments, therapies, discharge instructions or life-style choices that may apply to you. You must talk with your health care provider for complete information about your health and treatment options. This information should not be used to decide whether or not to accept your health care provider's advice, instructions or recommendations. Only your health care provider has the knowledge and training to provide advice that is right for you. The use of this information is governed by the [employer] End User License Agreement, available at https://www."[employer]/en/solutions/homedeco2u/about/laci.The use of [employer] content is governed by the [employer] Terms of Use. ©2021  "UpToDate, Inc. All rights reserved.  Copyright   © 2021 UpToDate, Inc. and/or its affiliates. All rights reserved.  Patient Education       Medicines for Attention Deficit Hyperactivity Disorder (ADHD)   The Basics   Written by the doctors and editors at [employer]   What do ADHD medicines do? -- These medicines help children with ADHD pay attention and concentrate better. The most common medicines to treat ADHD are called "stimulants." Stimulants do not cause children to be more active or excited. Instead, these medicines help different parts of the brain to work together.  Does my child need medicines for ADHD? -- Some parents wonder whether their child needs medicine for ADHD. If you are wondering about this, you should discuss it with your child's doctor. Many studies show that ADHD medicines are very good at helping children with ADHD to pay attention and concentrate better.  Medicines to treat ADHD -- There are 2 main kinds of medicines to treat ADHD: stimulants and nonstimulants. Stimulants work faster and cost less than nonstimulants. But some children get side effects from stimulants, so they cannot take them. Plus, children with certain medical problems should not take stimulants. Your doctor or nurse will work with you to choose the safest medicine for your child.  Methylphenidate (sample brand names: Ritalin, Methylin) - These are stimulant medicines and are given as a tablet, capsule, or liquid. They come in different formulas that work on the body in different ways. "Short-acting" formulas are usually started with 1 dose per day. They later go up to 2 doses per day. "Long-acting formulas" are usually given as 1 dose per day. A child can also get a methylphenidate patch (brand name: Daytrana) instead of taking the medicine by mouth. The child wears the patch on the skin for up to 9 hours per day.  Amphetamines (sample brand names: Dexedrine, Adderall,Vyvanse) - These are different types of stimulant " medicines that also come in short-acting and long-acting formulas.  Atomoxetine (brand name: Strattera) - This is a non-stimulant medicine that a child can take if they should not take stimulants. Atomoxetine comes as a capsule that is usually taken 1 or 2 times per day.  How soon will I notice a change in my child's behavior? -- Many stimulants start to work in 30 to 40 minutes. But doctors often start children on a low dose, which might be too small to make a difference in your child's behavior. Your child's nurse or doctor will tell you if you should give your child a higher dose.  If your child takes atomoxetine, it will probably take at least 1 week before you notice changes in your child's behavior.  What if my child needs to take ADHD medicine at school? -- If your child needs to take medicine at school, you should give the school nurse or staff member a separate bottle of your child's medicine. That way, they can give your child a dose at the right time. Do not let your child keep the medicine in their school bag or desk.  What if my child has side effects? -- Some of the most common side effects include:  Not feeling hungry  Trouble sleeping  Weight loss  Most of these side effects are mild and go away after a few weeks. Some can be avoided by changing the way the medicine is given. Rarely, ADHD medicines can have more serious side effects. Your child's doctor or nurse will discuss these with you before your child starts the medicine.  For more detailed information about your medicines, ask your doctor or nurse for the patient hand-out from [employer] available through [employer]. It explains how to use each medicine, describes its possible side effects, and lists other medicines or foods that can affect how it works.  All topics are updated as new evidence becomes available and our peer review process is complete.  This topic retrieved from [employer] on: Sep 21, 2021.  Topic 09414 Version 10.0  Release: 29.4.2  - C29.263  © 2021 UpToDate, Inc. and/or its affiliates. All rights reserved.  Consumer Information Use and Disclaimer   This information is not specific medical advice and does not replace information you receive from your health care provider. This is only a brief summary of general information. It does NOT include all information about conditions, illnesses, injuries, tests, procedures, treatments, therapies, discharge instructions or life-style choices that may apply to you. You must talk with your health care provider for complete information about your health and treatment options. This information should not be used to decide whether or not to accept your health care provider's advice, instructions or recommendations. Only your health care provider has the knowledge and training to provide advice that is right for you. The use of this information is governed by the [employer] End User License Agreement, available at https://www.[employer]/en/solutions/[employer]/about/laci.The use of [employer] content is governed by the [employer] Terms of Use. ©2021 UpToDate, Inc. All rights reserved.  Copyright   © 2021 UpToDate, Inc. and/or its affiliates. All rights reserved.     oral

## 2023-12-05 NOTE — DISCHARGE NOTE PROVIDER - DISCHARGING ATTENDING PHYSICIAN:
89 y/o M w/ PMH fo dementia, HTN, has loop recorder, BPH w/ chronic obrien presents with syncope and found to have UTI    Patient seen and examined at bedside.   Consulted palliative care for consultation  UCx returned - antibiotics changed  BCx negative x 72 hrs      Physical exam:  General: patient in no significant distress, resting comfortably  Head:  Atraumatic, Normocephalic  Eyes: clear sclera  Neck: Supple, thyroid nontender,   Cardio: S1/S2 +ve  Resp: clear to ausculation bilaterally  GI: abdomen soft, nontender, non distended, no guarding, BS +ve x 4  Ext: no significant pedal edema  Neuro: disoriented, responsive to pain.  .  Skin: sacral decubitus ulcer +ve       Recent Vitals  Vital Signs Last 24 Hrs  T(C): 36.4 (05 Dec 2023 16:10), Max: 37.2 (05 Dec 2023 04:00)  T(F): 97.6 (05 Dec 2023 16:10), Max: 98.9 (05 Dec 2023 04:00)  HR: 92 (05 Dec 2023 16:10) (86 - 99)  BP: 149/67 (05 Dec 2023 16:10) (133/70 - 149/67)  BP(mean): --  RR: 18 (05 Dec 2023 16:10) (16 - 18)  SpO2: 97% (05 Dec 2023 16:10) (95% - 98%)    Parameters below as of 05 Dec 2023 16:10  Patient On (Oxygen Delivery Method): nasal cannula  O2 Flow (L/min): 1                          9.2    7.68  )-----------( 235      ( 04 Dec 2023 06:18 )             28.2     12-04    139  |  108  |  46<H>  ----------------------------<  95  3.5   |  20<L>  |  2.18<H>    Ca    8.8      04 Dec 2023 06:18  Phos  3.8     12-04  Mg     2.1     12-04    TPro  7.8  /  Alb  2.0<L>  /  TBili  0.4  /  DBili  x   /  AST  15  /  ALT  14  /  AlkPhos  39<L>  12-03      LIVER FUNCTIONS - ( 03 Dec 2023 07:30 )  Alb: 2.0 g/dL / Pro: 7.8 gm/dL / ALK PHOS: 39 U/L / ALT: 14 U/L / AST: 15 U/L / GGT: x           Urinalysis Basic - ( 04 Dec 2023 06:18 )    Color: x / Appearance: x / SG: x / pH: x  Gluc: 95 mg/dL / Ketone: x  / Bili: x / Urobili: x   Blood: x / Protein: x / Nitrite: x   Leuk Esterase: x / RBC: x / WBC x   Sq Epi: x / Non Sq Epi: x / Bacteria: x        Home Medications:  acetaminophen 325 mg oral tablet: 2 tab(s) orally every 6 hours, As needed, Temp greater or equal to 38C (100.4F) (28 Mar 2023 17:16)  amLODIPine 5 mg oral tablet: 1 tab(s) orally once a day (07 Nov 2023 11:10)  amLODIPine 5 mg oral tablet: 1 tab(s) orally once a day (28 Mar 2023 17:16)  finasteride 5 mg oral tablet: 1 tab(s) orally once a day (07 Nov 2023 11:10)  gabapentin 300 mg oral capsule: 1 cap(s) orally 2 times a day (07 Nov 2023 11:10)  gabapentin 300 mg oral capsule: 1 cap(s) orally 2 times a day (28 Mar 2023 17:16)  labetalol 100 mg oral tablet: 1 tab(s) orally 2 times a day (28 Mar 2023 17:16)  labetalol 100 mg oral tablet: 1 tab(s) orally 2 times a day (07 Nov 2023 11:10)  mirtazapine 15 mg oral tablet: 1/2 tab(s) orally once a day (28 Mar 2023 17:16)  PREDNISOLONE AC 1% OPHTH SUSP  5ML: SHAKE LIQUID AND INSTILL 1 DROP IN RIGHT EYE TWICE DAILY (28 Mar 2023 17:16)  tamsulosin 0.4 mg oral capsule: 1 cap(s) orally once a day (at bedtime) (28 Mar 2023 17:16)   89 y/o M w/ PMH fo dementia, HTN, has loop recorder, BPH w/ chronic obrien presents with syncope and found to have UTI    Patient seen and examined at bedside.   Consulted palliative care for consultation  UCx returned - antibiotics changed  BCx negative x 72 hrs      Physical exam:  General: patient in no significant distress, resting comfortably  Head:  Atraumatic, Normocephalic  Eyes: clear sclera  Neck: Supple, thyroid nontender,   Cardio: S1/S2 +ve  Resp: clear to ausculation bilaterally  GI: abdomen soft, nontender, non distended, no guarding, BS +ve x 4  Ext: no significant pedal edema  Neuro: disoriented, responsive to pain.  .  Skin: sacral decubitus ulcer +ve       Recent Vitals  Vital Signs Last 24 Hrs  T(C): 36.4 (05 Dec 2023 16:10), Max: 37.2 (05 Dec 2023 04:00)  T(F): 97.6 (05 Dec 2023 16:10), Max: 98.9 (05 Dec 2023 04:00)  HR: 92 (05 Dec 2023 16:10) (86 - 99)  BP: 149/67 (05 Dec 2023 16:10) (133/70 - 149/67)  BP(mean): --  RR: 18 (05 Dec 2023 16:10) (16 - 18)  SpO2: 97% (05 Dec 2023 16:10) (95% - 98%)    Parameters below as of 05 Dec 2023 16:10  Patient On (Oxygen Delivery Method): nasal cannula  O2 Flow (L/min): 1                          9.2    7.68  )-----------( 235      ( 04 Dec 2023 06:18 )             28.2     12-04    139  |  108  |  46<H>  ----------------------------<  95  3.5   |  20<L>  |  2.18<H>    Ca    8.8      04 Dec 2023 06:18  Phos  3.8     12-04  Mg     2.1     12-04    TPro  7.8  /  Alb  2.0<L>  /  TBili  0.4  /  DBili  x   /  AST  15  /  ALT  14  /  AlkPhos  39<L>  12-03      LIVER FUNCTIONS - ( 03 Dec 2023 07:30 )  Alb: 2.0 g/dL / Pro: 7.8 gm/dL / ALK PHOS: 39 U/L / ALT: 14 U/L / AST: 15 U/L / GGT: x           Urinalysis Basic - ( 04 Dec 2023 06:18 )    Color: x / Appearance: x / SG: x / pH: x  Gluc: 95 mg/dL / Ketone: x  / Bili: x / Urobili: x   Blood: x / Protein: x / Nitrite: x   Leuk Esterase: x / RBC: x / WBC x   Sq Epi: x / Non Sq Epi: x / Bacteria: x        Home Medications:  acetaminophen 325 mg oral tablet: 2 tab(s) orally every 6 hours, As needed, Temp greater or equal to 38C (100.4F) (28 Mar 2023 17:16)  amLODIPine 5 mg oral tablet: 1 tab(s) orally once a day (07 Nov 2023 11:10)  amLODIPine 5 mg oral tablet: 1 tab(s) orally once a day (28 Mar 2023 17:16)  finasteride 5 mg oral tablet: 1 tab(s) orally once a day (07 Nov 2023 11:10)  gabapentin 300 mg oral capsule: 1 cap(s) orally 2 times a day (07 Nov 2023 11:10)  gabapentin 300 mg oral capsule: 1 cap(s) orally 2 times a day (28 Mar 2023 17:16)  labetalol 100 mg oral tablet: 1 tab(s) orally 2 times a day (28 Mar 2023 17:16)  labetalol 100 mg oral tablet: 1 tab(s) orally 2 times a day (07 Nov 2023 11:10)  mirtazapine 15 mg oral tablet: 1/2 tab(s) orally once a day (28 Mar 2023 17:16)  PREDNISOLONE AC 1% OPHTH SUSP  5ML: SHAKE LIQUID AND INSTILL 1 DROP IN RIGHT EYE TWICE DAILY (28 Mar 2023 17:16)  tamsulosin 0.4 mg oral capsule: 1 cap(s) orally once a day (at bedtime) (28 Mar 2023 17:16)   Emery ALi

## 2024-01-19 NOTE — PHYSICAL THERAPY INITIAL EVALUATION ADULT - LEVEL OF CONSCIOUSNESS, REHAB EVAL
"Acupuncture Visit:     Subjective   Patient ID: Rito Palomo is a 63 y.o. male who presents for Nicotine Dependence    Initial Visit: 1/19/24    MC: Smoking cessation    Patient was referred by his PCP.  He verbally expressed interest in quitting smoking and states he wants to quit for economic reasons.  Has tried quitting in the past but \"not hard enough.\"  Currently smokes under a pack a day.  Works as a .        Session Information  Is this acupuncture treatment being billed to the patient's insurance company: No  Visit Type: New patient  Medical History Reviewed: I have reviewed pertinent medical history in EHR, and no contraindications are present to provide treatment         Review of Systems         Provider reviewed plan for the acupuncture session, precautions and contraindications. Patient/guardian/hospital staff has given consent to treat with full understanding of what to expect during the session. Before acupuncture began, provider explained to the patient to communicate at any time if the procedure was causing discomfort past their tolerance level. Patient agreed to advise acupuncturist. The acupuncturist counseled the patient on the risks of acupuncture treatment including pain, infection, bleeding, and no relief of pain. The patient was positioned comfortably. There was no evidence of infection at the site of needle insertions.    Objective   Physical Exam         Treatment Plan  Treatment Goals: Wellbeing improvement, Stress reduction, Relaxation    Acupuncture Treatment  Patient Position: Seated and reclining  Acupuncture Needling: Yes  Needle Guage: 40 guage /.16/ Red seirin, 32 guage /.25/ Purple seirin  Body Points: With retention  Body Points - Bilateral: 4 ritchie; ST36,40; LU9  Auricular Points: Yes  Auricular Points - Bilateral: NADA Protocol  Electroacupuncture Used: No  Needle Count In: 20  Needle Count Out: 20  Needle Retention Time (min): 30 minutes          "     Assessment/Plan        alert

## 2025-03-08 ENCOUNTER — OUTPATIENT (OUTPATIENT)
Dept: OUTPATIENT SERVICES | Facility: HOSPITAL | Age: 75
LOS: 1 days | End: 2025-03-08
Payer: MEDICARE

## 2025-03-08 DIAGNOSIS — R05.9 COUGH, UNSPECIFIED: ICD-10-CM

## 2025-03-08 PROBLEM — I10 ESSENTIAL (PRIMARY) HYPERTENSION: Chronic | Status: ACTIVE | Noted: 2023-09-09

## 2025-03-08 PROBLEM — Z92.89 PERSONAL HISTORY OF OTHER MEDICAL TREATMENT: Chronic | Status: ACTIVE | Noted: 2023-09-09

## 2025-03-08 PROBLEM — I50.32 CHRONIC DIASTOLIC (CONGESTIVE) HEART FAILURE: Chronic | Status: ACTIVE | Noted: 2023-09-09

## 2025-03-08 PROBLEM — N18.30 CHRONIC KIDNEY DISEASE, STAGE 3 UNSPECIFIED: Chronic | Status: ACTIVE | Noted: 2023-09-09

## 2025-03-08 PROBLEM — D50.9 IRON DEFICIENCY ANEMIA, UNSPECIFIED: Chronic | Status: ACTIVE | Noted: 2023-09-09

## 2025-03-08 PROCEDURE — 71046 X-RAY EXAM CHEST 2 VIEWS: CPT | Mod: 26

## 2025-03-08 PROCEDURE — 71046 X-RAY EXAM CHEST 2 VIEWS: CPT

## 2025-03-09 DIAGNOSIS — R05.9 COUGH, UNSPECIFIED: ICD-10-CM
